# Patient Record
Sex: FEMALE | Race: WHITE | NOT HISPANIC OR LATINO | Employment: OTHER | URBAN - METROPOLITAN AREA
[De-identification: names, ages, dates, MRNs, and addresses within clinical notes are randomized per-mention and may not be internally consistent; named-entity substitution may affect disease eponyms.]

---

## 2017-01-27 ENCOUNTER — COMMUNICATION - HEALTHEAST (OUTPATIENT)
Dept: INTERNAL MEDICINE | Facility: CLINIC | Age: 82
End: 2017-01-27

## 2017-03-14 ENCOUNTER — OFFICE VISIT - HEALTHEAST (OUTPATIENT)
Dept: INTERNAL MEDICINE | Facility: CLINIC | Age: 82
End: 2017-03-14

## 2017-03-14 DIAGNOSIS — R05.9 COUGH: ICD-10-CM

## 2017-03-14 DIAGNOSIS — I10 ESSENTIAL HYPERTENSION WITH GOAL BLOOD PRESSURE LESS THAN 140/90: ICD-10-CM

## 2017-03-14 DIAGNOSIS — K21.9 ESOPHAGEAL REFLUX: ICD-10-CM

## 2017-03-14 DIAGNOSIS — M54.2 CERVICALGIA: ICD-10-CM

## 2017-03-14 DIAGNOSIS — M19.90 OSTEOARTHRITIS: ICD-10-CM

## 2017-03-14 DIAGNOSIS — R04.0 EPISTAXIS, RECURRENT: ICD-10-CM

## 2017-03-14 DIAGNOSIS — M54.59 MECHANICAL LOW BACK PAIN: ICD-10-CM

## 2017-03-14 ASSESSMENT — MIFFLIN-ST. JEOR: SCORE: 887.16

## 2017-03-17 ENCOUNTER — COMMUNICATION - HEALTHEAST (OUTPATIENT)
Dept: INTERNAL MEDICINE | Facility: CLINIC | Age: 82
End: 2017-03-17

## 2017-04-11 ENCOUNTER — OFFICE VISIT - HEALTHEAST (OUTPATIENT)
Dept: INTERNAL MEDICINE | Facility: CLINIC | Age: 82
End: 2017-04-11

## 2017-04-11 DIAGNOSIS — R05.9 COUGH: ICD-10-CM

## 2017-04-11 DIAGNOSIS — K21.9 GASTROESOPHAGEAL REFLUX DISEASE WITHOUT ESOPHAGITIS: ICD-10-CM

## 2017-04-11 DIAGNOSIS — E87.6 HYPOKALEMIA: ICD-10-CM

## 2017-04-11 ASSESSMENT — MIFFLIN-ST. JEOR: SCORE: 896.23

## 2017-04-12 ENCOUNTER — COMMUNICATION - HEALTHEAST (OUTPATIENT)
Dept: INTERNAL MEDICINE | Facility: CLINIC | Age: 82
End: 2017-04-12

## 2017-04-28 ENCOUNTER — COMMUNICATION - HEALTHEAST (OUTPATIENT)
Dept: INTERNAL MEDICINE | Facility: CLINIC | Age: 82
End: 2017-04-28

## 2017-07-18 ENCOUNTER — COMMUNICATION - HEALTHEAST (OUTPATIENT)
Dept: INTERNAL MEDICINE | Facility: CLINIC | Age: 82
End: 2017-07-18

## 2017-07-28 ENCOUNTER — COMMUNICATION - HEALTHEAST (OUTPATIENT)
Dept: INTERNAL MEDICINE | Facility: CLINIC | Age: 82
End: 2017-07-28

## 2017-10-12 ENCOUNTER — OFFICE VISIT - HEALTHEAST (OUTPATIENT)
Dept: INTERNAL MEDICINE | Facility: CLINIC | Age: 82
End: 2017-10-12

## 2017-10-12 DIAGNOSIS — R05.9 COUGH: ICD-10-CM

## 2017-10-12 DIAGNOSIS — K21.9 GASTROESOPHAGEAL REFLUX DISEASE WITHOUT ESOPHAGITIS: ICD-10-CM

## 2017-10-12 DIAGNOSIS — D63.1 ANEMIA IN STAGE 1 CHRONIC KIDNEY DISEASE: ICD-10-CM

## 2017-10-12 DIAGNOSIS — E87.6 HYPOKALEMIA: ICD-10-CM

## 2017-10-12 DIAGNOSIS — S32.000A LUMBAR COMPRESSION FRACTURE (H): ICD-10-CM

## 2017-10-12 DIAGNOSIS — M19.90 OSTEOARTHRITIS: ICD-10-CM

## 2017-10-12 DIAGNOSIS — M81.0 OSTEOPOROSIS: ICD-10-CM

## 2017-10-12 DIAGNOSIS — R42 DIZZINESS AND GIDDINESS: ICD-10-CM

## 2017-10-12 DIAGNOSIS — M54.59 MECHANICAL LOW BACK PAIN: ICD-10-CM

## 2017-10-12 DIAGNOSIS — N18.1 ANEMIA IN STAGE 1 CHRONIC KIDNEY DISEASE: ICD-10-CM

## 2017-10-12 ASSESSMENT — MIFFLIN-ST. JEOR: SCORE: 887.16

## 2017-10-23 ENCOUNTER — COMMUNICATION - HEALTHEAST (OUTPATIENT)
Dept: INTERNAL MEDICINE | Facility: CLINIC | Age: 82
End: 2017-10-23

## 2017-10-23 DIAGNOSIS — E87.6 HYPOKALEMIA: ICD-10-CM

## 2017-11-10 ENCOUNTER — COMMUNICATION - HEALTHEAST (OUTPATIENT)
Dept: INTERNAL MEDICINE | Facility: CLINIC | Age: 82
End: 2017-11-10

## 2017-11-10 DIAGNOSIS — E87.6 HYPOKALEMIA: ICD-10-CM

## 2017-11-10 DIAGNOSIS — M81.0 OSTEOPOROSIS: ICD-10-CM

## 2018-01-05 ENCOUNTER — COMMUNICATION - HEALTHEAST (OUTPATIENT)
Dept: INTERNAL MEDICINE | Facility: CLINIC | Age: 83
End: 2018-01-05

## 2018-01-05 DIAGNOSIS — K21.9 ESOPHAGEAL REFLUX: ICD-10-CM

## 2018-02-28 ENCOUNTER — COMMUNICATION - HEALTHEAST (OUTPATIENT)
Dept: INTERNAL MEDICINE | Facility: CLINIC | Age: 83
End: 2018-02-28

## 2018-02-28 DIAGNOSIS — M81.0 OSTEOPOROSIS: ICD-10-CM

## 2018-03-01 ENCOUNTER — COMMUNICATION - HEALTHEAST (OUTPATIENT)
Dept: INTERNAL MEDICINE | Facility: CLINIC | Age: 83
End: 2018-03-01

## 2018-03-01 DIAGNOSIS — S32.000A LUMBAR COMPRESSION FRACTURE (H): ICD-10-CM

## 2018-03-01 DIAGNOSIS — M81.0 OSTEOPOROSIS: ICD-10-CM

## 2018-03-10 ENCOUNTER — COMMUNICATION - HEALTHEAST (OUTPATIENT)
Dept: SCHEDULING | Facility: CLINIC | Age: 83
End: 2018-03-10

## 2018-03-13 ENCOUNTER — OFFICE VISIT - HEALTHEAST (OUTPATIENT)
Dept: INTERNAL MEDICINE | Facility: CLINIC | Age: 83
End: 2018-03-13

## 2018-03-13 ENCOUNTER — RECORDS - HEALTHEAST (OUTPATIENT)
Dept: GENERAL RADIOLOGY | Facility: CLINIC | Age: 83
End: 2018-03-13

## 2018-03-13 DIAGNOSIS — S32.000A WEDGE COMPRESSION FRACTURE OF UNSPECIFIED LUMBAR VERTEBRA, INITIAL ENCOUNTER FOR CLOSED FRACTURE (H): ICD-10-CM

## 2018-03-13 DIAGNOSIS — S32.000A LUMBAR COMPRESSION FRACTURE (H): ICD-10-CM

## 2018-03-13 ASSESSMENT — MIFFLIN-ST. JEOR: SCORE: 882.63

## 2018-03-16 ENCOUNTER — OFFICE VISIT - HEALTHEAST (OUTPATIENT)
Dept: INTERNAL MEDICINE | Facility: CLINIC | Age: 83
End: 2018-03-16

## 2018-03-16 DIAGNOSIS — M81.0 OSTEOPOROSIS: ICD-10-CM

## 2018-03-16 DIAGNOSIS — S32.010A CLOSED COMPRESSION FRACTURE OF FIRST LUMBAR VERTEBRA, INITIAL ENCOUNTER: ICD-10-CM

## 2018-03-18 ENCOUNTER — COMMUNICATION - HEALTHEAST (OUTPATIENT)
Dept: SCHEDULING | Facility: CLINIC | Age: 83
End: 2018-03-18

## 2018-03-19 ENCOUNTER — HOSPITAL ENCOUNTER (OUTPATIENT)
Dept: MRI IMAGING | Facility: HOSPITAL | Age: 83
Discharge: HOME | End: 2018-03-19
Attending: INTERNAL MEDICINE

## 2018-03-19 ENCOUNTER — COMMUNICATION - HEALTHEAST (OUTPATIENT)
Dept: INTERNAL MEDICINE | Facility: CLINIC | Age: 83
End: 2018-03-19

## 2018-03-19 DIAGNOSIS — S32.000A LUMBAR COMPRESSION FRACTURE (H): ICD-10-CM

## 2018-03-19 DIAGNOSIS — M48.56XA COLLAPSED VERTEBRA, NOT ELSEWHERE CLASSIFIED, LUMBAR REGION, INITIAL ENCOUNTER FOR FRACTURE (H): ICD-10-CM

## 2018-03-20 ENCOUNTER — HOSPITAL ENCOUNTER (OUTPATIENT)
Dept: INTERVENTIONAL RADIOLOGY/VASCULAR | Facility: CLINIC | Age: 83
Discharge: HOME | End: 2018-03-20
Attending: INTERNAL MEDICINE | Admitting: RADIOLOGY

## 2018-03-20 DIAGNOSIS — M48.56XA COLLAPSED VERTEBRA, NOT ELSEWHERE CLASSIFIED, LUMBAR REGION, INITIAL ENCOUNTER FOR FRACTURE (H): ICD-10-CM

## 2018-03-20 DIAGNOSIS — S32.000A LUMBAR COMPRESSION FRACTURE (H): ICD-10-CM

## 2018-03-20 LAB
CREAT SERPL-MCNC: 0.73 MG/DL (ref 0.6–1.1)
GFR SERPL CREATININE-BSD FRML MDRD: >60 ML/MIN/1.73M2
HGB BLD-MCNC: 13 G/DL (ref 12–16)
INR PPP: 0.99 (ref 0.9–1.1)
PLATELET # BLD AUTO: 222 THOU/UL (ref 140–440)

## 2018-03-20 ASSESSMENT — MIFFLIN-ST. JEOR: SCORE: 879.9

## 2018-04-19 ENCOUNTER — OFFICE VISIT - HEALTHEAST (OUTPATIENT)
Dept: INTERNAL MEDICINE | Facility: CLINIC | Age: 83
End: 2018-04-19

## 2018-04-19 DIAGNOSIS — K21.9 GASTROESOPHAGEAL REFLUX DISEASE WITHOUT ESOPHAGITIS: ICD-10-CM

## 2018-04-19 DIAGNOSIS — D63.1 ANEMIA IN STAGE 1 CHRONIC KIDNEY DISEASE: ICD-10-CM

## 2018-04-19 DIAGNOSIS — S32.010A CLOSED COMPRESSION FRACTURE OF FIRST LUMBAR VERTEBRA, INITIAL ENCOUNTER: ICD-10-CM

## 2018-04-19 DIAGNOSIS — N18.1 ANEMIA IN STAGE 1 CHRONIC KIDNEY DISEASE: ICD-10-CM

## 2018-04-19 ASSESSMENT — MIFFLIN-ST. JEOR: SCORE: 882.63

## 2018-06-29 ENCOUNTER — COMMUNICATION - HEALTHEAST (OUTPATIENT)
Dept: INTERNAL MEDICINE | Facility: CLINIC | Age: 83
End: 2018-06-29

## 2018-08-05 ENCOUNTER — COMMUNICATION - HEALTHEAST (OUTPATIENT)
Dept: INTERNAL MEDICINE | Facility: CLINIC | Age: 83
End: 2018-08-05

## 2018-08-05 DIAGNOSIS — M81.0 OSTEOPOROSIS: ICD-10-CM

## 2018-08-23 ENCOUNTER — RADIANT APPOINTMENT (OUTPATIENT)
Dept: GENERAL RADIOLOGY | Facility: CLINIC | Age: 83
End: 2018-08-23
Attending: PHYSICIAN ASSISTANT
Payer: COMMERCIAL

## 2018-08-23 ENCOUNTER — OFFICE VISIT (OUTPATIENT)
Dept: URGENT CARE | Facility: URGENT CARE | Age: 83
End: 2018-08-23
Payer: COMMERCIAL

## 2018-08-23 VITALS
SYSTOLIC BLOOD PRESSURE: 160 MMHG | HEART RATE: 97 BPM | OXYGEN SATURATION: 95 % | DIASTOLIC BLOOD PRESSURE: 70 MMHG | WEIGHT: 118 LBS

## 2018-08-23 DIAGNOSIS — M89.8X2 PAIN OF RIGHT HUMERUS: ICD-10-CM

## 2018-08-23 DIAGNOSIS — S46.211A RUPTURE OF RIGHT BICEPS TENDON, INITIAL ENCOUNTER: Primary | ICD-10-CM

## 2018-08-23 DIAGNOSIS — S80.12XA LEG HEMATOMA, LEFT, INITIAL ENCOUNTER: ICD-10-CM

## 2018-08-23 PROCEDURE — 99203 OFFICE O/P NEW LOW 30 MIN: CPT | Performed by: PHYSICIAN ASSISTANT

## 2018-08-23 PROCEDURE — 73060 X-RAY EXAM OF HUMERUS: CPT | Mod: RT

## 2018-08-23 RX ORDER — FERROUS GLUCONATE 324(38)MG
324 TABLET ORAL
COMMUNITY
End: 2022-02-16

## 2018-08-23 RX ORDER — OMEGA-3-ACID ETHYL ESTERS 900 MG/1
CAPSULE, LIQUID FILLED ORAL
COMMUNITY
End: 2022-02-16

## 2018-08-23 RX ORDER — ACETAMINOPHEN 325 MG/1
650 TABLET ORAL
COMMUNITY
End: 2022-02-28

## 2018-08-23 RX ORDER — POTASSIUM CHLORIDE 750 MG/1
TABLET, EXTENDED RELEASE ORAL
COMMUNITY
Start: 2017-10-23 | End: 2020-02-28

## 2018-08-23 RX ORDER — ALENDRONATE SODIUM 70 MG/1
TABLET ORAL
COMMUNITY
Start: 2018-08-05 | End: 2020-06-18

## 2018-08-23 RX ORDER — OXYBUTYNIN CHLORIDE 10 MG/1
TABLET, EXTENDED RELEASE ORAL
COMMUNITY
Start: 2017-07-18 | End: 2021-06-28

## 2018-08-23 RX ORDER — DOCUSATE SODIUM 100 MG/1
100 CAPSULE, LIQUID FILLED ORAL
COMMUNITY
End: 2022-02-28

## 2018-08-23 NOTE — PATIENT INSTRUCTIONS
Hematoma  A hematoma is a collection of blood trapped outside of a blood vessel. It is what we think of as a bruise or a contusion. It is usually seen under the skin as a black and blue spot on your arm or leg, or a bump on your head after an injury. It can be almost anywhere on or in your body. It can also occur in an internal organ where it can be more serious.  A hematoma is caused by an injury with damage to small blood vessels. This causes blood to leak into the tissues. Blood forms a pocket under the skin that swells and looks like a purplish patch.  Gradually the blood in the hematoma is absorbed back into the body. The swelling and pain of the hematoma will go away. This takes from 1 to 4 weeks, depending on the size of the hematoma. The skin over the hematoma may turn bluish then brown and yellow as the blood is dissolved and absorbed. Usually, this only takes a couple of weeks but can last months.  Home care    Limit motion of the joints near the hematoma. If the hematoma is large and painful, avoid sports and other vigorous physical activity until the swelling and pain goes away.    Apply an ice pack (ice cubes in a plastic bag, wrapped in a thin towel or a frozen bag of peas) over the injured area for 20 minutes every 1 to 2 hours the first day. Continue with ice packs 3 to 4 times a day for the next 2 days. Continue the use of ice packs for relief of pain and swelling as needed.    If you need anything for pain, you can take acetaminophen, unless you were given a different pain medicine to use. Talk with your doctor before using this medicine if you have chronic liver or kidney disease. Also talk with your doctor if you have had a stomach ulcer or digestive tract bleeding, or are taking blood-thinner medicines.  Follow-up care  Follow up with your healthcare provider, or as advised. If X-rays or a CT scan were done, you will be notified if there is a change in the reading, especially if it affects  treatment.  When to seek medical advice  Call your healthcare provider right away f any of the following occur:    Redness around the hematoma    Increase in pain or warmth in the hematoma    Increase in size of the hematoma    Fever of 100.4 F (38 C) or higher, or as directed by your healthcare provider    If the hematoma is on the arm or leg, watch for:  ? Increased swelling or pain in the extremity  ? Numbness or tingling or blue color of the hand or foot  Date Last Reviewed: 11/5/2015 2000-2017 [employer]. [address] [place] [address]. All rights reserved. This information is not intended as a substitute for professional medical care. Always follow your healthcare professional's instructions.    8/23/18 Urgent Care Plan:     As we discussed today, the swelling and bruising you developed immediately after your fall may be a hematoma without other associated injury. However, it also may be a rupture of your bicep tendon.     Please follow-up with your primary care doctor or an orthopedic doctor (I provided a referral option for you).

## 2018-08-23 NOTE — MR AVS SNAPSHOT
After Visit Summary   8/23/2018    Lynn Soriano    MRN: [id number]           Patient Information     Date Of Birth          3/3/1929        Visit Information        Provider Department      8/23/2018 3:10 PM Zackery Edwards PA-C [facility]        Today's Diagnoses     Pain of right humerus    -  1      Care Instructions      Hematoma  A hematoma is a collection of blood trapped outside of a blood vessel. It is what we think of as a bruise or a contusion. It is usually seen under the skin as a black and blue spot on your arm or leg, or a bump on your head after an injury. It can be almost anywhere on or in your body. It can also occur in an internal organ where it can be more serious.  A hematoma is caused by an injury with damage to small blood vessels. This causes blood to leak into the tissues. Blood forms a pocket under the skin that swells and looks like a purplish patch.  Gradually the blood in the hematoma is absorbed back into the body. The swelling and pain of the hematoma will go away. This takes from 1 to 4 weeks, depending on the size of the hematoma. The skin over the hematoma may turn bluish then brown and yellow as the blood is dissolved and absorbed. Usually, this only takes a couple of weeks but can last months.  Home care    Limit motion of the joints near the hematoma. If the hematoma is large and painful, avoid sports and other vigorous physical activity until the swelling and pain goes away.    Apply an ice pack (ice cubes in a plastic bag, wrapped in a thin towel or a frozen bag of peas) over the injured area for 20 minutes every 1 to 2 hours the first day. Continue with ice packs 3 to 4 times a day for the next 2 days. Continue the use of ice packs for relief of pain and swelling as needed.    If you need anything for pain, you can take acetaminophen, unless you were given a different pain medicine to use. Talk with your doctor before using this  medicine if you have chronic liver or kidney disease. Also talk with your doctor if you have had a stomach ulcer or digestive tract bleeding, or are taking blood-thinner medicines.  Follow-up care  Follow up with your healthcare provider, or as advised. If X-rays or a CT scan were done, you will be notified if there is a change in the reading, especially if it affects treatment.  When to seek medical advice  Call your healthcare provider right away f any of the following occur:    Redness around the hematoma    Increase in pain or warmth in the hematoma    Increase in size of the hematoma    Fever of 100.4 F (38 C) or higher, or as directed by your healthcare provider    If the hematoma is on the arm or leg, watch for:  ? Increased swelling or pain in the extremity  ? Numbness or tingling or blue color of the hand or foot  Date Last Reviewed: 11/5/2015 2000-2017 [employer]. [address] [place] [address]. All rights reserved. This information is not intended as a substitute for professional medical care. Always follow your healthcare professional's instructions.    8/23/18 Urgent Care Plan:     As we discussed today, the swelling and bruising you developed immediately after your fall may be a hematoma without other associated injury. However, it also may be a rupture of your bicep tendon.     Please follow-up with your primary care doctor or an orthopedic doctor (I provided a referral option for you).             Follow-ups after your visit        Additional Services     ORTHO  REFERRAL       [facility] is referring you to the Orthopedic  Services at [facility].       The  Representative will assist you in the coordination of your Orthopedic and Musculoskeletal Care as prescribed by your physician.    The  Representative will call you within 1 business day to help schedule your appointment, or you may contact the  "[facility] Representative at:    All areas ~ [phone]     Type of Referral : Surgical / Specialist       Timeframe requested: 3 - 5 days    Coverage of these services is subject to the terms and limitations of your health insurance plan.  Please call member services at your health plan with any benefit or coverage questions.      If X-rays, CT or MRI's have been performed, please contact the facility where they were done to arrange for , prior to your scheduled appointment.  Please bring this referral request to your appointment and present it to your specialist.                  Who to contact     If you have questions or need follow up information about today's clinic visit or your schedule please contact [facility] directly at [phone].  Normal or non-critical lab and imaging results will be communicated to you by [employer], letter or phone within 4 business days after the clinic has received the results. If you do not hear from us within 7 days, please contact the clinic through [employer] or phone. If you have a critical or abnormal lab result, we will notify you by phone as soon as possible.  Submit refill requests through [employer] or call your pharmacy and they will forward the refill request to us. Please allow 3 business days for your refill to be completed.          Additional Information About Your Visit        [employer] Information     [employer] lets you send messages to your doctor, view your test results, renew your prescriptions, schedule appointments and more. To sign up, go to [email or web address] . Click on \"Log in\" on the left side of the screen, which will take you to the Welcome page. Then click on \"Sign up Now\" on the right side of the page.     You will be asked to enter the access code listed below, as well as some personal information. Please follow the directions to create your username and password.     Your access code is: [email or web address]  Expires: 11/21/2018  4:32 " PM     Your access code will  in 90 days. If you need help or a new code, please call your [facility] clinic or [phone].        Care EveryWhere ID     This is your Care EveryWhere ID. This could be used by other organizations to access your [facility] medical records  [id number]        Your Vitals Were     Pulse Pulse Oximetry                97 95%           Blood Pressure from Last 3 Encounters:   18 160/70    Weight from Last 3 Encounters:   18 118 lb (53.5 kg)              We Performed the Following     ORTHO  REFERRAL        Primary Care Provider Office Phone # Fax #    Hakan Monson [phone]       [facility] [address] [address]        Equal Access to Services     ROCCO HALL : Hadii angela ku hadasho Soomaali, waaxda luqadaha, qaybta kaalmada adeegyada, waxay idiin haycarolynn sonja winkler . So [facility] [phone].    ATENCIÓN: Si habla español, tiene a cox disposición servicios gratuitos de asistencia lingüística. Llame al [phone].    We comply with applicable federal civil rights laws and [place] laws. We do not discriminate on the basis of race, color, national origin, age, disability, sex, sexual orientation, or gender identity.            Thank you!     Thank you for choosing [facility]  for your care. Our goal is always to provide you with excellent care. Hearing back from our patients is one way we can continue to improve our services. Please take a few minutes to complete the written survey that you may receive in the mail after your visit with us. Thank you!             Your Updated Medication List - Protect others around you: Learn how to safely use, store and throw away your medicines at www.disposemymeds.org.          This list is accurate as of 18  4:39 PM.  Always use your most recent med list.                   Brand Name Dispense Instructions for use Diagnosis    acetaminophen 325  MG tablet    TYLENOL     Take 650 mg by mouth        alendronate 70 MG tablet    FOSAMAX     TAKE 1 TABLET (70 MG TOTAL) BY MOUTH EVERY 7 DAYS.        CALCIUM 500 + D 500-125 MG-UNIT Tabs   Generic drug:  Calcium Carbonate-Vitamin D           docusate sodium 100 MG capsule    COLACE     Take 100 mg by mouth        ferrous gluconate 324 (38 Fe) MG tablet    FERGON     Take 324 mg by mouth        fish oil omega-3 fatty acid 320MG/ML oral suspension           KLOR-CON 10 MEQ tablet   Generic drug:  potassium chloride      TAKE 1 TABLET (10 MEQ TOTAL) BY MOUTH DAILY.        MULTIVITAMIN ADULT PO      Take 1 tablet by mouth        omeprazole 20 MG CR capsule    priLOSEC     TAKE 1 CAPSULE (20 MG TOTAL) BY MOUTH DAILY.        oxybutynin 10 MG 24 hr tablet    DITROPAN-XL     TAKE 1 TABLET (10 MG TOTAL) BY MOUTH DAILY.        Sodium Chloride 0.65 % Soln      Spray 1 spray in nostril

## 2018-08-23 NOTE — PROGRESS NOTES
SUBJECTIVE:  Lynn Soriano  is a 89 year oldyear old left hand dominant woman who presents to  today, accompanied by her daughter (Kelley) for evaluation of right upper arm pain and left leg pain (medial to knee) after fall she sustained at home just prior to her  visit today.     Patient was sitting on couch at home when doorbell rang. She got up quickly and feels she may have tripped on rug--falling forward onto carpeted floor in process. Patient ambulates with walker and/or cane assist.       Denies any LOC. Denies any head or neck sxs. Denies any pain elsewhere.     Mechanism of injury: positive for immediate right arm pain and bruising.  No deformity was noted by the patient. She also noted left leg bruising but has minimal pain only left leg.   Symptoms have been unchanged since that time.   Prior history of related problems: no prior problems with this area in the past.  Symptoms exacerbated by: Admits to increased tenderness with ROM RUE  Symptoms relieved by: No pain at rest in neutral position     Specifically denies any cold, blue, icy feeling fingers or toes.  Denies any numbness or tingling of affected upper extremity or affected lower extremity.     PMHX: Osteoporosis and overactive bladder     Current Outpatient Prescriptions   Medication     alendronate (FOSAMAX) 70 MG tablet     Calcium Carbonate-Vitamin D (CALCIUM 500 + D) 500-125 MG-UNIT TABS     docusate sodium (COLACE) 100 MG capsule     ferrous gluconate (FERGON) 324 (38 Fe) MG tablet     Multiple Vitamins-Minerals (MULTIVITAMIN ADULT PO)     Omega-3-acid Ethyl Esters (FISH OIL OMEGA-3 FATTY ACID) 320MG/ML oral suspension     omeprazole (PRILOSEC) 20 MG CR capsule     oxybutynin (DITROPAN-XL) 10 MG 24 hr tablet     potassium chloride (KLOR-CON) 10 MEQ tablet     Saline (SODIUM CHLORIDE) 0.65 % SOLN     acetaminophen (TYLENOL) 325 MG tablet     No current facility-administered medications for this visit.      Allergies   Allergen  "Reactions     Aspirin      Other reaction(s): *Unknown     Penicillins        OBJECTIVE:  /70 (BP Location: Right arm, Patient Position: Chair, Cuff Size: Adult Regular)  Pulse 97  Wt 118 lb (53.5 kg)  SpO2 95%     GENERAL:  Very pleasant, comfortable and generally well appearing.     RIGHT UPPER EXTREMITY: Positive for bruising and swelling right bicep/mid-humerus. This does look like a \"wad\"/bicep rupture. Pain is focal in this area. Non-tender on palpation of humeral head, distal humerus, clavicle, elbow, forearm, wrist, hand or fingers.       LLE: Valgus deformity noted. Positive hematoma medial to patella. Minimal tenderness. Non-tender on palpation of actual patella, tibia, fibula or femur. Patient has FROM knee. No limp with ambulation.     SKIN: Positive right humerus bruising and LLE bruising as per above. No lacerations or abrasions    NEURO: Sensation intact to light touch along entire UE and into all fingertips today. NEURO: Alert and oriented.  Normal speech and mentation.  CN II/XII grossly intact.  Gait within normal limits.        VASCULAR: No compromise to distal circulation. Brachial and radial pulses are 2+ and equal to that of unaffected extremity. Good/brisk capillary refill to all digits confirmed today.      I reviewed my impression (No acute fracture. No acute findings), along with actual x-ray images, with patient and daughter during her office visit today.  Radiologist over-read pending. Patient will need to be called if there are any acute findings on radiologist over-read.         ASSESSMENT/PLAN:    8/23/18 Urgent Care Plan:     As we discussed today, the swelling and bruising you developed immediately after your fall may be a hematoma without other associated injury. However, it also may be a rupture of your bicep tendon.     Please follow-up with your primary care doctor or an orthopedic doctor (I provided a referral option for you).       (S46.211A) Rupture of right biceps " tendon, initial encounter  (primary encounter diagnosis)  Plan: ORTHO  REFERRAL    RICE. Tylenol prn. I intentionally did not place in sling due to concern for increased fall risk. I did wrap with ACE to provide some compression to area with instructions to remove frequently.     (M79.621) Pain of right humerus  Plan: XR Humerus Right G/E 2 Views, ORTHO          REFERRAL  As per above     (S80.12XA) Leg hematoma, left, initial encounter  Plan: ICE. Follow-up with PCP if sxs change, worsen or fail to fully resolve with above tx.

## 2018-08-30 ENCOUNTER — OFFICE VISIT - HEALTHEAST (OUTPATIENT)
Dept: INTERNAL MEDICINE | Facility: CLINIC | Age: 83
End: 2018-08-30

## 2018-08-30 DIAGNOSIS — I10 ESSENTIAL HYPERTENSION: ICD-10-CM

## 2018-08-30 DIAGNOSIS — M75.101 ROTATOR CUFF SYNDROME, RIGHT: ICD-10-CM

## 2018-08-30 ASSESSMENT — MIFFLIN-ST. JEOR: SCORE: 864.48

## 2018-09-13 ENCOUNTER — RECORDS - HEALTHEAST (OUTPATIENT)
Dept: ADMINISTRATIVE | Facility: OTHER | Age: 83
End: 2018-09-13

## 2018-10-07 ENCOUNTER — COMMUNICATION - HEALTHEAST (OUTPATIENT)
Dept: INTERNAL MEDICINE | Facility: CLINIC | Age: 83
End: 2018-10-07

## 2018-10-23 ENCOUNTER — COMMUNICATION - HEALTHEAST (OUTPATIENT)
Dept: INTERNAL MEDICINE | Facility: CLINIC | Age: 83
End: 2018-10-23

## 2018-10-23 ENCOUNTER — OFFICE VISIT - HEALTHEAST (OUTPATIENT)
Dept: INTERNAL MEDICINE | Facility: CLINIC | Age: 83
End: 2018-10-23

## 2018-10-23 DIAGNOSIS — E87.6 HYPOKALEMIA: ICD-10-CM

## 2018-10-23 DIAGNOSIS — I73.00 RAYNAUD'S DISEASE WITHOUT GANGRENE: ICD-10-CM

## 2018-10-23 DIAGNOSIS — D63.1 ANEMIA IN STAGE 1 CHRONIC KIDNEY DISEASE: ICD-10-CM

## 2018-10-23 DIAGNOSIS — I10 ESSENTIAL HYPERTENSION: ICD-10-CM

## 2018-10-23 DIAGNOSIS — M75.101 ROTATOR CUFF SYNDROME, RIGHT: ICD-10-CM

## 2018-10-23 DIAGNOSIS — M81.0 OSTEOPOROSIS: ICD-10-CM

## 2018-10-23 DIAGNOSIS — N18.1 ANEMIA IN STAGE 1 CHRONIC KIDNEY DISEASE: ICD-10-CM

## 2018-10-23 DIAGNOSIS — M54.59 MECHANICAL LOW BACK PAIN: ICD-10-CM

## 2018-10-23 DIAGNOSIS — K21.9 GASTROESOPHAGEAL REFLUX DISEASE WITHOUT ESOPHAGITIS: ICD-10-CM

## 2018-10-23 DIAGNOSIS — S32.010A CLOSED COMPRESSION FRACTURE OF FIRST LUMBAR VERTEBRA, INITIAL ENCOUNTER: ICD-10-CM

## 2018-10-23 LAB
ALBUMIN SERPL-MCNC: 3.8 G/DL (ref 3.5–5)
ALP SERPL-CCNC: 47 U/L (ref 45–120)
ALT SERPL W P-5'-P-CCNC: 27 U/L (ref 0–45)
ANION GAP SERPL CALCULATED.3IONS-SCNC: 11 MMOL/L (ref 5–18)
AST SERPL W P-5'-P-CCNC: 28 U/L (ref 0–40)
BILIRUB SERPL-MCNC: 0.5 MG/DL (ref 0–1)
BUN SERPL-MCNC: 22 MG/DL (ref 8–28)
CALCIUM SERPL-MCNC: 9.5 MG/DL (ref 8.5–10.5)
CHLORIDE BLD-SCNC: 106 MMOL/L (ref 98–107)
CHOLEST SERPL-MCNC: 210 MG/DL
CO2 SERPL-SCNC: 26 MMOL/L (ref 22–31)
CREAT SERPL-MCNC: 0.76 MG/DL (ref 0.6–1.1)
ERYTHROCYTE [DISTWIDTH] IN BLOOD BY AUTOMATED COUNT: 11.9 % (ref 11–14.5)
FASTING STATUS PATIENT QL REPORTED: YES
GFR SERPL CREATININE-BSD FRML MDRD: >60 ML/MIN/1.73M2
GLUCOSE BLD-MCNC: 90 MG/DL (ref 70–125)
HCT VFR BLD AUTO: 40.8 % (ref 35–47)
HDLC SERPL-MCNC: 80 MG/DL
HGB BLD-MCNC: 13.4 G/DL (ref 12–16)
LDLC SERPL CALC-MCNC: 113 MG/DL
MCH RBC QN AUTO: 32.5 PG (ref 27–34)
MCHC RBC AUTO-ENTMCNC: 32.8 G/DL (ref 32–36)
MCV RBC AUTO: 99 FL (ref 80–100)
PLATELET # BLD AUTO: 211 THOU/UL (ref 140–440)
PMV BLD AUTO: 7.8 FL (ref 7–10)
POTASSIUM BLD-SCNC: 3.9 MMOL/L (ref 3.5–5)
PROT SERPL-MCNC: 6.8 G/DL (ref 6–8)
RBC # BLD AUTO: 4.11 MILL/UL (ref 3.8–5.4)
SODIUM SERPL-SCNC: 143 MMOL/L (ref 136–145)
TRIGL SERPL-MCNC: 85 MG/DL
WBC: 5.7 THOU/UL (ref 4–11)

## 2018-10-23 ASSESSMENT — MIFFLIN-ST. JEOR: SCORE: 869.02

## 2019-04-16 ENCOUNTER — RECORDS - HEALTHEAST (OUTPATIENT)
Dept: ADMINISTRATIVE | Facility: OTHER | Age: 84
End: 2019-04-16

## 2019-04-19 ENCOUNTER — COMMUNICATION - HEALTHEAST (OUTPATIENT)
Dept: INTERNAL MEDICINE | Facility: CLINIC | Age: 84
End: 2019-04-19

## 2019-04-23 ENCOUNTER — OFFICE VISIT - HEALTHEAST (OUTPATIENT)
Dept: INTERNAL MEDICINE | Facility: CLINIC | Age: 84
End: 2019-04-23

## 2019-04-23 DIAGNOSIS — M81.0 OSTEOPOROSIS: ICD-10-CM

## 2019-04-23 DIAGNOSIS — N18.1 ANEMIA IN STAGE 1 CHRONIC KIDNEY DISEASE: ICD-10-CM

## 2019-04-23 DIAGNOSIS — I10 ESSENTIAL HYPERTENSION: ICD-10-CM

## 2019-04-23 DIAGNOSIS — E87.6 HYPOKALEMIA: ICD-10-CM

## 2019-04-23 DIAGNOSIS — M54.59 MECHANICAL LOW BACK PAIN: ICD-10-CM

## 2019-04-23 DIAGNOSIS — I73.00 RAYNAUD'S DISEASE WITHOUT GANGRENE: ICD-10-CM

## 2019-04-23 DIAGNOSIS — K21.9 GASTROESOPHAGEAL REFLUX DISEASE WITHOUT ESOPHAGITIS: ICD-10-CM

## 2019-04-23 DIAGNOSIS — D63.1 ANEMIA IN STAGE 1 CHRONIC KIDNEY DISEASE: ICD-10-CM

## 2019-04-23 DIAGNOSIS — S32.010A CLOSED COMPRESSION FRACTURE OF FIRST LUMBAR VERTEBRA, INITIAL ENCOUNTER: ICD-10-CM

## 2019-09-06 ENCOUNTER — COMMUNICATION - HEALTHEAST (OUTPATIENT)
Dept: INTERNAL MEDICINE | Facility: CLINIC | Age: 84
End: 2019-09-06

## 2019-10-24 ENCOUNTER — OFFICE VISIT - HEALTHEAST (OUTPATIENT)
Dept: INTERNAL MEDICINE | Facility: CLINIC | Age: 84
End: 2019-10-24

## 2019-10-24 ENCOUNTER — COMMUNICATION - HEALTHEAST (OUTPATIENT)
Dept: INTERNAL MEDICINE | Facility: CLINIC | Age: 84
End: 2019-10-24

## 2019-10-24 DIAGNOSIS — Z00.00 ROUTINE GENERAL MEDICAL EXAMINATION AT A HEALTH CARE FACILITY: ICD-10-CM

## 2019-10-24 DIAGNOSIS — E87.6 HYPOKALEMIA: ICD-10-CM

## 2019-10-24 DIAGNOSIS — M80.00XA AGE-RELATED OSTEOPOROSIS WITH CURRENT PATHOLOGICAL FRACTURE, INITIAL ENCOUNTER: ICD-10-CM

## 2019-10-24 DIAGNOSIS — K21.9 GASTROESOPHAGEAL REFLUX DISEASE WITHOUT ESOPHAGITIS: ICD-10-CM

## 2019-10-24 DIAGNOSIS — M48.56XA COLLAPSED VERTEBRA, NOT ELSEWHERE CLASSIFIED, LUMBAR REGION, INITIAL ENCOUNTER FOR FRACTURE (H): ICD-10-CM

## 2019-10-24 DIAGNOSIS — D63.1 ANEMIA IN STAGE 1 CHRONIC KIDNEY DISEASE: ICD-10-CM

## 2019-10-24 DIAGNOSIS — N18.1 ANEMIA IN STAGE 1 CHRONIC KIDNEY DISEASE: ICD-10-CM

## 2019-10-24 DIAGNOSIS — M75.101 ROTATOR CUFF SYNDROME, RIGHT: ICD-10-CM

## 2019-10-24 DIAGNOSIS — S32.010A CLOSED COMPRESSION FRACTURE OF FIRST LUMBAR VERTEBRA, INITIAL ENCOUNTER: ICD-10-CM

## 2019-10-24 DIAGNOSIS — I10 ESSENTIAL HYPERTENSION: ICD-10-CM

## 2019-10-24 DIAGNOSIS — Z23 FLU VACCINE NEED: ICD-10-CM

## 2019-10-24 DIAGNOSIS — M54.59 MECHANICAL LOW BACK PAIN: ICD-10-CM

## 2019-10-24 LAB
ALBUMIN SERPL-MCNC: 4 G/DL (ref 3.5–5)
ALP SERPL-CCNC: 40 U/L (ref 45–120)
ALT SERPL W P-5'-P-CCNC: 19 U/L (ref 0–45)
ANION GAP SERPL CALCULATED.3IONS-SCNC: 9 MMOL/L (ref 5–18)
AST SERPL W P-5'-P-CCNC: 23 U/L (ref 0–40)
BILIRUB SERPL-MCNC: 0.6 MG/DL (ref 0–1)
BUN SERPL-MCNC: 13 MG/DL (ref 8–28)
CALCIUM SERPL-MCNC: 9.7 MG/DL (ref 8.5–10.5)
CHLORIDE BLD-SCNC: 105 MMOL/L (ref 98–107)
CO2 SERPL-SCNC: 26 MMOL/L (ref 22–31)
CREAT SERPL-MCNC: 0.77 MG/DL (ref 0.6–1.1)
ERYTHROCYTE [DISTWIDTH] IN BLOOD BY AUTOMATED COUNT: 11.9 % (ref 11–14.5)
GFR SERPL CREATININE-BSD FRML MDRD: >60 ML/MIN/1.73M2
GLUCOSE BLD-MCNC: 92 MG/DL (ref 70–125)
HCT VFR BLD AUTO: 42.5 % (ref 35–47)
HGB BLD-MCNC: 14.1 G/DL (ref 12–16)
MCH RBC QN AUTO: 32.2 PG (ref 27–34)
MCHC RBC AUTO-ENTMCNC: 33.1 G/DL (ref 32–36)
MCV RBC AUTO: 97 FL (ref 80–100)
PLATELET # BLD AUTO: 186 THOU/UL (ref 140–440)
PMV BLD AUTO: 7.6 FL (ref 7–10)
POTASSIUM BLD-SCNC: 4.4 MMOL/L (ref 3.5–5)
PROT SERPL-MCNC: 7.3 G/DL (ref 6–8)
RBC # BLD AUTO: 4.36 MILL/UL (ref 3.8–5.4)
SODIUM SERPL-SCNC: 140 MMOL/L (ref 136–145)
WBC: 5.9 THOU/UL (ref 4–11)

## 2019-10-24 ASSESSMENT — MIFFLIN-ST. JEOR: SCORE: 853.7

## 2019-11-18 ENCOUNTER — RECORDS - HEALTHEAST (OUTPATIENT)
Dept: ADMINISTRATIVE | Facility: OTHER | Age: 84
End: 2019-11-18

## 2019-12-06 ENCOUNTER — DOCUMENTATION ONLY (OUTPATIENT)
Dept: OTHER | Facility: CLINIC | Age: 84
End: 2019-12-06

## 2019-12-06 ENCOUNTER — AMBULATORY - HEALTHEAST (OUTPATIENT)
Dept: OTHER | Facility: CLINIC | Age: 84
End: 2019-12-06

## 2019-12-17 ENCOUNTER — RECORDS - HEALTHEAST (OUTPATIENT)
Dept: ADMINISTRATIVE | Facility: OTHER | Age: 84
End: 2019-12-17

## 2019-12-23 ENCOUNTER — PRE VISIT (OUTPATIENT)
Dept: PEDIATRICS | Facility: CLINIC | Age: 84
End: 2019-12-23

## 2019-12-23 NOTE — TELEPHONE ENCOUNTER
"Pre-Visit Planning     Future Appointments   Date Time Provider Department Center   12/24/2019  9:00 AM Priscilla Smith MD [facility]     Arrival Time for this Appointment:    Appointment Notes for this encounter:   Data Unavailable    Questionnaires Reviewed/Assigned  No additional questionnaires are needed       Patient preferred phone number: [phone]    Spoke to patient via phone. Patient does not have additional questions or concerns.        Visit is not preventive.    Health Maintenance Due   Topic Date Due     ANNUAL REVIEW OF HM ORDERS  03/03/1929     DTAP/TDAP/TD IMMUNIZATION (1 - Tdap) 03/03/1940     ZOSTER IMMUNIZATION (1 of 2) 03/03/1979     MEDICARE ANNUAL WELLNESS VISIT  03/03/1994     FALL RISK ASSESSMENT  03/03/1994     PNEUMOCOCCAL IMMUNIZATION 65+ LOW/MEDIUM RISK (1 of 2 - PCV13) 03/03/1994     PHQ-2  01/01/2019     Patient is due for:  preventive care visit  Patient declined appointment.    [employer]  Patient is not active on [employer]. Encouraged [employer] activation.    Questionnaire Review   Advised patient to arrive early in order to complete questionnaires.    Call Summary  \"Thank you for your time today.  If anything comes up before your appointment, please feel free to contact us at [phone].\"  "

## 2019-12-24 ENCOUNTER — OFFICE VISIT (OUTPATIENT)
Dept: PEDIATRICS | Facility: CLINIC | Age: 84
End: 2019-12-24
Payer: COMMERCIAL

## 2019-12-24 VITALS
HEART RATE: 88 BPM | TEMPERATURE: 97.9 F | HEIGHT: 58 IN | RESPIRATION RATE: 22 BRPM | DIASTOLIC BLOOD PRESSURE: 70 MMHG | SYSTOLIC BLOOD PRESSURE: 142 MMHG | BODY MASS INDEX: 26.87 KG/M2 | WEIGHT: 128 LBS

## 2019-12-24 DIAGNOSIS — R03.0 ELEVATED BP WITHOUT DIAGNOSIS OF HYPERTENSION: ICD-10-CM

## 2019-12-24 DIAGNOSIS — K21.9 GASTROESOPHAGEAL REFLUX DISEASE WITHOUT ESOPHAGITIS: ICD-10-CM

## 2019-12-24 DIAGNOSIS — I73.00 RAYNAUD'S DISEASE WITHOUT GANGRENE: ICD-10-CM

## 2019-12-24 DIAGNOSIS — M81.0 AGE-RELATED OSTEOPOROSIS WITHOUT CURRENT PATHOLOGICAL FRACTURE: ICD-10-CM

## 2019-12-24 DIAGNOSIS — N32.81 OVERACTIVE BLADDER: ICD-10-CM

## 2019-12-24 DIAGNOSIS — Z87.81 HISTORY OF COMPRESSION FRACTURE OF SPINE: ICD-10-CM

## 2019-12-24 PROCEDURE — 99204 OFFICE O/P NEW MOD 45 MIN: CPT | Performed by: INTERNAL MEDICINE

## 2019-12-24 ASSESSMENT — MIFFLIN-ST. JEOR: SCORE: 882.41

## 2019-12-24 NOTE — PROGRESS NOTES
Subjective     Lynn Soriano is a 89 year old female who presents to clinic today for the following health issues:    HPI   New Patient/Transfer of Care    No blood thinners.     Ms. Soriano is transferring her care from [facility] due to USP of her prior PCP. She has the following medical issues:    1. Elevated BP: Not on medication for this.   2. Raynaud's: in hands, has tried CCB, but this didn't help.  3. Overactive bladder: On oxybutynin, tried Myrbetriq (didn't help). Oxybutynin helps somewhat.   4. Balance issues: Uses a walker to get around. Has fallen twice in the past year, did need stitches this October for laceration on head.   5. Hx of compression fracture of vertebrae: In 2018, takes alendronate. Has a hx of osteoporosis, unsure how long she has been on this.  6. GERD: takes omeprazole daily.     Lives independently in senior living facility. Has not complete POLST before, but interested in doing so today. No longer driving.       Patient Active Problem List   Diagnosis     Gastroesophageal reflux disease without esophagitis     Age-related osteoporosis without current pathological fracture     History of compression fracture of spine     Elevated BP without diagnosis of hypertension     Overactive bladder     Raynaud's disease without gangrene     History reviewed. No pertinent surgical history.    Social History     Tobacco Use     Smoking status: Never Smoker     Smokeless tobacco: Never Used   Substance Use Topics     Alcohol use: Yes     Comment: rare     History reviewed. No pertinent family history.        Reviewed and updated as needed this visit by Provider  Tobacco  Med Hx  Surg Hx  Fam Hx  Soc Hx        Review of Systems   ROS COMP: Constitutional, HEENT, cardiovascular, pulmonary, GI, , musculoskeletal, neuro, skin, endocrine and psych systems are negative, except as otherwise noted.      Objective    BP (!) 142/70 (BP Location: Right arm, Patient Position: Sitting, Cuff Size:  "Adult Regular)   Pulse 88   Temp 97.9  F (36.6  C) (Tympanic)   Resp 22   Ht 1.461 m (4' 9.5\")   Wt 58.1 kg (128 lb)   BMI 27.22 kg/m    Body mass index is 27.22 kg/m .  Physical Exam   GENERAL: healthy, alert and no distress  EYES: Eyes grossly normal to inspection, PERRL and conjunctivae and sclerae normal  HENT: normal cephalic/atraumatic, nose and mouth without ulcers or lesions, oropharynx clear and oral mucous membranes moist  NECK: no adenopathy, no asymmetry, masses, or scars and thyroid normal to palpation  RESP: lungs clear to auscultation - no rales, rhonchi or wheezes  CV: regular rate and rhythm, normal S1 S2, no S3 or S4, no murmur, click or rub, no peripheral edema and peripheral pulses strong  MS: bilateral trace lower extremity edema  NEURO: Normal strength and tone, mentation intact and speech normal  PSYCH: mentation appears normal, affect normal/bright          Assessment & Plan     1. Gastroesophageal reflux disease without esophagitis  Well controlled with PPI.     2. Age-related osteoporosis without current pathological fracture  On bisphosphonate, unclear duration. At least 4 years per chart review. Will consider stopping in 2020 if no issues otherwise, could consider DEXA prior to stopping.     3. History of compression fracture of spine    4. Elevated BP without diagnosis of hypertension  BP borderline today, will monitor. Not on medications.     5. Overactive bladder  Stable on oxybutynin.     6. Raynaud's disease without gangrene  Has previously tried calcium channel blocker but didn't help symptoms. Otherwise stable.     Goals of Care  Discussed extensively with patient and her grand niece, she was interested in completing POLST today. Elected to be DNR/DNI. Form completed and sent to abstraction, patient given copy.        BMI:   Estimated body mass index is 27.22 kg/m  as calculated from the following:    Height as of this encounter: 1.461 m (4' 9.5\").    Weight as of this " encounter: 58.1 kg (128 lb).     Follow-up annually.     There are no Patient Instructions on file for this visit.    No follow-ups on file.     I spent 40 minutes with the patient, greater than 50% of that time was spent in counseling or coordination of the above issues.      Priscilla Llanos MD  [facility]

## 2019-12-30 ENCOUNTER — DOCUMENTATION ONLY (OUTPATIENT)
Dept: OTHER | Facility: CLINIC | Age: 84
End: 2019-12-30

## 2019-12-30 ENCOUNTER — AMBULATORY - HEALTHEAST (OUTPATIENT)
Dept: OTHER | Facility: CLINIC | Age: 84
End: 2019-12-30

## 2020-01-03 ENCOUNTER — COMMUNICATION - HEALTHEAST (OUTPATIENT)
Dept: INTERNAL MEDICINE | Facility: CLINIC | Age: 85
End: 2020-01-03

## 2020-01-03 DIAGNOSIS — M81.0 OSTEOPOROSIS: ICD-10-CM

## 2020-01-09 ENCOUNTER — COMMUNICATION - HEALTHEAST (OUTPATIENT)
Dept: INTERNAL MEDICINE | Facility: CLINIC | Age: 85
End: 2020-01-09

## 2020-01-09 DIAGNOSIS — M81.0 OSTEOPOROSIS: ICD-10-CM

## 2020-01-09 DIAGNOSIS — E87.6 HYPOKALEMIA: ICD-10-CM

## 2020-01-26 ENCOUNTER — COMMUNICATION - HEALTHEAST (OUTPATIENT)
Dept: INTERNAL MEDICINE | Facility: CLINIC | Age: 85
End: 2020-01-26

## 2020-01-26 DIAGNOSIS — E87.6 HYPOKALEMIA: ICD-10-CM

## 2020-01-26 DIAGNOSIS — M81.0 OSTEOPOROSIS: ICD-10-CM

## 2020-01-26 DIAGNOSIS — R39.15 URINARY URGENCY: ICD-10-CM

## 2020-01-30 ENCOUNTER — RECORDS - HEALTHEAST (OUTPATIENT)
Dept: ADMINISTRATIVE | Facility: OTHER | Age: 85
End: 2020-01-30

## 2020-02-28 DIAGNOSIS — Z86.39 HISTORY OF LOW POTASSIUM: Primary | ICD-10-CM

## 2020-02-29 ENCOUNTER — NURSE TRIAGE (OUTPATIENT)
Dept: NURSING | Facility: CLINIC | Age: 85
End: 2020-02-29

## 2020-02-29 RX ORDER — POTASSIUM CHLORIDE 750 MG/1
10 TABLET, EXTENDED RELEASE ORAL DAILY
Qty: 90 TABLET | Refills: 0 | Status: SHIPPED | OUTPATIENT
Start: 2020-02-29 | End: 2020-05-21

## 2020-02-29 NOTE — TELEPHONE ENCOUNTER
Clinic Action Needed:Yes, follow up with caregiver Neha    Reason for Call: Aleksandr Solomon who is family caregiver called to inquire about refill of Potassium.  She reports that Lynn has been taking 10 MEQ daily and last had lab work in October.    She was a former [facility] patient who switched care to Dr. Smith at [place] when her PCP retired.  She was last seen in [place] in December 2019.  No mention of potassium issues in that office visit, Klor-Con potassium replacement is noted as historical note in meds.    In April 2019 [facility] note states an Rx was given for Potassium 10 meq daily.    Paged on call provider for [facility] to speak to me at [facility].  Dr. Carlson is on call, page sent @ 11:13 am via smart web.     Dr. Carlson returned page, reviewed above with him.  He gave verbal to refill Klor Con- 10 Meq daily, dispense 90 tablets, no refills, follow up with PCP at clinic.      Routed to: ADELAIDE Goncalves, RN  [facility]

## 2020-03-02 ENCOUNTER — TELEPHONE (OUTPATIENT)
Dept: PEDIATRICS | Facility: CLINIC | Age: 85
End: 2020-03-02

## 2020-03-02 NOTE — TELEPHONE ENCOUNTER
MA/TREMAYNE-  Please schedule her in Dr. Sarah malave for next Thursday 3/12/20, back pain. She is aware. Thank you.       Went on a long tour 2/24/20 and did so much walking. She only used her cane. Has never walked that far with it before.     She has a lot of pain in her low back when she gets up and starts moving. Once in awhile she feels it in her legs. But not much.  Uses heat and 2 tylenol in the am. It is not helping much.     She can't take ibu because she gets such bad nosebleeds.     Discussed resting, ice, heat, elevate and increase the tylenol for a few days to see if it gives her some relief. If not, or if she gets worse, she will call.     Juliana Hutton RN on 3/2/2020 at 2:32 PM

## 2020-03-02 NOTE — TELEPHONE ENCOUNTER
Symptoms  Describe your symptoms: lower back pain that radiates across whole back    Any pain: Yes: - worse when moving.      How long have you been having symptoms: 7 days    Have you been seen for this:  No    Appointment offered?: No    Triage offered?: No    Home remedies tried: n/a    Requested Pharmacy: n/a    Okay to leave a detailed message? Yes at Home number on file [phone] (home)    Additional comments:  Patient states she went on a Tour 02/24/20, and only walked with her cane instead of her walker.  Patient states she has continued lower back pain that radiates across her back since this long walk.  Patient is unsure what she can do to relieve her pain, and would like a call back from the nurse.  Patient states it's not as bad when she is sitting, but if she tries to get up or move around, she is in a lot of pain.

## 2020-03-04 ENCOUNTER — TRANSFERRED RECORDS (OUTPATIENT)
Dept: HEALTH INFORMATION MANAGEMENT | Facility: CLINIC | Age: 85
End: 2020-03-04

## 2020-03-09 ENCOUNTER — OFFICE VISIT (OUTPATIENT)
Dept: PEDIATRICS | Facility: CLINIC | Age: 85
End: 2020-03-09
Payer: COMMERCIAL

## 2020-03-09 VITALS
TEMPERATURE: 99.2 F | OXYGEN SATURATION: 95 % | DIASTOLIC BLOOD PRESSURE: 72 MMHG | WEIGHT: 128.4 LBS | SYSTOLIC BLOOD PRESSURE: 152 MMHG | HEART RATE: 90 BPM | HEIGHT: 58 IN | BODY MASS INDEX: 26.95 KG/M2

## 2020-03-09 DIAGNOSIS — M54.41 ACUTE RIGHT-SIDED LOW BACK PAIN WITH RIGHT-SIDED SCIATICA: Primary | ICD-10-CM

## 2020-03-09 PROCEDURE — 99213 OFFICE O/P EST LOW 20 MIN: CPT | Performed by: INTERNAL MEDICINE

## 2020-03-09 ASSESSMENT — MIFFLIN-ST. JEOR: SCORE: 879.23

## 2020-03-09 NOTE — PROGRESS NOTES
Walter Soriano is a 89 year old female who presents to clinic today for the following health issues:    HPI   Back Pain       Duration: x 2 weeks         Specific cause: walking with walker     Description:   Location of pain: low back bilateral  Character of pain: sharp  Pain radiation:radiates into the right leg and radiates into the left leg  New numbness or weakness in legs, not attributed to pain:  no     Intensity: Currently 10/10, At its worst 10/10    History:   Pain interferes with job: YES  History of back problems: recurrent self limited episodes of low back pain in the past  Any previous MRI or X-rays: Yes- at [facility].  Date 03/04/20  Sees a specialist for back pain:  No  Therapies tried without relief: prednisone, tylenol      Alleviating factors:   Improved by: none       Precipitating factors:  Worsened by: Standing, Sitting and Walking        Patient seen in ED recently for onset of low R sided back pain after walking with her cane (usually uses a walker) for 1 hour while touring new living facility. No numbness or weakness, pain radiates to hips and buttocks, occasionally extends down back of her R leg. Was given course of prednisone in ED but this didn't help. No fevers or clear injury.     Patient Active Problem List   Diagnosis     Gastroesophageal reflux disease without esophagitis     Age-related osteoporosis without current pathological fracture     History of compression fracture of spine     Elevated BP without diagnosis of hypertension     Overactive bladder     Raynaud's disease without gangrene     Past Surgical History:   Procedure Laterality Date     VERTEBROPLASTY         Social History     Tobacco Use     Smoking status: Never Smoker     Smokeless tobacco: Never Used   Substance Use Topics     Alcohol use: Yes     Comment: rare     No family history on file.        Reviewed and updated as needed this visit by Provider         Review of Systems   ROS COMP: Constitutional,  "GI, , MSK, neuro systems are negative, except as otherwise noted.      Objective    BP (!) 152/72 (BP Location: Right arm, Patient Position: Sitting, Cuff Size: Adult Small)   Pulse 90   Temp 99.2  F (37.3  C) (Tympanic)   Ht 1.461 m (4' 9.5\")   Wt 58.2 kg (128 lb 6.4 oz)   SpO2 95%   BMI 27.30 kg/m    Body mass index is 27.3 kg/m .  Physical Exam   GENERAL: elderly woman, seated comfortably in NAD  MS: no gross musculoskeletal defects noted, no edema  Comprehensive back pain exam:  Tenderness of R lumbar paraspinal muscles, Range of motion not limited by pain, Lower extremity strength functional and equal on both sides and Lower extremity sensation normal and equal on both sides    Diagnostic Test Results:  Labs reviewed in Epic        Assessment & Plan     1. Acute right-sided low back pain with right-sided sciatica  Likely secondary to muscle injury/spams due to recent over exertion/overuse. Reassuring exam. Discussed supportive cares per PI, if not improving would refer to physical therapy. Not a candidate for muscle relaxer given age, and would avoid NSAIDs with asa allergy.        BMI:   Estimated body mass index is 27.3 kg/m  as calculated from the following:    Height as of this encounter: 1.461 m (4' 9.5\").    Weight as of this encounter: 58.2 kg (128 lb 6.4 oz).         Patient Instructions   I think this is a muscle injury to your lower back that is occasionally compressing the large nerve that runs down the back of your leg.    1. Acetaminophen 2 tabs of 650mg in the morning as usual, then 1 tab at noon and 1 tab in the evening.    2. Use heat regularly.    3. Stretch the sore area a couple of times per day.    4. Have your daughter help with stretches. Think about a tennis ball to massage out the back.    5. Pillow between your legs at night.       No follow-ups on file.    Priscilla Llanos MD  [facility] [place]    "

## 2020-03-09 NOTE — PATIENT INSTRUCTIONS
I think this is a muscle injury to your lower back that is occasionally compressing the large nerve that runs down the back of your leg.    1. Acetaminophen 2 tabs of 650mg in the morning as usual, then 1 tab at noon and 1 tab in the evening.    2. Use heat regularly.    3. Stretch the sore area a couple of times per day.    4. Have your daughter help with stretches. Think about a tennis ball to massage out the back.    5. Pillow between your legs at night.

## 2020-05-06 ENCOUNTER — VIRTUAL VISIT (OUTPATIENT)
Dept: PEDIATRICS | Facility: CLINIC | Age: 85
End: 2020-05-06
Payer: COMMERCIAL

## 2020-05-06 DIAGNOSIS — M54.41 ACUTE RIGHT-SIDED LOW BACK PAIN WITH RIGHT-SIDED SCIATICA: Primary | ICD-10-CM

## 2020-05-06 PROCEDURE — 99213 OFFICE O/P EST LOW 20 MIN: CPT | Mod: TEL | Performed by: INTERNAL MEDICINE

## 2020-05-06 NOTE — PROGRESS NOTES
"Lynn Soriano is a 89 year old female who is being evaluated via a billable telephone visit.      The patient has been notified of following:     \"This telephone visit will be conducted via a call between you and your physician/provider. We have found that certain health care needs can be provided without the need for a physical exam.  This service lets us provide the care you need with a short phone conversation.  If a prescription is necessary we can send it directly to your pharmacy.  If lab work is needed we can place an order for that and you can then stop by our lab to have the test done at a later time.    Telephone visits are billed at different rates depending on your insurance coverage. During this emergency period, for some insurers they may be billed the same as an in-person visit.  Please reach out to your insurance provider with any questions.    If during the course of the call the physician/provider feels a telephone visit is not appropriate, you will not be charged for this service.\"    Patient has given verbal consent for Telephone visit?  Yes    What phone number would you like to be contacted at? [phone]    How would you like to obtain your AVS? Mail a copy    Subjective     Lynn Soriano is a 89 year old female who presents to clinic today for the following health issues:    RUSTY Bailey calls in to follow-up for her back pain. She reports that this is still bothering her.     Has been trying a TENS machine without significant improvement, but isn't sure if she is using this correctly. Has been trying heat and exercises, neither of which seem to have significant improvement in her symptoms. Still has some aching in her back, but does note that this is perhaps getting gradually better.     Is nervous to take more acetaminophen than she already is.     Gets worse when she walks with her cane instead of her walker -- usually doesn't do this, only when she is at the grocery store.     No numbness, " weakness or tingling.     Patient Active Problem List   Diagnosis     Gastroesophageal reflux disease without esophagitis     Age-related osteoporosis without current pathological fracture     History of compression fracture of spine     Elevated BP without diagnosis of hypertension     Overactive bladder     Raynaud's disease without gangrene     Past Surgical History:   Procedure Laterality Date     VERTEBROPLASTY         Social History     Tobacco Use     Smoking status: Never Smoker     Smokeless tobacco: Never Used   Substance Use Topics     Alcohol use: Yes     Comment: rare     No family history on file.        Reviewed and updated as needed this visit by Provider  Meds         Review of Systems   ROS COMP: Constitutional, MSK, neuro systems are negative, except as otherwise noted.       Objective   Reported vitals:  There were no vitals taken for this visit.   PSYCH: Alert and oriented times 3; coherent speech, normal   rate and volume, able to articulate logical thoughts, able   to abstract reason, no tangential thoughts, no hallucinations   or delusions  Her affect is normal  RESP: No cough, no audible wheezing, able to talk in full sentences  Remainder of exam unable to be completed due to telephone visits            Assessment/Plan:    ICD-10-CM    1. Acute right-sided low back pain with right-sided sciatica  M54.41      Discussed with patient. It does seem that symptoms are starting to improve somewhat with exercises, heat, TENS unit. Given duration of symptoms, did discuss that she may benefit from lumbar MRI and possible referral for epidural injection. She is hesitant to do this due to ongoing COVID-19 pandemic. Will plan at this time to hold off as she is seeing some improvement; however, if symptoms worsen or do not improve in next 1-2 weeks will refer for MRI and injection.     No follow-ups on file.      Phone call duration:  14 minutes    Priscilla Smith MD  Internal  Medicine-Pediatrics

## 2020-05-20 NOTE — PROGRESS NOTES
Call placed to patient to see how she is doing    Pt states she uses her Tens unit, heat and tylenol intermittently with good relief    She is out walking regularly with her walker    Pt does not think she needs an MRI at this time.  Advised pt to marcelino the clinic with any more questions or concerns    Pt verbalized understanding and agrees to the plan    Thank you  Mabel Ying RN on 5/20/2020 at 10:07 AM

## 2020-06-18 DIAGNOSIS — M81.0 AGE-RELATED OSTEOPOROSIS WITHOUT CURRENT PATHOLOGICAL FRACTURE: Primary | ICD-10-CM

## 2020-06-18 NOTE — TELEPHONE ENCOUNTER
Reason for call:  Medication   If this is a refill request, has the caller requested the refill from the pharmacy already? Yes  Will the patient be using a [facility]? Yes  Name of the pharmacy and phone number for the current request: [facility]    Name of the medication requested: alendronate 70mg    Other request: req rx refill , req cb once sent    Phone number to reach patient:  Other phone number:  [phone]    Best Time:  any    Can we leave a detailed message on this number?  YES    Travel screening: Not Applicable

## 2020-06-18 NOTE — TELEPHONE ENCOUNTER
Routing refill request to provider for review/approval because:  Labs not current:  dexa  Medication is reported/historical  Vinod Galvez RN, BSN

## 2020-06-19 RX ORDER — ALENDRONATE SODIUM 70 MG/1
TABLET ORAL
Qty: 12 TABLET | Refills: 4 | Status: SHIPPED | OUTPATIENT
Start: 2020-06-19 | End: 2021-05-20

## 2020-06-22 ENCOUNTER — COMMUNICATION - HEALTHEAST (OUTPATIENT)
Dept: INTERNAL MEDICINE | Facility: CLINIC | Age: 85
End: 2020-06-22

## 2020-06-22 DIAGNOSIS — M81.0 OSTEOPOROSIS: ICD-10-CM

## 2020-07-14 ENCOUNTER — COMMUNICATION - HEALTHEAST (OUTPATIENT)
Dept: INTERNAL MEDICINE | Facility: CLINIC | Age: 85
End: 2020-07-14

## 2020-07-14 DIAGNOSIS — M81.0 OSTEOPOROSIS: ICD-10-CM

## 2020-07-14 DIAGNOSIS — R39.15 URINARY URGENCY: ICD-10-CM

## 2020-07-14 DIAGNOSIS — E87.6 HYPOKALEMIA: ICD-10-CM

## 2020-12-26 DIAGNOSIS — Z86.39 HISTORY OF LOW POTASSIUM: ICD-10-CM

## 2020-12-29 RX ORDER — POTASSIUM CHLORIDE 750 MG/1
TABLET, EXTENDED RELEASE ORAL
Qty: 90 TABLET | Refills: 1 | Status: SHIPPED | OUTPATIENT
Start: 2020-12-29 | End: 2021-05-20

## 2020-12-29 NOTE — TELEPHONE ENCOUNTER
Routing refill request to provider for review/approval because:  No lab results on file for potassium    Divya PARISI RN, BSN

## 2021-05-17 DIAGNOSIS — Z86.39 HISTORY OF LOW POTASSIUM: ICD-10-CM

## 2021-05-19 RX ORDER — POTASSIUM CHLORIDE 750 MG/1
TABLET, EXTENDED RELEASE ORAL
Qty: 90 TABLET | Refills: 1 | OUTPATIENT
Start: 2021-05-19

## 2021-05-19 NOTE — TELEPHONE ENCOUNTER
Pt has appointment tomorrow, routed FYI to PCP, please review refill at visit  Ivonne Jaffe RN, BSN  Message handled by CLINIC NURSE.

## 2021-05-19 NOTE — PATIENT INSTRUCTIONS
Stop alendronate.     Schedule a bone density scan.    Labs today.     Increase oxybutynin to 2 tabs in the morning.     Patient Education   Personalized Prevention Plan  You are due for the preventive services outlined below.  Your care team is available to assist you in scheduling these services.  If you have already completed any of these items, please share that information with your care team to update in your medical record.  Health Maintenance Due   Topic Date Due     ANNUAL REVIEW OF HM ORDERS  Never done     Diptheria Tetanus Pertussis (DTAP/TDAP/TD) Vaccine (1 - Tdap) Never done     Zoster (Shingles) Vaccine (1 of 2) Never done     Annual Wellness Visit  Never done     Pneumococcal Vaccine (1 of 1 - PPSV23) Never done     PHQ-2  01/01/2021     FALL RISK ASSESSMENT  05/06/2021

## 2021-05-19 NOTE — PROGRESS NOTES
"SUBJECTIVE:   Lynn Soriano is a 89 year old female who presents for Preventive Visit.      Patient has been advised of split billing requirements and indicates understanding: Yes   Are you in the first 12 months of your Medicare coverage?      Healthy Habits:     In general, how would you rate your overall health?  Fair    Frequency of exercise:  1 day/week    Duration of exercise:  Less than 15 minutes    Do you usually eat at least 4 servings of fruit and vegetables a day, include whole grains    & fiber and avoid regularly eating high fat or \"junk\" foods?  No    Taking medications regularly:  Yes    Medication side effects:  None    Ability to successfully perform activities of daily living:  Transportation requires assistance, shopping requires assistance and money management requires assistance    Home Safety:  No safety concerns identified    Hearing Impairment:  No hearing concerns    In the past 6 months, have you been bothered by leaking of urine? Yes    In general, how would you rate your overall mental or emotional health?  Fair      PHQ-2 Total Score: 4    Additional concerns today:  Yes    Do you feel safe in your environment? Yes    Have you ever done Advance Care Planning? (For example, a Health Directive, POLST, or a discussion with a medical provider or your loved ones about your wishes): Yes, advance care planning is on file.       Fall risk  Fallen 2 or more times in the past year?: No  Any fall with injury in the past year?: No    Cognitive Screening   1) Repeat 3 items (Leader, Season, Table)    2) Clock draw: ABNORMAL minute hand off  3) 3 item recall: Recalls 2 objects   Results: ABNORMAL clock, 1-2 items recalled: PROBABLE COGNITIVE IMPAIRMENT, **INFORM PROVIDER**    Mini-CogTM Copyright SUZ[employer] Courtney. Licensed by the author for use in [facility]; reprinted with permission (antonio@.[facility]). All rights reserved.        Do you have sleep apnea, excessive snoring or daytime drowsiness?: " no    Denies any SI, just occasionally feels like a burden to her family. Feels like they may be better off if she weren't around.     Uses walker to get around, is too unstable on a cane. No concerns about falls, but does worry about lightheadedness from time to time.   Reviewed and updated as needed this visit by clinical staff  Tobacco  Allergies    Med Hx  Surg Hx  Fam Hx  Soc Hx        Reviewed and updated as needed this visit by Provider    Meds             Social History     Tobacco Use     Smoking status: Never Smoker     Smokeless tobacco: Never Used   Substance Use Topics     Alcohol use: Yes     Comment: rare     If you drink alcohol do you typically have >3 drinks per day or >7 drinks per week? No    Alcohol Use 5/20/2021   Prescreen: >3 drinks/day or >7 drinks/week? No   Prescreen: >3 drinks/day or >7 drinks/week? -               Current providers sharing in care for this patient include:   Patient Care Team:  Priscilla Smith MD as PCP - General (Internal Medicine)  Priscilla Smith MD as Assigned PCP    The following health maintenance items are reviewed in Epic and correct as of today:  Health Maintenance Due   Topic Date Due     ANNUAL REVIEW OF  ORDERS  Never done     DTAP/TDAP/TD IMMUNIZATION (1 - Tdap) Never done     ZOSTER IMMUNIZATION (1 of 2) Never done     Pneumococcal Vaccine: 65+ Years (1 of 1 - PPSV23) Never done     FALL RISK ASSESSMENT  05/06/2021     Patient Active Problem List   Diagnosis     Gastroesophageal reflux disease without esophagitis     Age-related osteoporosis without current pathological fracture     History of compression fracture of spine     Elevated BP without diagnosis of hypertension     Overactive bladder     Raynaud's disease without gangrene     Past Surgical History:   Procedure Laterality Date     VERTEBROPLASTY         Social History     Tobacco Use     Smoking status: Never Smoker     Smokeless tobacco: Never Used   Substance Use Topics     Alcohol  "use: Yes     Comment: rare     History reviewed. No pertinent family history.          Mammogram Screening - Patient over age 75, has elected to discontinue screenings.  Pertinent mammograms are reviewed under the imaging tab.    Review of Systems   Constitutional: Negative for chills and fever.   HENT: Negative for congestion, ear pain, hearing loss and sore throat.    Eyes: Negative for pain and visual disturbance.   Respiratory: Positive for cough. Negative for shortness of breath.    Cardiovascular: Positive for peripheral edema. Negative for chest pain and palpitations.   Gastrointestinal: Negative for abdominal pain, constipation, diarrhea, heartburn, hematochezia and nausea.   Breasts:  Negative for tenderness, breast mass and discharge.   Genitourinary: Negative for dysuria, frequency, genital sores, hematuria, pelvic pain, urgency, vaginal bleeding and vaginal discharge.   Musculoskeletal: Positive for arthralgias. Negative for joint swelling and myalgias.   Skin: Negative for rash.   Neurological: Positive for headaches. Negative for dizziness, weakness and paresthesias.   Psychiatric/Behavioral: Positive for mood changes. The patient is nervous/anxious.          OBJECTIVE:   BP (!) 146/82 (BP Location: Right arm, Patient Position: Sitting, Cuff Size: Adult Small)   Pulse 96   Temp 99  F (37.2  C) (Temporal)   Resp 20   Ht 1.461 m (4' 9.5\")   Wt 56.9 kg (125 lb 8 oz)   BMI 26.69 kg/m   Estimated body mass index is 26.69 kg/m  as calculated from the following:    Height as of this encounter: 1.461 m (4' 9.5\").    Weight as of this encounter: 56.9 kg (125 lb 8 oz).  Physical Exam  GENERAL: healthy, alert and no distress  EYES: Eyes grossly normal to inspection, PERRL and conjunctivae and sclerae normal  HENT: ear canals and TM's normal, nose and mouth without ulcers or lesions  NECK: no adenopathy, no asymmetry, masses, or scars and thyroid normal to palpation  RESP: lungs clear to auscultation - no " rales, rhonchi or wheezes  CV: regular rate and rhythm, normal S1 S2, no S3 or S4, no murmur, click or rub, no peripheral edema and peripheral pulses strong  ABDOMEN: soft, nontender, no hepatosplenomegaly, no masses and bowel sounds normal  MS: no gross musculoskeletal defects noted, no edema  SKIN: no suspicious lesions or rashes  NEURO: Normal strength and tone, mentation intact and speech normal  PSYCH: mentation appears normal, affect normal/bright        ASSESSMENT / PLAN:   1. Encounter for Medicare annual wellness exam  Counseling as below. Patient has elected to stop screening mammogram, colonoscopy and lipids.     2. History of low potassium  Due for medication refill and labs.   - potassium chloride ER (K-TAB/KLOR-CON) 10 MEQ CR tablet; TAKE 1 TABLET(10 MEQ) BY MOUTH DAILY  Dispense: 90 tablet; Refill: 3  - Basic metabolic panel  (Ca, Cl, CO2, Creat, Gluc, K, Na, BUN)    3. Gastroesophageal reflux disease without esophagitis  Stable on current regimen.   - omeprazole (PRILOSEC) 20 MG DR capsule; TAKE 1 CAPSULE (20 MG TOTAL) BY MOUTH DAILY.  Dispense: 90 capsule; Refill: 3    4. Overactive bladder  Poorly controlled, will try increasing oxybutynin dosing.   - oxybutynin ER (DITROPAN-XL) 10 MG 24 hr tablet; Take 2 tablets (20 mg) by mouth daily  Dispense: 180 tablet; Refill: 3    5. Age-related osteoporosis without current pathological fracture  By chart review, patient has been on bisphosphonate for ~15 years (DEXA from 2016 stated she had been on this for 10 years at that time). Discussed stopping this medication as it is likely not significantly reducing fracture risk at this time, and puts her at risk for side effect. Likely patient would benefit from Prolia at this time, she is hesitant to commit to this however. Does have a hx of compression fx. Will obtain DEXA before determining further therapies.   - DX Hip/Pelvis/Spine; Future    6. Elevated BP without diagnosis of hypertension  Patient prefers to  "not treat, which is reasonable given mild elevation and risks for hypotension or relative hypotension in patient who is already at significant risk for falls and ambulates with walker.       COUNSELING:  Reviewed preventive health counseling, as reflected in patient instructions       Vision screening       Hearing screening       Bladder control       Osteoporosis prevention/bone health       Advanced Planning     Estimated body mass index is 26.69 kg/m  as calculated from the following:    Height as of this encounter: 1.461 m (4' 9.5\").    Weight as of this encounter: 56.9 kg (125 lb 8 oz).        She reports that she has never smoked. She has never used smokeless tobacco.      Appropriate preventive services were discussed with this patient, including applicable screening as appropriate for cardiovascular disease, diabetes, osteopenia/osteoporosis, and glaucoma.  As appropriate for age/gender, discussed screening for colorectal cancer, prostate cancer, breast cancer, and cervical cancer. Checklist reviewing preventive services available has been given to the patient.    Reviewed patients plan of care and provided an AVS. The Intermediate Care Plan ( asthma action plan, low back pain action plan, and migraine action plan) for Lynn meets the Care Plan requirement. This Care Plan has been established and reviewed with the Patient and family member.    Counseling Resources:  ATP IV Guidelines  Pooled Cohorts Equation Calculator  Breast Cancer Risk Calculator  Breast Cancer: Medication to Reduce Risk  FRAX Risk Assessment  ICSI Preventive Guidelines  Dietary Guidelines for Americans, 2010  USDA's MyPlate  ASA Prophylaxis  Lung CA Screening    Priscilla Llanos MD  [facility]    Identified Health Risks:  "

## 2021-05-20 ENCOUNTER — OFFICE VISIT (OUTPATIENT)
Dept: PEDIATRICS | Facility: CLINIC | Age: 86
End: 2021-05-20
Payer: COMMERCIAL

## 2021-05-20 VITALS
TEMPERATURE: 99 F | DIASTOLIC BLOOD PRESSURE: 72 MMHG | HEIGHT: 58 IN | HEART RATE: 96 BPM | SYSTOLIC BLOOD PRESSURE: 144 MMHG | BODY MASS INDEX: 26.35 KG/M2 | WEIGHT: 125.5 LBS | RESPIRATION RATE: 20 BRPM

## 2021-05-20 DIAGNOSIS — R03.0 ELEVATED BP WITHOUT DIAGNOSIS OF HYPERTENSION: ICD-10-CM

## 2021-05-20 DIAGNOSIS — Z86.39 HISTORY OF LOW POTASSIUM: ICD-10-CM

## 2021-05-20 DIAGNOSIS — K21.9 GASTROESOPHAGEAL REFLUX DISEASE WITHOUT ESOPHAGITIS: ICD-10-CM

## 2021-05-20 DIAGNOSIS — N32.81 OVERACTIVE BLADDER: ICD-10-CM

## 2021-05-20 DIAGNOSIS — Z00.00 ENCOUNTER FOR MEDICARE ANNUAL WELLNESS EXAM: Primary | ICD-10-CM

## 2021-05-20 DIAGNOSIS — M81.0 AGE-RELATED OSTEOPOROSIS WITHOUT CURRENT PATHOLOGICAL FRACTURE: ICD-10-CM

## 2021-05-20 LAB
ANION GAP SERPL CALCULATED.3IONS-SCNC: 6 MMOL/L (ref 3–14)
BUN SERPL-MCNC: 24 MG/DL (ref 7–30)
CALCIUM SERPL-MCNC: 9.2 MG/DL (ref 8.5–10.1)
CHLORIDE SERPL-SCNC: 106 MMOL/L (ref 94–109)
CO2 SERPL-SCNC: 26 MMOL/L (ref 20–32)
CREAT SERPL-MCNC: 0.67 MG/DL (ref 0.52–1.04)
GFR SERPL CREATININE-BSD FRML MDRD: 76 ML/MIN/{1.73_M2}
GLUCOSE SERPL-MCNC: 91 MG/DL (ref 70–99)
POTASSIUM SERPL-SCNC: 3.9 MMOL/L (ref 3.4–5.3)
SODIUM SERPL-SCNC: 138 MMOL/L (ref 133–144)

## 2021-05-20 PROCEDURE — 36415 COLL VENOUS BLD VENIPUNCTURE: CPT | Performed by: INTERNAL MEDICINE

## 2021-05-20 PROCEDURE — 99397 PER PM REEVAL EST PAT 65+ YR: CPT | Performed by: INTERNAL MEDICINE

## 2021-05-20 PROCEDURE — 99213 OFFICE O/P EST LOW 20 MIN: CPT | Mod: 25 | Performed by: INTERNAL MEDICINE

## 2021-05-20 PROCEDURE — 80048 BASIC METABOLIC PNL TOTAL CA: CPT | Performed by: INTERNAL MEDICINE

## 2021-05-20 RX ORDER — OXYBUTYNIN CHLORIDE 10 MG/1
20 TABLET, EXTENDED RELEASE ORAL DAILY
Qty: 180 TABLET | Refills: 3 | Status: SHIPPED | OUTPATIENT
Start: 2021-05-20 | End: 2022-02-16

## 2021-05-20 RX ORDER — POTASSIUM CHLORIDE 750 MG/1
TABLET, EXTENDED RELEASE ORAL
Qty: 90 TABLET | Refills: 3 | Status: SHIPPED | OUTPATIENT
Start: 2021-05-20 | End: 2022-02-16

## 2021-05-20 ASSESSMENT — ENCOUNTER SYMPTOMS
CONSTIPATION: 0
EYE PAIN: 0
ARTHRALGIAS: 1
DIZZINESS: 0
BREAST MASS: 0
PARESTHESIAS: 0
DIARRHEA: 0
FEVER: 0
MYALGIAS: 0
PALPITATIONS: 0
COUGH: 1
HEARTBURN: 0
ABDOMINAL PAIN: 0
HEMATURIA: 0
JOINT SWELLING: 0
HEMATOCHEZIA: 0
NAUSEA: 0
FREQUENCY: 0
SORE THROAT: 0
NERVOUS/ANXIOUS: 1
WEAKNESS: 0
DYSURIA: 0
HEADACHES: 1
CHILLS: 0
SHORTNESS OF BREATH: 0

## 2021-05-20 ASSESSMENT — PATIENT HEALTH QUESTIONNAIRE - PHQ9
SUM OF ALL RESPONSES TO PHQ QUESTIONS 1-9: 9
10. IF YOU CHECKED OFF ANY PROBLEMS, HOW DIFFICULT HAVE THESE PROBLEMS MADE IT FOR YOU TO DO YOUR WORK, TAKE CARE OF THINGS AT HOME, OR GET ALONG WITH OTHER PEOPLE: NOT DIFFICULT AT ALL
SUM OF ALL RESPONSES TO PHQ QUESTIONS 1-9: 9

## 2021-05-20 ASSESSMENT — ACTIVITIES OF DAILY LIVING (ADL)
CURRENT_FUNCTION: MONEY MANAGEMENT REQUIRES ASSISTANCE
CURRENT_FUNCTION: SHOPPING REQUIRES ASSISTANCE
CURRENT_FUNCTION: TRANSPORTATION REQUIRES ASSISTANCE

## 2021-05-20 ASSESSMENT — MIFFLIN-ST. JEOR: SCORE: 861.07

## 2021-05-20 NOTE — LETTER
May 24, 2021      Lynn Soriano  [address]    [place] [address]        Dear Lynn,     Your metabolic panel (which looks at electrolytes and blood sugar, as well as your kidney function) was normal. Please let me know if you have any concerns or questions!     Sincerely,     Priscilla Smith MD   Internal Medicine-Pediatrics     Resulted Orders   Basic metabolic panel  (Ca, Cl, CO2, Creat, Gluc, K, Na, BUN)   Result Value Ref Range    Sodium 138 133 - 144 mmol/L    Potassium 3.9 3.4 - 5.3 mmol/L    Chloride 106 94 - 109 mmol/L    Carbon Dioxide 26 20 - 32 mmol/L    Anion Gap 6 3 - 14 mmol/L    Glucose 91 70 - 99 mg/dL    Urea Nitrogen 24 7 - 30 mg/dL    Creatinine 0.67 0.52 - 1.04 mg/dL    GFR Estimate 76 >60 mL/min/[1.73_m2]      Comment:      Non  GFR Calc  Starting 12/18/2018, serum creatinine based estimated GFR (eGFR) will be   calculated using the Chronic Kidney Disease Epidemiology Collaboration   (CKD-EPI) equation.      GFR Estimate If Black 88 >60 mL/min/[1.73_m2]      Comment:       GFR Calc  Starting 12/18/2018, serum creatinine based estimated GFR (eGFR) will be   calculated using the Chronic Kidney Disease Epidemiology Collaboration   (CKD-EPI) equation.      Calcium 9.2 8.5 - 10.1 mg/dL

## 2021-05-21 ASSESSMENT — PATIENT HEALTH QUESTIONNAIRE - PHQ9: SUM OF ALL RESPONSES TO PHQ QUESTIONS 1-9: 9

## 2021-05-24 ENCOUNTER — ANCILLARY PROCEDURE (OUTPATIENT)
Dept: BONE DENSITY | Facility: CLINIC | Age: 86
End: 2021-05-24
Attending: INTERNAL MEDICINE
Payer: COMMERCIAL

## 2021-05-24 DIAGNOSIS — M81.0 AGE-RELATED OSTEOPOROSIS WITHOUT CURRENT PATHOLOGICAL FRACTURE: ICD-10-CM

## 2021-05-24 PROCEDURE — 77085 DXA BONE DENSITY AXL VRT FX: CPT | Performed by: FAMILY MEDICINE

## 2021-05-27 ENCOUNTER — RECORDS - HEALTHEAST (OUTPATIENT)
Dept: ADMINISTRATIVE | Facility: CLINIC | Age: 86
End: 2021-05-27

## 2021-05-28 ENCOUNTER — RECORDS - HEALTHEAST (OUTPATIENT)
Dept: ADMINISTRATIVE | Facility: CLINIC | Age: 86
End: 2021-05-28

## 2021-05-28 NOTE — TELEPHONE ENCOUNTER
Upcoming Appointment Question  When is the appointment: 4/23/19  What is your appointment for?: Six month follow up  Who is your appointment scheduled with?: PCP only  What is your question/concern?: Blayne is asking if Dr. Monson can speak to Lynn during office visit about her driving ability.  Family has concerns about her still driving at her age.  Patient's son Blayne, would like to speak to Dr. Monson about and can be reached at any time on the phone number he left.  Lynn would take a suggestion/advice from Dr. Monson but is reluctant to do as her children suggest.  Okay to leave a detailed message?: Yes

## 2021-05-29 ENCOUNTER — RECORDS - HEALTHEAST (OUTPATIENT)
Dept: ADMINISTRATIVE | Facility: CLINIC | Age: 86
End: 2021-05-29

## 2021-05-30 ENCOUNTER — RECORDS - HEALTHEAST (OUTPATIENT)
Dept: ADMINISTRATIVE | Facility: CLINIC | Age: 86
End: 2021-05-30

## 2021-05-30 VITALS — BODY MASS INDEX: 25.4 KG/M2 | WEIGHT: 126 LBS | HEIGHT: 59 IN

## 2021-05-30 VITALS — WEIGHT: 128 LBS | HEIGHT: 59 IN | BODY MASS INDEX: 25.8 KG/M2

## 2021-05-31 ENCOUNTER — RECORDS - HEALTHEAST (OUTPATIENT)
Dept: ADMINISTRATIVE | Facility: CLINIC | Age: 86
End: 2021-05-31

## 2021-05-31 VITALS — HEIGHT: 59 IN | BODY MASS INDEX: 25.4 KG/M2 | WEIGHT: 126 LBS

## 2021-06-01 ENCOUNTER — RECORDS - HEALTHEAST (OUTPATIENT)
Dept: ADMINISTRATIVE | Facility: CLINIC | Age: 86
End: 2021-06-01

## 2021-06-01 VITALS — BODY MASS INDEX: 25.45 KG/M2 | WEIGHT: 126 LBS

## 2021-06-01 VITALS — HEIGHT: 59 IN | WEIGHT: 124.4 LBS | BODY MASS INDEX: 25.08 KG/M2

## 2021-06-01 VITALS — HEIGHT: 59 IN | WEIGHT: 125 LBS | BODY MASS INDEX: 25.2 KG/M2

## 2021-06-01 NOTE — TELEPHONE ENCOUNTER
Who is calling:  Patient's daughter, Ivonne  Reason for Call:    Patient's daughter states patient wants to take a driving test to keep her license and be able to drive.  Ivonne is questioning where this is done.  She did not think it would be at the [facility].  Ivonne is requesting Provider's opinion.  Ivonne is not on Consent to Communicate. No health information shared.  Date of last appointment with primary care: 4/23/19  Okay to leave a detailed message: No

## 2021-06-01 NOTE — TELEPHONE ENCOUNTER
Called daughter Ivonne and let her know about the alternative driving assessments at [facility].    Gave phone number and she will contact them for more information.    Alyssa GAMBOA LPN .......... 9:09 AM  09/09/19

## 2021-06-02 ENCOUNTER — RECORDS - HEALTHEAST (OUTPATIENT)
Dept: ADMINISTRATIVE | Facility: CLINIC | Age: 86
End: 2021-06-02

## 2021-06-02 VITALS — HEIGHT: 59 IN | WEIGHT: 122 LBS | BODY MASS INDEX: 24.6 KG/M2

## 2021-06-02 VITALS — BODY MASS INDEX: 24.39 KG/M2 | WEIGHT: 121 LBS | HEIGHT: 59 IN

## 2021-06-02 NOTE — PROGRESS NOTES
"Office Visit - Physical    Lynn Soriano   89 y.o. female    Date of Visit: 10/24/2019    Chief Complaint   Patient presents with     Annual Wellness Visit     Fall     recent falls in last year       Subjective: Rightful 89-year-old patient here for annual wellness visit and routine physical hypertension chronic anemia chronic back pain reflux esophagitis and other medical concerns as mentioned    Wellness information reviewed in detail including health risk assessment cognitive assessment mood assessment and advanced directives.  Remains DNR/DNI per previous discussion.    General health has been stable without new complaints    Current regimen reviewed no change discussed use of all of her medications continues on alendronate although this likely could be discontinued next year.    We discussed her ability to drive at age 89 and discussed our expectations from earlier this year.  She continues to drive primarily on local streets during the day to familiar locations.  A formal driving evaluation administered by the [facility] is pending    No other new complaints    Personal social family history reviewed    Arthritic symptoms stable discussed use of medications as previously covered    ROS: A comprehensive review of systems was performed and was otherwise negative except as mentioned above.    Exam   She is mildly forgetful but does not have a major cognitive deficit skin negative changes of aging as noted head and neck unremarkable lungs clear heart normal abdomen benign extremities normal peripheral pulses normal remainder of physical exam unremarkable  /70 (Patient Site: Right Arm)   Pulse 80   Ht 4' 9.75\" (1.467 m)   Wt 123 lb (55.8 kg)   BMI 25.93 kg/m      Assessment and Plan    Stable wellness visit labs pending no change    Lynn was seen today for annual wellness visit and fall.    Diagnoses and all orders for this visit:    Age-related osteoporosis with current pathological fracture, " initial encounter  -     HM2(CBC w/o Differential); Future  -     Comprehensive Metabolic Panel; Future  -     HM2(CBC w/o Differential)  -     Comprehensive Metabolic Panel    Flu vaccine need  -     Influenza High Dose,Seasonal,PF 65+ Yrs  -     HM2(CBC w/o Differential); Future  -     Comprehensive Metabolic Panel; Future  -     HM2(CBC w/o Differential)  -     Comprehensive Metabolic Panel    Hypokalemia  -     HM2(CBC w/o Differential); Future  -     Comprehensive Metabolic Panel; Future  -     HM2(CBC w/o Differential)  -     Comprehensive Metabolic Panel    Anemia in stage 1 chronic kidney disease  -     HM2(CBC w/o Differential); Future  -     Comprehensive Metabolic Panel; Future  -     HM2(CBC w/o Differential)  -     Comprehensive Metabolic Panel    Essential hypertension  -     HM2(CBC w/o Differential); Future  -     Comprehensive Metabolic Panel; Future  -     HM2(CBC w/o Differential)  -     Comprehensive Metabolic Panel    Mechanical low back pain  -     HM2(CBC w/o Differential); Future  -     Comprehensive Metabolic Panel; Future  -     HM2(CBC w/o Differential)  -     Comprehensive Metabolic Panel    Gastroesophageal reflux disease without esophagitis  -     HM2(CBC w/o Differential); Future  -     Comprehensive Metabolic Panel; Future  -     HM2(CBC w/o Differential)  -     Comprehensive Metabolic Panel    Collapsed vertebra, not elsewhere classified, lumbar region, initial encounter for fracture   -     HM2(CBC w/o Differential); Future  -     Comprehensive Metabolic Panel; Future  -     HM2(CBC w/o Differential)  -     Comprehensive Metabolic Panel    Closed compression fracture of first lumbar vertebra, initial encounter (H)  -     HM2(CBC w/o Differential); Future  -     Comprehensive Metabolic Panel; Future  -     HM2(CBC w/o Differential)  -     Comprehensive Metabolic Panel    Rotator cuff syndrome, right  -     HM2(CBC w/o Differential); Future  -     Comprehensive Metabolic Panel;  Future  -     2(CBC w/o Differential)  -     Comprehensive Metabolic Panel    Routine general medical examination at a health care facility  -     2(CBC w/o Differential); Future  -     Comprehensive Metabolic Panel; Future  -     2(CBC w/o Differential)  -     Comprehensive Metabolic Panel              Medications:   Prior to Admission medications    Medication Sig Start Date End Date Taking? Authorizing Provider   acetaminophen (TYLENOL) 650 MG CR tablet Take 650 mg by mouth every 8 (eight) hours as needed for pain. Takes 2 tablets every morning   Yes PROVIDER, HISTORICAL   alendronate (FOSAMAX) 70 MG tablet Take 1 tablet (70 mg total) by mouth every 7 days. Take in the morning on an empty stomach with a full glass of water 30 minutes before food 4/23/19  Yes Hakan Monson MD   CALCIUM CARBONATE/VITAMIN D3 (CALCIUM 500 + D, D3, ORAL) Take by mouth 2 (two) times a day.   Yes PROVIDER, HISTORICAL   docusate sodium (COLACE) 100 MG capsule Take 100 mg by mouth 2 (two) times a day.   Yes PROVIDER, HISTORICAL   ferrous gluconate (FERGON) 324 MG tablet Take 324 mg by mouth daily with breakfast.   Yes PROVIDER, HISTORICAL   multivitamin therapeutic (THERAGRAN) tablet Take 1 tablet by mouth daily.   Yes PROVIDER, HISTORICAL   omega-3 fatty acids (FISH OIL) 500 mg cap Take by mouth 2 (two) times a day.   Yes PROVIDER, HISTORICAL   omeprazole (PRILOSEC) 20 MG capsule Take 1 capsule (20 mg total) by mouth daily. 4/23/19  Yes Hakan Monson MD   oxybutynin (DITROPAN XL) 10 MG ER tablet Take 1 tablet (10 mg total) by mouth daily. 4/23/19  Yes Hakan Monson MD   potassium chloride (KLOR-CON 10) 10 MEQ CR tablet Take 1 tablet (10 mEq total) by mouth daily. 4/23/19  Yes Hakan Monson MD   sodium chloride (OCEAN) 0.65 % nasal spray 1 spray into each nostril as needed for congestion.   Yes PROVIDER, HISTORICAL       Allergies:  Allergies   Allergen Reactions     Aspirin Other (See Comments)     Other reaction(s):  *Unknown       Penicillins        Immunizations:   Immunization History   Administered Date(s) Administered     DT (pediatric) 06/21/2000     Influenza [id number], 01/29/2010     Influenza high dose,seasonal,PF, 65+ yrs 10/15/2016, 10/12/2017, 10/23/2018, 10/24/2019     Influenza, inj, historic,unspecified 11/11/2008, 09/16/2009, 10/28/2010     Influenza, seasonal,quad inj 6-35 mos 10/03/2013, 09/26/2014     Influenza,seasonal, Inj IIV3 10/22/2004, 11/11/2008, 09/16/2009, 10/28/2010, 10/04/2011, 10/02/2012, 10/03/2013, 09/26/2014     Pneumo Conj 13-V (2010&after) 05/05/2015, 10/28/2016     Pneumo Polysac 23-V 08/02/2005       Past Medical History:   Past Medical History:   Diagnosis Date     Acute bronchiolitis      Arthritis      Essential hypertension     Created by Conversion  Replacement Utility updated for latest IMO load     GERD (gastroesophageal reflux disease)      Osteoporosis      Raynaud disease      Shingles        Past Surgical History: No past surgical history on file.    Family History: No family history on file.    Social History:   Social History     Socioeconomic History     Marital status:      Spouse name: Not on file     Number of children: Not on file     Years of education: Not on file     Highest education level: Not on file   Occupational History     Not on file   Social Needs     Financial resource strain: Not on file     Food insecurity:     Worry: Not on file     Inability: Not on file     Transportation needs:     Medical: Not on file     Non-medical: Not on file   Tobacco Use     Smoking status: Former Smoker     Smokeless tobacco: Never Used   Substance and Sexual Activity     Alcohol use: No     Drug use: Not on file     Sexual activity: Not on file   Lifestyle     Physical activity:     Days per week: Not on file     Minutes per session: Not on file     Stress: Not on file   Relationships     Social connections:     Talks on phone: Not on file     Gets together: Not on file      Attends Baptism service: Not on file     Active member of club or organization: Not on file     Attends meetings of clubs or organizations: Not on file     Relationship status: Not on file     Intimate partner violence:     Fear of current or ex partner: Not on file     Emotionally abused: Not on file     Physically abused: Not on file     Forced sexual activity: Not on file   Other Topics Concern     Not on file   Social History Narrative    The patient lives by herself.          Hakan Monson MD      Assessment and Plan:         The patient's current medical problems were reviewed.      The following health maintenance schedule was reviewed with the patient and provided in printed form in the after visit summary:   Health Maintenance   Topic Date Due     TD 18+ HE  03/03/1947     ZOSTER VACCINES (1 of 2) 03/03/1979     MEDICARE ANNUAL WELLNESS VISIT  03/03/1994     DXA SCAN  05/12/2017     INFLUENZA VACCINE RULE BASED (1) 08/01/2019     FALL RISK ASSESSMENT  08/30/2019     ADVANCE CARE PLANNING  04/19/2021     PNEUMOCOCCAL IMMUNIZATION 65+ HIGH/HIGHEST RISK  Completed        Subjective:   Chief Complaint: Lynn Soriano is an 89 y.o. female here for an Annual Wellness visit.   HPI:      Review of Systems:   Please see above.  The rest of the review of systems are negative for all systems.    Patient Care Team:  Hakan Monson MD as PCP - General  Hakan Monson MD as Assigned PCP     Patient Active Problem List   Diagnosis     Carpal Tunnel Syndrome     Acute Bronchitis     Mechanical low back pain     Osteoarthrosis     Edema     Anemia in chronic kidney disease     Raynaud's Phenomenon     Cervicalgia     Osteoporosis     Herpes Zoster (Shingles)     Essential Hypertension     Esophageal Reflux     Cough     Dizziness     Lumbar compression fracture (H)     Hypokalemia     Past Medical History:   Diagnosis Date     Acute bronchiolitis      Arthritis      Essential hypertension     Created by Conversion   Replacement Utility updated for latest IMO load     GERD (gastroesophageal reflux disease)      Osteoporosis      Raynaud disease      Shingles       No past surgical history on file.   No family history on file.   Social History     Socioeconomic History     Marital status:      Spouse name: Not on file     Number of children: Not on file     Years of education: Not on file     Highest education level: Not on file   Occupational History     Not on file   Social Needs     Financial resource strain: Not on file     Food insecurity:     Worry: Not on file     Inability: Not on file     Transportation needs:     Medical: Not on file     Non-medical: Not on file   Tobacco Use     Smoking status: Former Smoker     Smokeless tobacco: Never Used   Substance and Sexual Activity     Alcohol use: No     Drug use: Not on file     Sexual activity: Not on file   Lifestyle     Physical activity:     Days per week: Not on file     Minutes per session: Not on file     Stress: Not on file   Relationships     Social connections:     Talks on phone: Not on file     Gets together: Not on file     Attends Worship service: Not on file     Active member of club or organization: Not on file     Attends meetings of clubs or organizations: Not on file     Relationship status: Not on file     Intimate partner violence:     Fear of current or ex partner: Not on file     Emotionally abused: Not on file     Physically abused: Not on file     Forced sexual activity: Not on file   Other Topics Concern     Not on file   Social History Narrative    The patient lives by herself.       Current Outpatient Medications   Medication Sig Dispense Refill     acetaminophen (TYLENOL) 650 MG CR tablet Take 650 mg by mouth every 8 (eight) hours as needed for pain. Takes 2 tablets every morning       alendronate (FOSAMAX) 70 MG tablet Take 1 tablet (70 mg total) by mouth every 7 days. Take in the morning on an empty stomach with a full glass of water 30  "minutes before food 12 tablet 3     CALCIUM CARBONATE/VITAMIN D3 (CALCIUM 500 + D, D3, ORAL) Take by mouth 2 (two) times a day.       docusate sodium (COLACE) 100 MG capsule Take 100 mg by mouth 2 (two) times a day.       ferrous gluconate (FERGON) 324 MG tablet Take 324 mg by mouth daily with breakfast.       multivitamin therapeutic (THERAGRAN) tablet Take 1 tablet by mouth daily.       omega-3 fatty acids (FISH OIL) 500 mg cap Take by mouth 2 (two) times a day.       omeprazole (PRILOSEC) 20 MG capsule Take 1 capsule (20 mg total) by mouth daily. 90 capsule 3     oxybutynin (DITROPAN XL) 10 MG ER tablet Take 1 tablet (10 mg total) by mouth daily. 90 tablet 3     potassium chloride (KLOR-CON 10) 10 MEQ CR tablet Take 1 tablet (10 mEq total) by mouth daily. 90 tablet 3     sodium chloride (OCEAN) 0.65 % nasal spray 1 spray into each nostril as needed for congestion.       No current facility-administered medications for this visit.       Objective:   Vital Signs:   Visit Vitals  /70 (Patient Site: Right Arm)   Pulse 80   Ht 4' 9.75\" (1.467 m)   Wt 123 lb (55.8 kg)   BMI 25.93 kg/m         VisionScreening:  No exam data present     PHYSICAL EXAM    Assessment Results 10/24/2019   Activities of Daily Living No help needed   Instrumental Activities of Daily Living No help needed   Get Up and Go Score Less than 12 seconds   Mini Cog Total Score 3   Some recent data might be hidden     A Mini-Cog score of 0-2 suggests the possibility of dementia, score of 3-5 suggests no dementia    Identified Health Risks:     The patient was counseled and encouraged to consider modifying their diet and eating habits. She was provided with information on recommended healthy diet options.  Information on urinary incontinence and treatment options given to patient.  She is at risk for falling and has been provided with information to reduce the risk of falling at home.  Patient's advanced directive was discussed and  patient's " wishes.

## 2021-06-03 ENCOUNTER — TELEPHONE (OUTPATIENT)
Dept: PEDIATRICS | Facility: CLINIC | Age: 86
End: 2021-06-03

## 2021-06-03 VITALS
BODY MASS INDEX: 25.82 KG/M2 | HEART RATE: 80 BPM | HEIGHT: 58 IN | SYSTOLIC BLOOD PRESSURE: 136 MMHG | DIASTOLIC BLOOD PRESSURE: 70 MMHG | WEIGHT: 123 LBS

## 2021-06-03 VITALS — BODY MASS INDEX: 25.47 KG/M2 | WEIGHT: 126.12 LBS

## 2021-06-03 DIAGNOSIS — M81.8 OTHER OSTEOPOROSIS WITHOUT CURRENT PATHOLOGICAL FRACTURE: Primary | ICD-10-CM

## 2021-06-03 NOTE — TELEPHONE ENCOUNTER
Called patient and reviewed DEXA results, DEXA consistent with severe osteoporosis despite extended bisphosphonate use.     Discussed with patient that my recommendation would probably be to start Prolia injections. Reviewed risks, and need to take this long term without break in therapy. She is somewhat hesitant to start a medication that she needs to continue indefinitely, and would prefer to get a second opinion with endocrinology about next steps before starting this. Endocrine referral placed.    Priscilla Smith MD  Internal Medicine-Pediatrics

## 2021-06-04 NOTE — TELEPHONE ENCOUNTER
Refill Approved    Rx renewed per Medication Renewal Policy. Medication was last renewed on 4/23/19.    Alice Zayas, [facility] Triage/Med Refill 1/3/2020     Requested Prescriptions   Pending Prescriptions Disp Refills     alendronate (FOSAMAX) 70 MG tablet [Pharmacy Med Name: ALENDRONATE SODIUM 70 MG TAB] 12 tablet 3     Sig: TAKE 1 TABLET (70 MG TOTAL) BY MOUTH EVERY 7 DAYS. TAKE IN THE MORNING ON AN EMPTY STOMACH WITH A FULL GLASS OF WATER 30 MINUTES BEFORE FOOD       Biphosphonates Refill Protocol Passed - 1/3/2020  2:12 AM        Passed - PCP or prescribing provider visit in last 12 months     Last office visit with prescriber/PCP: 4/23/2019 Hakan Monson MD OR same dept: 4/23/2019 Hakan Monson MD OR same specialty: 4/23/2019 Hakan Monson MD  Last physical: 10/24/2019 Last MTM visit: Visit date not found   Next visit within 3 mo: Visit date not found  Next physical within 3 mo: Visit date not found  Prescriber OR PCP: Hakan Monson MD  Last diagnosis associated with med order: 1. Osteoporosis  - alendronate (FOSAMAX) 70 MG tablet [Pharmacy Med Name: ALENDRONATE SODIUM 70 MG TAB]; Take 1 tablet (70 mg total) by mouth every 7 days. Take in the morning on an empty stomach with a full glass of water 30 minutes before food  Dispense: 12 tablet; Refill: 3    If protocol passes may refill for 12 months if within 3 months of last provider visit (or a total of 15 months).             Passed - Serum creatinine in last 12 months     Creatinine   Date Value Ref Range Status   10/24/2019 0.77 0.60 - 1.10 mg/dL Final

## 2021-06-05 NOTE — TELEPHONE ENCOUNTER
RN cannot approve Refill Request    RN can NOT refill this medication med is not covered by policy/route to provider. Last office visit: 4/23/2019 Hakan Monson MD Last Physical: 10/24/2019 Last MTM visit: Visit date not found Last visit same specialty: 4/23/2019 Hakan Monson MD.  Next visit within 3 mo: Visit date not found  Next physical within 3 mo: Visit date not found      Alice Zayas, [facility] Connection Triage/Med Refill 1/26/2020    Requested Prescriptions   Pending Prescriptions Disp Refills     oxybutynin (DITROPAN XL) 10 MG ER tablet 30 tablet 0     Sig: Take 1 tablet (10 mg total) by mouth daily.       There is no refill protocol information for this order

## 2021-06-05 NOTE — TELEPHONE ENCOUNTER
Patient needs to establish care with new provider.  Please contact her to schedule her with someone ASAP.

## 2021-06-05 NOTE — TELEPHONE ENCOUNTER
Former patient of Dr JONAH Monson & has not established care with another provider.  Please assign refill request to covering provider per Clinic standard process.      RN cannot approve Refill Request    RN can NOT refill this medication med is not covered by policy/route to provider. Last office visit: 4/23/2019 Hakan Monson MD Last Physical: 10/24/2019 Last MTM visit: Visit date not found Last visit same specialty: 4/23/2019 Hakan Monson MD.  Next visit within 3 mo: Visit date not found  Next physical within 3 mo: Visit date not found      Kim Trejo, [facility] Triage/Med Refill 1/9/2020    Requested Prescriptions   Pending Prescriptions Disp Refills     oxybutynin (DITROPAN XL) 10 MG ER tablet [Pharmacy Med Name: OXYBUTYNIN CL ER 10 MG TABLET] 90 tablet 3     Sig: TAKE 1 TABLET BY MOUTH EVERY DAY       There is no refill protocol information for this order

## 2021-06-09 NOTE — PROGRESS NOTES
"Office Visit - Follow up    Lynn Soriano   88 y.o. female    Date of Visit: 3/14/2017    Chief Complaint   Patient presents with     Follow-up     Cough       Subjective: Lynn is here for follow-up of multiple conditions.  Has had recurrent epistaxis and has been followed most recently by Dr. Mendoza.  History of hypertension chronic neck and back pain and generalized osteoarthritis.  History of esophageal reflux we suspect it may have been contributing to her cough    Continues to have problems with a chronic cough this is nonproductive she has had extensive evaluation in the past including CT scan of the chest and evaluation by Dr. Friedman.    Chest x-ray today is again unremarkable.  No symptoms of hemoptysis no dyspnea.    ROS: A comprehensive review of systems was performed and was otherwise negative except as mentioned above.     Exam  Lungs are clear no rales wheezes or rhonchi good breath sounds ear nose and throat negative abdomen benign     Visit Vitals     /80     Pulse 80     Ht 4' 11\" (1.499 m)     Wt 126 lb (57.2 kg)     BMI 25.45 kg/m2       Assessment and Plan  Labs ordered as noted continue current regimen using fluticasone which has been of some help consider further evaluation of cough.  Follow-up here in 4 weeks    Lynn was seen today for follow-up.    Diagnoses and all orders for this visit:    Epistaxis, recurrent  -     HM2(CBC w/o Differential); Future  -     Comprehensive Metabolic Panel; Future  -     HM2(CBC w/o Differential)  -     Comprehensive Metabolic Panel    Cough  -     HM2(CBC w/o Differential); Future  -     Comprehensive Metabolic Panel; Future  -     XR Chest PA and Lateral  -     HM2(CBC w/o Differential)  -     Comprehensive Metabolic Panel    Essential hypertension with goal blood pressure less than 140/90  -     HM2(CBC w/o Differential); Future  -     Comprehensive Metabolic Panel; Future  -     HM2(CBC w/o Differential)  -     Comprehensive Metabolic " Panel    Cervicalgia  -     HM2(CBC w/o Differential); Future  -     Comprehensive Metabolic Panel; Future  -     HM2(CBC w/o Differential)  -     Comprehensive Metabolic Panel    Esophageal reflux  -     HM2(CBC w/o Differential); Future  -     Comprehensive Metabolic Panel; Future  -     HM2(CBC w/o Differential)  -     Comprehensive Metabolic Panel    Mechanical low back pain  -     HM2(CBC w/o Differential); Future  -     Comprehensive Metabolic Panel; Future  -     HM2(CBC w/o Differential)  -     Comprehensive Metabolic Panel    Osteoarthritis  -     HM2(CBC w/o Differential); Future  -     Comprehensive Metabolic Panel; Future  -     HM2(CBC w/o Differential)  -     Comprehensive Metabolic Panel    Other orders  -     fluticasone (FLOVENT HFA) 220 mcg/actuation inhaler; Inhale 2 puffs 2 (two) times a day.          Time: total time spent with the patient was 25 minutes of which >50% was spent in counseling and coordination of care        Allergies   Allergen Reactions     Penicillins        Medications :  Prior to Admission medications    Medication Sig Start Date End Date Taking? Authorizing Provider   alendronate (FOSAMAX) 70 MG tablet TAKE 1 TABLET (70 MG TOTAL) BY MOUTH EVERY 7 DAYS. 12/11/16  Yes Hakan Monson MD   CALCIUM CARBONATE/VITAMIN D3 (CALCIUM 500 + D, D3, ORAL) Take by mouth 2 (two) times a day.   Yes PROVIDER, HISTORICAL   docusate sodium (COLACE) 100 MG capsule Take 100 mg by mouth 2 (two) times a day.   Yes PROVIDER, HISTORICAL   ferrous gluconate (FERGON) 324 MG tablet Take 324 mg by mouth daily with breakfast.   Yes PROVIDER, HISTORICAL   furosemide (LASIX) 40 MG tablet Take 1 tablet (40 mg total) by mouth 2 (two) times a day. 9/17/15  Yes Hakan Monson MD   multivitamin therapeutic (THERAGRAN) tablet Take 1 tablet by mouth daily.   Yes PROVIDER, HISTORICAL   omega-3 fatty acids (FISH OIL) 500 mg cap Take by mouth 2 (two) times a day.   Yes PROVIDER, HISTORICAL   oxybutynin (DITROPAN  XL) 10 MG ER tablet Take 1 tablet (10 mg total) by mouth daily. 1/30/17 1/30/18 Yes Hakan Monson MD   potassium chloride (KLOR-CON) 10 MEQ CR tablet Take 1 tablet (10 mEq total) by mouth daily. 9/17/15  Yes Hakan Monson MD   sodium chloride (OCEAN) 0.65 % nasal spray 1 spray into each nostril as needed for congestion.   Yes PROVIDER, HISTORICAL   fluticasone (FLOVENT HFA) 220 mcg/actuation inhaler Inhale 2 puffs 2 (two) times a day. 3/14/17 3/14/18  Hakan Monson MD        Past Medical History:   Past Medical History:   Diagnosis Date     Acute bronchiolitis      Arthritis      Essential hypertension     Created by Conversion  Replacement Utility updated for latest IMO load     GERD (gastroesophageal reflux disease)      Osteoporosis      Raynaud disease      Shingles        Past Surgical History: No past surgical history on file.    Social History:   Social History     Social History     Marital status:      Spouse name: N/A     Number of children: N/A     Years of education: N/A     Occupational History     Not on file.     Social History Main Topics     Smoking status: Former Smoker     Smokeless tobacco: Not on file     Alcohol use Not on file     Drug use: Not on file     Sexual activity: Not on file     Other Topics Concern     Not on file     Social History Narrative    The patient lives by herself.        Family History: No family history on file.      Hakan Monson MD

## 2021-06-09 NOTE — TELEPHONE ENCOUNTER
Former patient of Ermias & has not established care with another provider.  Please assign refill request to covering provider per Clinic standard process.  RN cannot approve Refill Request    RN can NOT refill this medication med is not covered by policy/route to provider. Last office visit: 4/23/2019 Hakan Monson MD Last Physical: 10/24/2019 Last MTM visit: Visit date not found Last visit same specialty: 4/23/2019 Hakan Monson MD.  Next visit within 3 mo: Visit date not found  Next physical within 3 mo: Visit date not found      Brisa Hogue, [facility] Triage/Med Refill 7/14/2020    Requested Prescriptions   Pending Prescriptions Disp Refills     oxybutynin (DITROPAN XL) 10 MG ER tablet [Pharmacy Med Name: OXYBUTYNIN ER 10MG TABLETS] 90 tablet 0     Sig: TAKE 1 TABLET BY MOUTH EVERY DAILY       There is no refill protocol information for this order        omeprazole (PRILOSEC) 20 MG capsule [Pharmacy Med Name: OMEPRAZOLE 20MG CAPSULES] 90 capsule 3     Sig: TAKE 1 CAPSULE BY MOUTH EVERY DAILY       GI Medications Refill Protocol Passed - 7/14/2020  6:01 AM        Passed - PCP or prescribing provider visit in last 12 or next 3 months.     Last office visit with prescriber/PCP: 4/23/2019 Hakan Monson MD OR same dept: Visit date not found OR same specialty: 4/23/2019 Hakan Monson MD  Last physical: 10/24/2019 Last MTM visit: Visit date not found   Next visit within 3 mo: Visit date not found  Next physical within 3 mo: Visit date not found  Prescriber OR PCP: Hakan Monson MD  Last diagnosis associated with med order: 1. Urinary urgency  - oxybutynin (DITROPAN XL) 10 MG ER tablet [Pharmacy Med Name: OXYBUTYNIN ER 10MG TABLETS]; TAKE 1 TABLET BY MOUTH EVERY DAILY  Dispense: 90 tablet; Refill: 0    2. Osteoporosis  - omeprazole (PRILOSEC) 20 MG capsule [Pharmacy Med Name: OMEPRAZOLE 20MG CAPSULES]; TAKE 1 CAPSULE BY MOUTH EVERY DAILY  Dispense: 90 capsule; Refill: 3    3. Hypokalemia  -  omeprazole (PRILOSEC) 20 MG capsule [Pharmacy Med Name: OMEPRAZOLE 20MG CAPSULES]; TAKE 1 CAPSULE BY MOUTH EVERY DAILY  Dispense: 90 capsule; Refill: 3    If protocol passes may refill for 12 months if within 3 months of last provider visit (or a total of 15 months).

## 2021-06-10 NOTE — TELEPHONE ENCOUNTER
APPT NOTES: EMAIL LINK: [email or web address] - Other osteoporosis without current pathological fracture - Referred by CHAR MARIA - Per [facility]   APPT DATE: 6.29.21    NOTES (FOR ALL VISITS) STATUS DETAILS   OFFICE NOTES from referring provider Internal  CHAR MARIA    OFFICE NOTES from other specialist Internal  4.23.19 Gier      ED NOTES ce [facility]- 3.4.20 Tadeo      OPERATIVE REPORT  (thyroid, pituitary, adrenal, parathyroid)  (All op notes related to diagnoses) na    MEDICATION LIST Internal     IMAGING      DEXASCAN    (ALL) Internal  5.24.21, 5.14.15,    MRI (BRAIN)    (ALL) na    XR (Chest)    (ALL) Internal  3.14.17,    CT (HEAD/NECK/CHEST/ABDOMEN)    (ALL) ce [facility]- 11/18/19,    NUCLEAR    (ALL)  na    ULTRASOUND (HEAD/NECK) FNA BIOPSIES    (ALL) These images have pathology reports that need to be collected na    ULTRASOUND (HEAD/NECK)    (ALL) na    LABS     DIABETES: HBGA1C, CREATININE, FASTING LIPIDS, MICROALBUMIN URINE, POTASSIUM, TSH, T4    THYROID: TSH, T4, CBC, THYRODLONULIN, TOTAL T3, FREE T4, CALCITONIN, CEA Internal /ce Creatinine 3.20.18   Cbc- 10.24.19   Lipid 10.23.19   BMP- 5.20.21    PATHOLOGY REPORTS WITH CASE NUMBER  *All pathology reports, list case number related to DX  *Just report, no path slides  *Surgical path reports for endocrine organs (ovaries, testes, pancreas, etc)  NA

## 2021-06-10 NOTE — PROGRESS NOTES
"Office Visit - Follow up    Lynn Soriano   88 y.o. female    Date of Visit: 4/11/2017    Chief Complaint   Patient presents with     Follow Up     4 week follow up       Subjective: Tiny is seen for follow-up after recent visit 4 weeks ago.  I had given her  Ditropan for symptoms of intermittent urinary incontinence and she has found this to be quite helpful    History of chronic cough with negative workup in the past although has had mild recurrence.  I did give her fluticasone and she has been using this regularly she indicates the cough is much improved    She has completed a full inhaler of fluticasone and I would like to have her try to stop this if it recurs or worsens would restart this on a maintenance basis    Does have intermittent problems with heartburn we had discussed this in the past I had always felt that reflux was contributing to her chronic cough.  I have strongly encouraged her to restart omeprazole on a maintenance basis        ROS: A comprehensive review of systems was performed and was otherwise negative except as mentioned above.     Exam  Lungs again are clear head and neck is normal abdomen benign   /90  Pulse 84  Ht 4' 11\" (1.499 m)  Wt 128 lb (58.1 kg)  BMI 25.85 kg/m2    Assessment and Plan  Incontinence/urinary urgency improved on oxybutynin.    Chronic cough likely multifactorial with negative workup improved on fluticasone.    Chronic reflux esophagitis likely contributing to her chronic cough start omeprazole    She was hypokalemic we had adjusted her supplements she is not using furosemide we will recheck serum potassium    Lynn was seen today for follow up.    Diagnoses and all orders for this visit:    Gastroesophageal reflux disease without esophagitis    Cough    Hypokalemia  -     Potassium    Other orders  -     omeprazole (PRILOSEC) 20 MG capsule; Take 1 capsule (20 mg total) by mouth daily.          Time: total time spent with the patient was 25 minutes of " which >50% was spent in counseling and coordination of care        Allergies   Allergen Reactions     Penicillins        Medications :  Prior to Admission medications    Medication Sig Start Date End Date Taking? Authorizing Provider   alendronate (FOSAMAX) 70 MG tablet TAKE 1 TABLET (70 MG TOTAL) BY MOUTH EVERY 7 DAYS. 12/11/16  Yes Hakan Monson MD   CALCIUM CARBONATE/VITAMIN D3 (CALCIUM 500 + D, D3, ORAL) Take by mouth 2 (two) times a day.   Yes PROVIDER, HISTORICAL   docusate sodium (COLACE) 100 MG capsule Take 100 mg by mouth 2 (two) times a day.   Yes PROVIDER, HISTORICAL   ferrous gluconate (FERGON) 324 MG tablet Take 324 mg by mouth daily with breakfast.   Yes PROVIDER, HISTORICAL   fluticasone (FLOVENT HFA) 220 mcg/actuation inhaler Inhale 2 puffs 2 (two) times a day. 3/14/17 3/14/18 Yes Hakna Monson MD   furosemide (LASIX) 40 MG tablet Take 1 tablet (40 mg total) by mouth 2 (two) times a day. 9/17/15  Yes Hakan Monson MD   multivitamin therapeutic (THERAGRAN) tablet Take 1 tablet by mouth daily.   Yes PROVIDER, HISTORICAL   omega-3 fatty acids (FISH OIL) 500 mg cap Take by mouth 2 (two) times a day.   Yes PROVIDER, HISTORICAL   oxybutynin (DITROPAN XL) 10 MG ER tablet Take 1 tablet (10 mg total) by mouth daily. 1/30/17 1/30/18 Yes Hakan Monson MD   potassium chloride (KLOR-CON) 10 MEQ CR tablet Take 1 tablet (10 mEq total) by mouth daily. 9/17/15  Yes Hakan Monson MD   sodium chloride (OCEAN) 0.65 % nasal spray 1 spray into each nostril as needed for congestion.   Yes PROVIDER, HISTORICAL   omeprazole (PRILOSEC) 20 MG capsule Take 1 capsule (20 mg total) by mouth daily. 4/11/17   Hakan Monson MD        Past Medical History:   Past Medical History:   Diagnosis Date     Acute bronchiolitis      Arthritis      Essential hypertension     Created by Conversion  Replacement Utility updated for latest IMO load     GERD (gastroesophageal reflux disease)      Osteoporosis      Raynaud disease       Shingles        Past Surgical History: No past surgical history on file.    Social History:   Social History     Social History     Marital status:      Spouse name: N/A     Number of children: N/A     Years of education: N/A     Occupational History     Not on file.     Social History Main Topics     Smoking status: Former Smoker     Smokeless tobacco: Not on file     Alcohol use Not on file     Drug use: Not on file     Sexual activity: Not on file     Other Topics Concern     Not on file     Social History Narrative    The patient lives by herself.        Family History: No family history on file.      Hakan Monson MD

## 2021-06-13 NOTE — PROGRESS NOTES
"Office Visit - Follow up    Lynn Soriano   88 y.o. female    Date of Visit: 10/12/2017    Chief Complaint   Patient presents with     Follow-up     6 month follow up       Subjective: Femi is here for regular follow-up concerns including chronic back pain with previous lumbar compression fracture history of osteoarthritis generalized and osteoporosis.  Chronic cough which is multifactorial and related to seasonal allergies and reflux.  These have been treated.  She continues on omeprazole.  She does not like to use her inhaler.  Cough has not changed and she has had extensive workup including CT scan etc.    Intermittent dizziness by history stable without change    Was found to be hypokalemic last visit follow-up potassium normal on supplement    Current regimen reviewed and reconciled.  She finds the oxybutynin much more helpful for her intermittent incontinence    No other complaints    ROS: A comprehensive review of systems was performed and was otherwise negative except as mentioned above.     Exam  Head and neck negative EENT negative lungs clear heart normal no murmur abdomen soft extremities trace edema   /88  Pulse 66  Ht 4' 11\" (1.499 m)  Wt 126 lb (57.2 kg)  BMI 25.45 kg/m2    Assessment and Plan  Problems as noted above and below stable no changes current medications reviewed and reconciled and will remain unchanged    Lynn was seen today for follow-up.    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 Authorizing Provider   alendronate (FOSAMAX) 70 MG tablet TAKE 1 TABLET (70 MG TOTAL) BY MOUTH EVERY 7 DAYS. 12/11/16  Yes Hakan Monson MD   CALCIUM CARBONATE/VITAMIN D3 (CALCIUM 500 + D, D3, ORAL) Take by mouth 2 (two) times a day.   Yes PROVIDER, HISTORICAL   docusate sodium (COLACE) 100 MG capsule Take 100 mg by mouth 2 (two) times a day.   Yes PROVIDER, HISTORICAL   ferrous gluconate (FERGON) 324 MG tablet Take 324 mg by mouth daily with breakfast.   Yes PROVIDER, HISTORICAL   KLOR-CON 10 10 mEq CR tablet TAKE 1 TABLET (10 MEQ TOTAL) BY MOUTH DAILY. 7/28/17  Yes Wilfredo Jimenez MD   multivitamin therapeutic (THERAGRAN) tablet Take 1 tablet by mouth daily.   Yes PROVIDER, HISTORICAL   omega-3 fatty acids (FISH OIL) 500 mg cap Take by mouth 2 (two) times a day.   Yes PROVIDER, HISTORICAL   omeprazole (PRILOSEC) 20 MG capsule Take 1 capsule (20 mg total) by mouth daily. 4/11/17  Yes Hakan Monson MD   oxybutynin (DITROPAN XL) 10 MG ER tablet TAKE 1 TABLET (10 MG TOTAL) BY MOUTH DAILY. 7/18/17  Yes Wilfredo Jimenez MD   sodium chloride (OCEAN) 0.65 % nasal spray 1 spray into each nostril as needed for congestion.   Yes PROVIDER, HISTORICAL   fluticasone (FLOVENT HFA) 220 mcg/actuation inhaler Inhale 2 puffs 2 (two) times a day. 3/14/17 10/12/17  Hakan Monson MD        Past Medical History:   Past Medical History:   Diagnosis Date     Acute bronchiolitis      Arthritis      Essential hypertension     Created by Conversion  Replacement Utility updated for latest IMO load     GERD (gastroesophageal reflux disease)      Osteoporosis      Raynaud disease      Shingles        Past Surgical History: No past surgical history on file.    Social History:   Social History     Social History     Marital status:      Spouse name: N/A     Number of children: N/A     Years of education: N/A     Occupational History     Not on file.      Social History Main Topics     Smoking status: Former Smoker     Smokeless tobacco: Not on file     Alcohol use Not on file     Drug use: Not on file     Sexual activity: Not on file     Other Topics Concern     Not on file     Social History Narrative    The patient lives by herself.        Family History: No family history on file.      Hakan Monson MD

## 2021-06-16 NOTE — PROCEDURES
[facility] Radiology Post Procedure    Procedure: T10 vertebroplasty    Radiologist: Ari Vargas    Contrast: 0 mL omnipaque  Fluoro time: 4.4 minutes  Fluoro dose: 387 mGy    Medications: Versed 1 mg IV                        Fentanyl 50 mcg IV  Sedation Time: 15 minutes    EBL: minimal  Complications: none    Preliminary findings: (see dictation for full detail)  Technically successful T10 vertebroplasty.    Assess/Plan:   Routine post procedure sedation and puncture  Bedrest for 2 hours  Discharge when meets criteria     Ari Vargas

## 2021-06-16 NOTE — PROGRESS NOTES
"[facility] Interventional Neuroradiology Pre-Sedation Assessment    Reason for procedure: T10 compression fracture    HPI: Lynn Soriano is a 89 year old left-handed female who developed abrupt onset of back pain 2 weeks ago. Denies fall or trauma. Pain is in the lower thoracic/upper lumbar region. She has a history of osteoporosis and is on alendronate. She has tried Tylenol, Tramadol, and Vicodin without improvement and continues to have severe back pain. MRI was completed last evening and shows acute T10 fracture. Patient presents today for T10 vertebroplasty or kyphoplasty.     History and Physical Reviewed: yes     Past Medical History:   Diagnosis Date     Acute bronchiolitis      Arthritis      Essential hypertension     Created by Conversion  Replacement Utility updated for latest IMO load     GERD (gastroesophageal reflux disease)      Osteoporosis      Raynaud disease      Shingles        History reviewed. No pertinent surgical history.    Allergies:  Allergies   Allergen Reactions     Penicillins        Exam:   BP (!) 179/91 (Patient Position: Lying)  Pulse 91  Temp 98.3  F (36.8  C) (Oral)   Resp 16  Ht 4' 11\" (1.499 m)  Wt 124 lb 6.4 oz (56.4 kg)  SpO2 100%  BMI 25.13 kg/m2  General: pleasant, son at bedside   Neuro: alert and oriented x 4, speech is clear, moves all extremities  Cardiac: regular rate  Lungs: clear  Mallampati score: Class 3. Soft and hard palate and base of the uvula are visible  ASA: ASA III A patient with severe systemic disease    Previous reaction to sedation/anesthesia: no  Sedation based on assessment: moderate  NPO since: midnight       Labs:   Lab Results   Component Value Date    HGB 13.0 03/20/2018     Lab Results   Component Value Date     03/20/2018     Lab Results   Component Value Date    CREATININE 0.73 03/20/2018     Lab Results   Component Value Date    INR 0.99 03/20/2018       Imaging:   MR LUMBAR SPINE WO CONTRAST  3/19/2018 5:19 PM  CONCLUSION:  1.  " Degenerative and spondylotic changes throughout the lumbar spine accentuated by convex left lumbar curvature and compensatory convex right lower thoracic curvature.  2.  There is an acute-appearing compression fracture of the T10 vertebral body with approximately 50% loss of height centrally and bone edema throughout. Slight irregularity to the posterior cortex but no significant retropulsion. This is new from the   previous examination.  3.  There is a chronic-appearing compression fracture of the L1 vertebral body with greater than 50% loss of height centrally and slight irregularity to the posterior cortex but no bone edema to suggest this is an acute to subacute finding. However,   there has been ongoing and further collapse when compared to the previous examination.  4.  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 There is low-grade anterolisthesis of L4 on L5 and L5 on S1.  5.  Mild spinal canal stenosis and moderate right neural foraminal stenosis L5-S1.  6.  Severe bilateral foraminal stenosis L4-L5 but this was also present on the previous exam.  7.  Severe right and mild left neural foraminal stenosis L3-L4 but this is stable from the prior exam. There is also minimal spinal canal stenosis at this level.  8.  Moderate right and mild left neural foraminal stenosis L2-L3.  9.  Severe bilateral foraminal stenosis L1-L2.  10.  Facet hypertrophy throughout the lumbar spine.  11.  Degenerative change both hips but no evidence for fracture or avascular necrosis.       Impression: 89 year old female with osteoporosis and new T10 compression fracture not responding to medications. Patient is adequately NPO and medically stable to proceed with planned procedure using moderate intravenous sedation.    Plan: Thoracic 10 vertebroplasty or kyphoplasty under moderate intravenous sedation     The procedure and its indications, major risks, benefits, and alternatives were discussed with the patient and her son. Risks  including, but not limited to, worsening pain, bleeding, infection, allergic reaction, nerve damage, and cement extension into the spinal canal or adjacent spinal/paraspinal structures were discussed. The possibility that the procedure may not relieve pain was discussed. Questions answered and the patient gives written and verbal consent to proceed.      Shelley Gipson, CNP

## 2021-06-16 NOTE — PROGRESS NOTES
Office Visit - Follow up    Lynn Soriano   89 y.o. female    Date of Visit: 3/16/2018    Chief Complaint   Patient presents with     Follow-up     xray        Subjective: Lynn is here for follow-up after recent visit with Dr. Juan Guillen for back pain.  Notes from her visit of March 13 are reviewed in detail.  Her lumbar spine series is reviewed personally and discussed in detail with patient and her daughter.    Developed abrupt onset of pain over the past 2 weeks or so    Pain is in the upper lumbar or lower thoracic region.  Has been treated with tramadol with minimal relief thus far.  History of osteoporosis she is on alendronate which has been tolerated well her last DEXA scan was in 2015 and revealed stable bone density.    Current medications reviewed and reconciled.  Otherwise is feeling reasonably well without complaint.  Has not found the tramadol to be at all helpful.        ROS: A comprehensive review of systems was performed and was otherwise negative except as mentioned above.     Exam  Back exam reveals tenderness in the upper lumbar region no other gross deformity   /86 (Patient Site: Left Arm, Patient Position: Sitting, Cuff Size: Adult Regular)  Pulse 96  Wt 126 lb (57.2 kg)  SpO2 95%  BMI 25.45 kg/m2    Assessment and Plan  Discussed at length with patient.  Discussed plan for management of closed compression fracture.  As of now there has been minimal pain relief.  I have suggested use of nonsteroidal anti-inflammatory therapy.  Would not feel gabapentin or pregabalin will be helpful in this case.  Could consider transdermal lidocaine.    For now will increase analgesic to hydrocodone 1-2 tablets up to 4 times daily.  Would begin regular dosage of MiraLAX.  Plan follow-up by phone next week if not improved consider MRI and consider treatment with vertebroplasty.  Will continue to follow closely    Lynn was seen today for follow-up.    Diagnoses and all orders for this  visit:    Osteoporosis    Closed compression fracture of first lumbar vertebra, initial encounter    Other orders  -     HYDROcodone-acetaminophen 5-325 mg per tablet; Take 1-2 tablets by mouth every 4 (four) hours as needed for pain.          Time: total time spent with the patient was 30 minutes of which >50% was spent in counseling and coordination of care        Allergies   Allergen Reactions     Penicillins        Medications :  Prior to Admission medications    Medication Sig Start Date End Date Taking? Authorizing Provider   alendronate (FOSAMAX) 70 MG tablet TAKE 1 TABLET (70 MG TOTAL) BY MOUTH EVERY 7 DAYS. 2/28/18  Yes Hakan Monson MD   CALCIUM CARBONATE/VITAMIN D3 (CALCIUM 500 + D, D3, ORAL) Take by mouth 2 (two) times a day.   Yes PROVIDER, HISTORICAL   docusate sodium (COLACE) 100 MG capsule Take 100 mg by mouth 2 (two) times a day.   Yes PROVIDER, HISTORICAL   ferrous gluconate (FERGON) 324 MG tablet Take 324 mg by mouth daily with breakfast.   Yes PROVIDER, HISTORICAL   KLOR-CON 10 10 mEq CR tablet TAKE 1 TABLET (10 MEQ TOTAL) BY MOUTH DAILY. 10/23/17  Yes Wilfredo Jimenez MD   multivitamin therapeutic (THERAGRAN) tablet Take 1 tablet by mouth daily.   Yes PROVIDER, HISTORICAL   omega-3 fatty acids (FISH OIL) 500 mg cap Take by mouth 2 (two) times a day.   Yes PROVIDER, HISTORICAL   omeprazole (PRILOSEC) 20 MG capsule TAKE 1 CAPSULE (20 MG TOTAL) BY MOUTH DAILY. 1/5/18  Yes Hakan Monson MD   oxybutynin (DITROPAN XL) 10 MG ER tablet TAKE 1 TABLET (10 MG TOTAL) BY MOUTH DAILY. 7/18/17  Yes Wilfredo Jimenez MD   sodium chloride (OCEAN) 0.65 % nasal spray 1 spray into each nostril as needed for congestion.   Yes PROVIDER, HISTORICAL   HYDROcodone-acetaminophen 5-325 mg per tablet Take 1-2 tablets by mouth every 4 (four) hours as needed for pain. 3/16/18   Hakan Monson MD        Past Medical History:   Past Medical History:   Diagnosis Date     Acute bronchiolitis      Arthritis       Essential hypertension     Created by Conversion  Replacement Utility updated for latest IMO load     GERD (gastroesophageal reflux disease)      Osteoporosis      Raynaud disease      Shingles        Past Surgical History: No past surgical history on file.    Social History:   Social History     Social History     Marital status:      Spouse name: N/A     Number of children: N/A     Years of education: N/A     Occupational History     Not on file.     Social History Main Topics     Smoking status: Former Smoker     Smokeless tobacco: Never Used     Alcohol use No     Drug use: Not on file     Sexual activity: Not on file     Other Topics Concern     Not on file     Social History Narrative    The patient lives by herself.        Family History: No family history on file.      Hakan Monson MD

## 2021-06-17 NOTE — PATIENT INSTRUCTIONS - HE
Patient Instructions by Hakan Monson MD at 10/24/2019  9:40 AM     Author: Hakan Monson MD Service: -- Author Type: Physician    Filed: 10/24/2019  4:51 PM Encounter Date: 10/24/2019 Status: Signed    : Hakan Monson MD (Physician)         Patient Education   Understanding USDA MyPlate  The USDA (US Department of Agriculture) has guidelines to help you make healthy food choices. These are called MyPlate. MyPlate shows the food groups that make up healthy meals using the image of a place setting. Before you eat, think about the healthiest choices for what to put onto your plate or into your cup or bowl. To learn more about building a healthy plate, visit www.choosemyplate.gov.       The Food Groups    Fruits: Any fruit or 100% fruit juice counts as part of the Fruit Group. Fruits may be fresh, canned, frozen, or dried, and may be whole, cut-up, or pureed. Make half your plate fruits and vegetables.    Vegetables: Any vegetable or 100% vegetable juice counts as a member of the Vegetable Group. Vegetables may be fresh, frozen, canned, or dried. They can be served raw or cooked and may be whole, cut-up, or mashed. Make half your plate fruits and vegetables.     Grains: All foods made from grains are part of the Grains Group. These include wheat, rice, oats, cornmeal, and barley such as bread, pasta, oatmeal, cereal, tortillas, and grits. Grains should be no more than a quarter of your plate. At least half of your grains should be whole grains.    Protein: This group includes meat, poultry, seafood, beans and peas, eggs, processed soy products (like tofu), nuts (including nut butters), and seeds. Make protein choices no more than a quarter of your plate. Meat and poultry choices should be lean or low fat.    Dairy: All fluid milk products and foods made from milk that contain calcium, like yogurt and cheese are part of the Dairy Group. (Foods that have little calcium, such as cream, butter, and cream  cheese, are not part of the group.) Most dairy choices should be low-fat or fat-free.    Oils: These are fats that are liquid at room temperature. They include canola, corn, olive, soybean, and sunflower oil. Foods that are mainly oil include mayonnaise, certain salad dressings, and soft margarines. You should have only 5 to 7 teaspoons of oils a day. You probably already get this much from the food you eat.  Use [employer] to Help Build Your Meals  The [employer] can help you plan and track your meals and activity. You can look up individual foods to see or compare their nutritional value. You can get guidelines for what and how much you should eat. You can compare your food choices. And you can assess personal physical activities and see ways you can improve. Go to www.WeSpeke.gov/[employer]/.    3252-6020 [facility]. [address] [place] [address]. All rights reserved. This information is not intended as a substitute for professional medical care. Always follow your healthcare professional's instructions.           Patient Education   Urinary Incontinence, Female (Adult)  Urinary incontinence means loss of control of the bladder. This problem affects many women, especially as they get older. If you have incontinence, you may be embarrassed to ask for help. But know that this problem can be treated.  Types of Incontinence  There are different types of incontinence. Two of the main types are described here. You can have more than one type.    Stress incontinence. With this type, urine leaks when pressure (stress) is put on the bladder. This may happen when you cough, sneeze, or laugh. Stress incontinence most often occurs because the pelvic floor muscles that support the bladder and urethra are weak. This can happen after pregnancy and vaginal childbirth or a hysterectomy. It can also be due to excess body weight or hormone changes.    Urge incontinence (also called overactive  bladder). With this type, a sudden urge to urinate is felt often. This may happen even though there may not be much urine in the bladder. The need to urinate often during the night is common. Urge incontinence most often occurs because of bladder spasms. This may be due to bladder irritation or infection. Damage to bladder nerves or pelvic muscles, constipation, and certain medicines can also lead to urge incontinence.  Treatment of urinary incontinence depends on the cause. Further evaluation is needed to find the type you have. This will likely include an exam and certain tests. Based on the results, you and your healthcare provider can then plan treatment. Until a diagnosis is made, the home care tips below can help relieve symptoms.  Home care    Do pelvic floor muscle exercises, if they are prescribed. The pelvic floor muscles help support the bladder and urethra. Many women find that their symptoms improve when doing special exercises that strengthen these muscles. To do the exercises contract the muscles you would use to stop your stream of urine, but do this when youre not urinating. Hold for 10 seconds, then relax. Repeat 10 to 20 times in a row, at least 3 times a day. Your provider may give you other instructions for how to do the exercises and how often.    Keep a bladder diary. This helps track how often and how much you urinate over a set period of time. Bring this diary with you to your next visit with the provider. The information can help your provider learn more about your bladder problem.    Lose weight, if advised to by your provider. Excess weight puts pressure on the bladder. Your provider can help you create a weight-loss plan thats right for you. This may include exercising more and making certain diet changes.    Don't consume foods and drinks that may irritate the bladder. These can include alcohol and caffeinated drinks.    Quit smoking. Smoking and other tobacco use can lead to chronic  cough that strains the pelvic floor muscles. Smoking may also damage the bladder and urethra. Talk with your provider about treatments or methods you can use to quit smoking.    If drinking large amounts of fluid causes you to have symptoms, you may be advised to limit your fluid intake. You may also be advised to drink most of your fluids during the day and to limit fluids at night.    If youre worried about urine leakage or accidents, you may wear absorbent pads to catch urine. Change the pads often. This helps reduce discomfort. It may also reduce the risk of skin or bladder infections.  Follow-up care  Follow up with your healthcare provider, or as directed. It may take some to find the right treatment for your problem. Your treatment plan may include special therapies or medicines. Certain procedures or surgery may also be options. Be sure to discuss any questions you have with your provider.  When to seek medical advice  Call the healthcare provider right away if any of these occur:    Fever of 100.4 F (38 C) or higher, or as directed by your provider    Bladder pain or fullness    Abdominal swelling    Nausea or vomiting    Back pain    Weakness, dizziness or fainting  Date Last Reviewed: 10/1/2017    3003-1787 [employer]. [address] [place] [address]. All rights reserved. This information is not intended as a substitute for professional medical care. Always follow your healthcare professional's instructions.     Patient Education   Preventing Falls in the Home  As you get older, falls are more likely. Thats because your reaction time slows. Your muscles and joints may also get stiffer, making them less flexible. Illness, medications, and vision changes can also affect your balance. A fall could leave you unable to live on your own. To make your home safer, follow these tips:    Floors    Put nonskid pads under area rugs.    Remove throw rugs.    Replace worn floor  coverings.    Tack carpets firmly to each step on carpeted stairs. Put nonskid strips on the edges of uncarpeted stairs.    Keep floors and stairs free of clutter and cords.    Arrange furniture so there are clear pathways.    Clean up any spills right away.    Bathrooms    Install grab bars in the tub or shower.    Apply nonskid strips or put a nonskid rubber mat in the tub or shower.    Sit on a bath chair to bathe.    Use bathmats with nonskid backing.    Lighting    Keep a flashlight in each room.    Put a nightlight along the pathway between the bedroom and the bathroom.    7145-4153 [employer]. [address] [place] [address], [place] [address]. All rights reserved. This information is not intended as a substitute for professional medical care. Always follow your healthcare professional's instructions.           Advance Directive  Patients advance directive was discussed and I am comfortable with the patients wishes.  Patient Education   Personalized Prevention Plan  You are due for the preventive services outlined below.  Your care team is available to assist you in scheduling these services.  If you have already completed any of these items, please share that information with your care team to update in your medical record.  Health Maintenance   Topic Date Due   ? TD 18+ HE  03/03/1947   ? ZOSTER VACCINES (1 of 2) 03/03/1979   ? MEDICARE ANNUAL WELLNESS VISIT  03/03/1994   ? DXA SCAN  05/12/2017   ? INFLUENZA VACCINE RULE BASED (1) 08/01/2019   ? FALL RISK ASSESSMENT  08/30/2019   ? ADVANCE CARE PLANNING  04/19/2021   ? PNEUMOCOCCAL IMMUNIZATION 65+ HIGH/HIGHEST RISK  Completed

## 2021-06-17 NOTE — PROGRESS NOTES
"Office Visit - Follow up    Lynn Soriano   89 y.o. female    Date of Visit: 4/19/2018    Chief Complaint   Patient presents with     Follow-up     6 month follow up       Subjective: Here for follow-up of recent T10 compression fracture    Initially had radiographic evidence of lumbar compression fracture which was treated with analgesics.  Despite narcotic analgesic she had significant disabling pain.  We elected to proceed with MRI scan which revealed a new T10 compression fracture.  Underwent vertebral plasty which was uncomplicated and since this procedure on March 20 she has noted significant improvement and has required no recent narcotics she does use acetaminophen as needed no other major health concerns or recent complaints    ROS: A comprehensive review of systems was performed and was otherwise negative except as mentioned above.     Exam  Unchanged back is nontender   /80  Pulse 88  Ht 4' 11\" (1.499 m)  Wt 125 lb (56.7 kg)  BMI 25.25 kg/m2    Assessment and Plan  Follow-up after successful vertebroplasty during for acute T10 compression fracture.  Does have evidence of significant osteoporosis and is on alendronate we will continue with this.    Had refractory pain which was unresponsive with adequate response to narcotic analgesics and other analgesics therefore we did proceed with vertebroplasty which has been extremely successful    Lynn was seen today for follow-up.    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 Authorizing Provider "   acetaminophen (TYLENOL) 325 MG tablet Take 650 mg by mouth every 6 (six) hours as needed for pain. Takes 2 tablets every morning   Yes PROVIDER, HISTORICAL   alendronate (FOSAMAX) 70 MG tablet TAKE 1 TABLET (70 MG TOTAL) BY MOUTH EVERY 7 DAYS. 2/28/18  Yes Hakan Monson MD   CALCIUM CARBONATE/VITAMIN D3 (CALCIUM 500 + D, D3, ORAL) Take by mouth 2 (two) times a day.   Yes PROVIDER, HISTORICAL   docusate sodium (COLACE) 100 MG capsule Take 100 mg by mouth 2 (two) times a day.   Yes PROVIDER, HISTORICAL   ferrous gluconate (FERGON) 324 MG tablet Take 324 mg by mouth daily with breakfast.   Yes PROVIDER, HISTORICAL   KLOR-CON 10 10 mEq CR tablet TAKE 1 TABLET (10 MEQ TOTAL) BY MOUTH DAILY. 10/23/17  Yes Wilfredo Jimenez MD   multivitamin therapeutic (THERAGRAN) tablet Take 1 tablet by mouth daily.   Yes PROVIDER, HISTORICAL   omega-3 fatty acids (FISH OIL) 500 mg cap Take by mouth 2 (two) times a day.   Yes PROVIDER, HISTORICAL   oxybutynin (DITROPAN XL) 10 MG ER tablet TAKE 1 TABLET (10 MG TOTAL) BY MOUTH DAILY. 7/18/17  Yes Wilfredo Jimenez MD   sodium chloride (OCEAN) 0.65 % nasal spray 1 spray into each nostril as needed for congestion.   Yes PROVIDER, HISTORICAL   omeprazole (PRILOSEC) 20 MG capsule TAKE 1 CAPSULE (20 MG TOTAL) BY MOUTH DAILY. 1/5/18 4/19/18 Yes Hakna Monson MD   potassium chloride (MICRO-K) 10 mEq CR capsule Take 10 mEq by mouth. 4/4/14 4/19/18 Yes PROVIDER, HISTORICAL        Past Medical History:   Past Medical History:   Diagnosis Date     Acute bronchiolitis      Arthritis      Essential hypertension     Created by Conversion  Replacement Utility updated for latest IMO load     GERD (gastroesophageal reflux disease)      Osteoporosis      Raynaud disease      Shingles        Past Surgical History: No past surgical history on file.    Social History:   Social History     Social History     Marital status:      Spouse name: N/A     Number of children: N/A     Years of  education: N/A     Occupational History     Not on file.     Social History Main Topics     Smoking status: Former Smoker     Smokeless tobacco: Never Used     Alcohol use No     Drug use: Not on file     Sexual activity: Not on file     Other Topics Concern     Not on file     Social History Narrative    The patient lives by herself.        Family History: No family history on file.      Hakan Monson MD

## 2021-06-19 NOTE — LETTER
Letter by Hakan Monson MD at      Author: Hakan Monson MD Service: -- Author Type: --    Filed:  Encounter Date: 10/24/2019 Status: Signed         Lynn Soriano  [address]  [place] [address]             October 24, 2019         Dear Ms. Soriano,    Below are the results from your recent visit:    Resulted Orders   HM2(CBC w/o Differential)   Result Value Ref Range    WBC 5.9 4.0 - 11.0 thou/uL    RBC 4.36 3.80 - 5.40 mill/uL    Hemoglobin 14.1 12.0 - 16.0 g/dL    Hematocrit 42.5 35.0 - 47.0 %    MCV 97 80 - 100 fL    MCH 32.2 27.0 - 34.0 pg    MCHC 33.1 32.0 - 36.0 g/dL    RDW 11.9 11.0 - 14.5 %    Platelets 186 140 - 440 thou/uL    MPV 7.6 7.0 - 10.0 fL   Comprehensive Metabolic Panel   Result Value Ref Range    Sodium 140 136 - 145 mmol/L    Potassium 4.4 3.5 - 5.0 mmol/L    Chloride 105 98 - 107 mmol/L    CO2 26 22 - 31 mmol/L    Anion Gap, Calculation 9 5 - 18 mmol/L    Glucose 92 70 - 125 mg/dL    BUN 13 8 - 28 mg/dL    Creatinine 0.77 0.60 - 1.10 mg/dL    GFR MDRD Af Amer >60 >60 mL/min/1.73m2    GFR MDRD Non Af Amer >60 >60 mL/min/1.73m2    Bilirubin, Total 0.6 0.0 - 1.0 mg/dL    Calcium 9.7 8.5 - 10.5 mg/dL    Protein, Total 7.3 6.0 - 8.0 g/dL    Albumin 4.0 3.5 - 5.0 g/dL    Alkaline Phosphatase 40 (L) 45 - 120 U/L    AST 23 0 - 40 U/L    ALT 19 0 - 45 U/L    Narrative    Fasting Glucose reference range is 70-99 mg/dL per  American Diabetes Association (ADA) guidelines.       All labs look great    It has been a pleasure knowing you and caring for you these many years.  Best of luck to you and my deepest gratitude for being such a pleasant patient    Please call with questions or contact us using [employer].    Sincerely,        Electronically signed by Hakan Monson MD

## 2021-06-20 NOTE — PROGRESS NOTES
"Office Visit - Follow up    Lynn Soriano   89 y.o. female    Date of Visit: 8/30/2018    Chief Complaint   Patient presents with     Follow-up     Urgency room for fall       Subjective: Lynn is well known to me she is an 89-year-old independent living woman with history of hypertension reflux esophagitis degenerative arthritis and mild renal insufficiency    Was seen and a [place] urgent care facility with injuries related to a fall.  X-ray of the right shoulder was negative however she has noted significant pain with range of motion of the shoulder since her fall which occurred 1 week ago    Past history reviewed.  She also notes some discomfort in the left knee after injuries related to a fall this was not x-rayed she has pain with movement although is able to bear weight without difficulty    ROS: A comprehensive review of systems was performed and was otherwise negative except as mentioned above.     Exam  Left shoulder exam reveals type I impingement and significant pain with external rotation.  I suspect significant rotator cuff injury and will refer to [facility]    Left knee exam otherwise unremarkable    Current medications reviewed no changes       /90  Pulse 82  Ht 4' 11\" (1.499 m)  Wt 121 lb (54.9 kg)  BMI 24.44 kg/m2    Assessment and Plan  As noted referred to [facility]    No change in regimen vital signs blood pressure etc. stable she will keep her follow-up visit scheduled for October    Lynn was seen today for follow-up.    Diagnoses and all orders for this visit:    Rotator cuff syndrome, right  -     Ambulatory referral to Orthopedic Surgery    Essential hypertension          Time: total time spent with the patient was 25 minutes of which >50% was spent in counseling and coordination of care        Allergies   Allergen Reactions     Penicillins        Medications :  Prior to Admission medications    Medication Sig Start Date End Date Taking? Authorizing Provider "   acetaminophen (TYLENOL) 325 MG tablet Take 650 mg by mouth every 6 (six) hours as needed for pain. Takes 2 tablets every morning   Yes PROVIDER, HISTORICAL   alendronate (FOSAMAX) 70 MG tablet TAKE 1 TABLET (70 MG TOTAL) BY MOUTH EVERY 7 DAYS. 8/5/18  Yes Hakan Monson MD   CALCIUM CARBONATE/VITAMIN D3 (CALCIUM 500 + D, D3, ORAL) Take by mouth 2 (two) times a day.   Yes PROVIDER, HISTORICAL   docusate sodium (COLACE) 100 MG capsule Take 100 mg by mouth 2 (two) times a day.   Yes PROVIDER, HISTORICAL   ferrous gluconate (FERGON) 324 MG tablet Take 324 mg by mouth daily with breakfast.   Yes PROVIDER, HISTORICAL   KLOR-CON 10 10 mEq CR tablet TAKE 1 TABLET (10 MEQ TOTAL) BY MOUTH DAILY. 10/23/17  Yes Wilfredo Jimenez MD   multivitamin therapeutic (THERAGRAN) tablet Take 1 tablet by mouth daily.   Yes PROVIDER, HISTORICAL   omega-3 fatty acids (FISH OIL) 500 mg cap Take by mouth 2 (two) times a day.   Yes PROVIDER, HISTORICAL   omeprazole (PRILOSEC) 20 MG capsule TAKE 1 CAPSULE (20 MG TOTAL) BY MOUTH DAILY. 6/29/18  Yes Hakan Monson MD   oxybutynin (DITROPAN XL) 10 MG ER tablet TAKE 1 TABLET (10 MG TOTAL) BY MOUTH DAILY. 7/18/17  Yes Wilfredo Jimenez MD   sodium chloride (OCEAN) 0.65 % nasal spray 1 spray into each nostril as needed for congestion.   Yes PROVIDER, HISTORICAL        Past Medical History:   Past Medical History:   Diagnosis Date     Acute bronchiolitis      Arthritis      Essential hypertension     Created by Conversion  Replacement Utility updated for latest IMO load     GERD (gastroesophageal reflux disease)      Osteoporosis      Raynaud disease      Shingles        Past Surgical History: No past surgical history on file.    Social History:   Social History     Social History     Marital status:      Spouse name: N/A     Number of children: N/A     Years of education: N/A     Occupational History     Not on file.     Social History Main Topics     Smoking status: Former Smoker      Smokeless tobacco: Never Used     Alcohol use No     Drug use: Not on file     Sexual activity: Not on file     Other Topics Concern     Not on file     Social History Narrative    The patient lives by herself.        Family History: No family history on file.      Hakan Monson MD

## 2021-06-21 NOTE — PROGRESS NOTES
"Office Visit - Follow up    Lynn Soriano   89 y.o. female    Date of Visit: 10/23/2018    Chief Complaint   Patient presents with     Follow-up     Check up       Subjective: Delightful 89-year-old woman here for regular follow-up of osteoporosis history of right rotator cuff syndrome hypertension rainouts phenomenon and chronic anemia.  History of previous lumbar compression fracture treated with vertebroplasty stable intermittent mechanical back pain stable    Generally she has had a good few months she denies any new specific concerns or complaints generally has remained stable with adequate appetite denies any significant GI or  symptoms.    Current regimen reviewed no changes    Comprehensive labs will be ordered to include metabolic profile CBC and lipid panel    No other new questions    ROS: A comprehensive review of systems was performed and was otherwise negative except as mentioned above.     Exam  Alert and oriented no evidence of significant cognitive deficit head and neck negative lungs clear heart normal abdomen benign breasts negative   /78  Pulse 78  Ht 4' 11\" (1.499 m)  Wt 122 lb (55.3 kg)  BMI 24.64 kg/m2    Assessment and Plan  Stable conditions as noted above and below no changes follow-up in 4 months    Lynn was seen today for follow-up.    Diagnoses and all orders for this visit:    Rotator cuff syndrome, right  -     HM2(CBC w/o Differential); Future  -     Comprehensive Metabolic Panel; Future  -     Lipid Cascade; Future  -     HM2(CBC w/o Differential)  -     Comprehensive Metabolic Panel  -     Lipid Cascade    Osteoporosis  -     alendronate (FOSAMAX) 70 MG tablet; Take 1 tablet (70 mg total) by mouth every 7 days. Take in the morning on an empty stomach with a full glass of water 30 minutes before food  -     HM2(CBC w/o Differential); Future  -     Comprehensive Metabolic Panel; Future  -     Lipid Cascade; Future  -     HM2(CBC w/o Differential)  -     Comprehensive " Metabolic Panel  -     Lipid Cascade    Hypokalemia  -     potassium chloride (KLOR-CON 10) 10 MEQ CR tablet; Take 1 tablet (10 mEq total) by mouth daily.  -     HM2(CBC w/o Differential); Future  -     Comprehensive Metabolic Panel; Future  -     Lipid Cascade; Future  -     HM2(CBC w/o Differential)  -     Comprehensive Metabolic Panel  -     Lipid Cascade    Essential hypertension  -     HM2(CBC w/o Differential); Future  -     Comprehensive Metabolic Panel; Future  -     Lipid Cascade; Future  -     HM2(CBC w/o Differential)  -     Comprehensive Metabolic Panel  -     Lipid Cascade    Raynaud's disease without gangrene  -     HM2(CBC w/o Differential); Future  -     Comprehensive Metabolic Panel; Future  -     Lipid Cascade; Future  -     HM2(CBC w/o Differential)  -     Comprehensive Metabolic Panel  -     Lipid Cascade    Anemia in stage 1 chronic kidney disease  -     HM2(CBC w/o Differential); Future  -     Comprehensive Metabolic Panel; Future  -     Lipid Cascade; Future  -     HM2(CBC w/o Differential)  -     Comprehensive Metabolic Panel  -     Lipid Cascade    Closed compression fracture of first lumbar vertebra, initial encounter (H)  -     HM2(CBC w/o Differential); Future  -     Comprehensive Metabolic Panel; Future  -     Lipid Cascade; Future  -     HM2(CBC w/o Differential)  -     Comprehensive Metabolic Panel  -     Lipid Cascade    Gastroesophageal reflux disease without esophagitis  -     HM2(CBC w/o Differential); Future  -     Comprehensive Metabolic Panel; Future  -     Lipid Cascade; Future  -     HM2(CBC w/o Differential)  -     Comprehensive Metabolic Panel  -     Lipid Cascade    Mechanical low back pain  -     HM2(CBC w/o Differential); Future  -     Comprehensive Metabolic Panel; Future  -     Lipid Cascade; Future  -     HM2(CBC w/o Differential)  -     Comprehensive Metabolic Panel  -     Lipid Cascade    Other orders  -     Influenza High Dose, Seasonal 65+ yrs  -     omeprazole  (PRILOSEC) 20 MG capsule; Take 1 capsule (20 mg total) by mouth daily.  -     oxybutynin (DITROPAN XL) 10 MG ER tablet; Take 1 tablet (10 mg total) by mouth daily.          Time: total time spent with the patient was 25 minutes of which >50% was spent in counseling and coordination of care        Allergies   Allergen Reactions     Penicillins        Medications :  Prior to Admission medications    Medication Sig Start Date End Date Taking? Authorizing Provider   acetaminophen (TYLENOL) 650 MG CR tablet Take 650 mg by mouth every 8 (eight) hours as needed for pain. Takes 2 tablets every morning   Yes PROVIDER, HISTORICAL   alendronate (FOSAMAX) 70 MG tablet Take 1 tablet (70 mg total) by mouth every 7 days. Take in the morning on an empty stomach with a full glass of water 30 minutes before food 10/23/18  Yes Hakan Monson MD   CALCIUM CARBONATE/VITAMIN D3 (CALCIUM 500 + D, D3, ORAL) Take by mouth 2 (two) times a day.   Yes PROVIDER, HISTORICAL   docusate sodium (COLACE) 100 MG capsule Take 100 mg by mouth 2 (two) times a day.   Yes PROVIDER, HISTORICAL   ferrous gluconate (FERGON) 324 MG tablet Take 324 mg by mouth daily with breakfast.   Yes PROVIDER, HISTORICAL   multivitamin therapeutic (THERAGRAN) tablet Take 1 tablet by mouth daily.   Yes PROVIDER, HISTORICAL   omega-3 fatty acids (FISH OIL) 500 mg cap Take by mouth 2 (two) times a day.   Yes PROVIDER, HISTORICAL   omeprazole (PRILOSEC) 20 MG capsule Take 1 capsule (20 mg total) by mouth daily. 10/23/18  Yes Hakan Monson MD   oxybutynin (DITROPAN XL) 10 MG ER tablet Take 1 tablet (10 mg total) by mouth daily. 10/23/18  Yes Hakan Monson MD   potassium chloride (KLOR-CON 10) 10 MEQ CR tablet Take 1 tablet (10 mEq total) by mouth daily. 10/23/18  Yes Hakan Monson MD   sodium chloride (OCEAN) 0.65 % nasal spray 1 spray into each nostril as needed for congestion.   Yes PROVIDER, HISTORICAL   acetaminophen (TYLENOL) 325 MG tablet Take 650 mg by mouth every  6 (six) hours as needed for pain. Takes 2 tablets every morning  10/23/18 Yes PROVIDER, HISTORICAL   alendronate (FOSAMAX) 70 MG tablet TAKE 1 TABLET (70 MG TOTAL) BY MOUTH EVERY 7 DAYS. 8/5/18 10/23/18 Yes Hakan Monson MD   KLOR-CON 10 10 mEq CR tablet TAKE 1 TABLET (10 MEQ TOTAL) BY MOUTH DAILY. 10/23/17 10/23/18 Yes Wilfredo Jimenez MD   omeprazole (PRILOSEC) 20 MG capsule TAKE 1 CAPSULE (20 MG TOTAL) BY MOUTH DAILY. 6/29/18 10/23/18 Yes Hakan Monson MD   oxybutynin (DITROPAN XL) 10 MG ER tablet TAKE 1 TABLET (10 MG TOTAL) BY MOUTH DAILY. 10/8/18 10/23/18 Yes Hakan Monson MD        Past Medical History:   Past Medical History:   Diagnosis Date     Acute bronchiolitis      Arthritis      Essential hypertension     Created by Conversion  Replacement Utility updated for latest IMO load     GERD (gastroesophageal reflux disease)      Osteoporosis      Raynaud disease      Shingles        Past Surgical History: No past surgical history on file.    Social History:   Social History     Social History     Marital status:      Spouse name: N/A     Number of children: N/A     Years of education: N/A     Occupational History     Not on file.     Social History Main Topics     Smoking status: Former Smoker     Smokeless tobacco: Never Used     Alcohol use No     Drug use: Not on file     Sexual activity: Not on file     Other Topics Concern     Not on file     Social History Narrative    The patient lives by herself.        Family History: No family history on file.      Hakan Monson MD

## 2021-06-28 NOTE — PROGRESS NOTES
Lynn Soriano  is being evaluated via a billable video visit.      How would you like to obtain your AVS? Mail a copy     For the video visit, send the invitation by: Send to e-mail at: [email or web address]    Will anyone else be joining your video visit? Yes, niece will join.     Remedios CUNNINGHAM MA

## 2021-06-29 ENCOUNTER — PRE VISIT (OUTPATIENT)
Dept: ENDOCRINOLOGY | Facility: CLINIC | Age: 86
End: 2021-06-29

## 2021-06-29 ENCOUNTER — VIRTUAL VISIT (OUTPATIENT)
Dept: ENDOCRINOLOGY | Facility: CLINIC | Age: 86
End: 2021-06-29
Payer: COMMERCIAL

## 2021-06-29 DIAGNOSIS — Z78.0 POST-MENOPAUSAL: ICD-10-CM

## 2021-06-29 DIAGNOSIS — Z87.81 HISTORY OF COMPRESSION FRACTURE OF SPINE: ICD-10-CM

## 2021-06-29 DIAGNOSIS — Z92.241 HISTORY OF CORTICOSTEROID THERAPY: Primary | ICD-10-CM

## 2021-06-29 DIAGNOSIS — M81.8 OTHER OSTEOPOROSIS WITHOUT CURRENT PATHOLOGICAL FRACTURE: ICD-10-CM

## 2021-06-29 PROCEDURE — 99205 OFFICE O/P NEW HI 60 MIN: CPT | Mod: 95

## 2021-06-29 NOTE — LETTER
6/29/2021       RE: Lynn Soriano  [address]  [address]  [facility] [address]     Dear Colleague,    Thank you for referring your patient, Lynn Soriano, to the [facility] [place] at [facility]. Please see a copy of my visit note below.    Lynn Soriano  is being evaluated via a billable video visit.      How would you like to obtain your AVS? Mail a copy     For the video visit, send the invitation by: Send to e-mail at: [email or web address]    Will anyone else be joining your video visit? Yes, niece will join.     Remedios CUNNINGHAM MA      Endocrine Consult Video visit note    Attending Assessment/Plan :     Osteoporosis  with vertebral compression fractures including mild (grade 1) wedge fracture of T8, moderate (grade 2) compression fracture of T10 and L1.; vertebroplasty T10  Longstanding oral bisphosphonate recently stopped. The fracture protective effect of alendronate continues for at least 5 years after stopping the medication.  However, in her case, given the severity of the osteoporosis, would consider alternate treatment now if we saw evidence of ongoing resorption.      Due to the longstanding bisphosphonate , I  recommend checking bone turnover markers now . If the levels show reduced bone turnover, I would recommend she take a drug holiday now.  If the bone turnover is increased I would start Prolia.    I have counseled her today on calcium intake and bone exercise.  I am concerned about the elliptical type machine which has movements that we usually advise against, especially in the setting of compression fracture    Addendum: results 7/2/201 BSAP 6.6 (premenopausal 4.5-16.9 mcg/L), N telpeptide X-link 9.1 (6.2-19 nM BCE premenopausal).  The bone turnover markers show continued affect of past bisphosphonate treatment.  Recommend to hold adding additional therapy for now.  Repeat bone turnover markers adolfo year.      History  "of epidural corticosteroid- this was/ is a risk for bone     Post menopausal - this was/ is a risk for bone     Due to the COVID 19 pandemic this visit was a telephone/video visit in order to help prevent spread of infection in this high risk patient and the general population. The patient gave verbal consent for the visit today. I have independently reviewed and interpreted labs, imaging as indicated.     Chart review/prep time 1  2  Visit Start time 1800 email sent on amwel; they had problems with audio; called daughters cell [phone].   Visit Stop time  1851   70__ minutes spent on the date of the encounter doing chart review, history and exam, documentation and further activities as noted above.  Zoila Woodruff MD    Chief complaint:  Lynn is a 89 year old female seen in consultation at the request of Dr Priscilla Smith for osteoporosis. I have reviewed Care Everywhere including [facility],  [facility] lab reports, imaging reports and provider notes as indicated.      HISTORY OF PRESENT ILLNESS  Lynn presents today along with her niece Neha and duaghter Mary Jane     5/25/2021 DXA as read by me (KRYSTAL Martinez): shows scoliosis, use L2-L3 T-score -1.5; lowest T-score -3.4 right femoral neck.    VFA shows significant kyphosis and compression fractures.    3/20/2018 Vertebroplasty T10 ([facility]):   Her maximum adult height was 5'2\".  She has had a pelvis fracture with fall out of bed.    Epidural steroid 8/4/2003, 1/28/2004, 9/14/2004, 9/6/2005  No kidney stones  She had 9 babies including a set of twins.    Menopause ? In her 50's.  She never took HRT.      The 5/15/2015 office visit states she had been on alendronate x 10 years and it was discontinued at that time. It was restarted 4/19/2016.   The MAR shows alendronate Rx 70 mg/week 8/5/2018-5/20/21.  However, the 5/20/2021 note states she had likely been on bisphosphonate 15 years and that it why it was stopped at that time.      She takes  calcium /vitamin " "D3 600 bid -- she takes it with food   MVI     Her dietary calcium includes:   No milk - don't like milk -never drank milk  Ice cream sometimes , not often  Loves cheese - has it every day  Yogurt every day-- a good cup of big scoppers    We have the following labs:   10/24/19: Ca 9.7, creatinine 0.77, alk phos 40, albumin 4, Hgb 14.1, WBC 5900, platelets 186K  5/20/2021 Ca 9.2, creatinine 0.67    Heights  5/5/2015 144.1 cm 4'8.75\", 136 lbs  4/19/18 149.9 cm 4'11\", 125 lbs   10/24/19 146.7 cm 4' 9.75 \" , 123 lbs    Imaging  11/18/19 CT brain ([facility]): atrophy; no specific comment on sella or pituitary  3/4/2020 Lumbar sine: compression deformities L1, superior endplate L3, inferior endplate L4 and L5.  Chronic T10 compression with changes of vertebroplasty noted.     REVIEW OF SYSTEMS  Weight is down 3 lbs in 3 years.  Sleep is good at night; bedtime 12-2; up at 5388-1808  Cardiac: negative  Respiratory: negative  GI: negative   No back pain  + pain right leg/back junction -- intermittent  Arthritis in left side   No bone pain   Sometimes pain below the knee /right shin area  No falls  Uses walker   No dental issues; she has implants on the bottom teeth ; she has plate on the top   Wants to be sure we don't give her blood thinners; has had significant epistaxis in the past.   10 system ROS otherwise as per the HPI or negative    Past Medical History  Past Medical History:   Diagnosis Date     Anemia      Closed wedge compression fracture of T10 vertebra (H) 2018    vertebroplasty     GERD (gastroesophageal reflux disease)      Herpes zoster      History of vertebroplasty 03/10/2018    T10     Raynaud's syndrome      Senile osteoporosis 2015     Past Surgical History:   Procedure Laterality Date     IR LUMBAR EPIDURAL STEROID INJECTION  8/4/2003     IR LUMBAR EPIDURAL STEROID INJECTION  1/28/2004     IR LUMBAR EPIDURAL STEROID INJECTION  9/14/2004     IR LUMBAR EPIDURAL STEROID INJECTION  9/14/2004     IR LUMBAR " "EPIDURAL STEROID INJECTION  9/6/2005     IR LUMBAR EPIDURAL STEROID INJECTION  9/6/2005     IR THORACIC VERTEBROPLASTY  3/20/2018     VERTEBROPLASTY         Medications  Current Outpatient Medications   Medication Sig Dispense Refill     acetaminophen (TYLENOL) 325 MG tablet Take 650 mg by mouth       Calcium Carbonate-Vitamin D (CALCIUM 500 + D) 500-125 MG-UNIT TABS        docusate sodium (COLACE) 100 MG capsule Take 100 mg by mouth       ferrous gluconate (FERGON) 324 (38 Fe) MG tablet Take 324 mg by mouth       Multiple Vitamins-Minerals (MULTIVITAMIN ADULT PO) Take 1 tablet by mouth       Omega-3-acid Ethyl Esters (FISH OIL OMEGA-3 FATTY ACID) 320MG/ML oral suspension        omeprazole (PRILOSEC) 20 MG DR capsule TAKE 1 CAPSULE (20 MG TOTAL) BY MOUTH DAILY. 90 capsule 3     oxybutynin ER (DITROPAN-XL) 10 MG 24 hr tablet Take 2 tablets (20 mg) by mouth daily 180 tablet 3     potassium chloride ER (K-TAB/KLOR-CON) 10 MEQ CR tablet TAKE 1 TABLET(10 MEQ) BY MOUTH DAILY 90 tablet 3     Saline (SODIUM CHLORIDE) 0.65 % SOLN Spray 1 spray in nostril         Allergies  Allergies   Allergen Reactions     Aspirin      Penicillins        Family History  Family History   Problem Relation Age of Onset     Nephrolithiasis Sister      Osteoporosis Sister      Hip fracture No family hx of        Social History  Social History     Tobacco Use     Smoking status: Never Smoker     Smokeless tobacco: Never Used   Substance Use Topics     Alcohol use: Yes     Comment: rare     Drug use: Never     [facility]; has an \"air walker \" which looks like an elliptical - she has had it years. She uses it a couple of times/week;      Physical Exam  There were no vitals taken for this visit.  There is no height or weight on file to calculate BMI.   BP Readings from Last 1 Encounters:   05/20/21 (!) 144/72      Pulse Readings from Last 1 Encounters:   05/20/21 96      Resp Readings from Last 1 Encounters: " "  21 20      Temp Readings from Last 1 Encounters:   21 99  F (37.2  C) (Temporal)      SpO2 Readings from Last 1 Encounters:   20 95%      Wt Readings from Last 1 Encounters:   21 56.9 kg (125 lb 8 oz)      Ht Readings from Last 1 Encounters:   21 1.461 m (4' 9.5\")       GENERAL: Pleasant woman in no distress  I can see from her mid trunk up. She also demonstrated walking on the air walker and I saw her from a distance.   SKIN: Visible skin clear. No significant rash, abnormal pigmentation or lesions.  EYES: Eyes grossly normal to inspection.  No discharge or erythema, or obvious scleral/conjunctival abnormalities.  RESP: No audible wheeze, cough, or visible cyanosis.  No visible retractions or increased work of breathing.    BACK: kyphosis is apparent   NEURO: Awake, alert, responds appropriately to questions.  Mentation and speech fluent.  Mentally sharp, clear speech  PSYCH:affect normal,  appearance well-groomed.    DATA REVIEW    [place] [address]  2021      PATIENT: Lynn Soriano  CHART: [id number]   :  3/3/1929  AGE:  89 year old  SEX:  female   REFERRING PROVIDER:  Priscilla Smith MD  PROCEDURE:  Bone density scanning was performed using DXA technology of the lumbar spine and hip.  Scanning was performed on a Lunar Prodigy scanner.  Reporting is completed in the form of a T-score.  The T-score represents the standard deviation from peak bone mass based on a young healthy adult.  REFERENCE T-SCORES:       Normal                -1.0 and greater                                 Osteopenia         Between -1.0 and -2.5                                           Osteoporosis     -2.5 and less                                   RISK FACTORS:  Post-menopausal, Fracture of pelvis over 10 years ago  CURRENT TREATMENT:  Calcium, Vitamin   FINDINGS:               Lumbar Spine (L2-L3; due to significant " degenerative changes at L1 and L4, these were omitted)      T-score:  -1.5               Left Total Hip            T-score:  -3.0               Right Total Hip          T-score:  -3.4                  Lumbar (L1-L4) BMD: 1.174  Previous: n/a                  Total Hip Mean BMD: 0.608  Previous: n/a        LATERAL VERTEBRAL ASSESSMENT  Procedure:  Vertebral fracture assessment was performed in the lateral decubitus position using a Lunar Prodigy  densitometer.  Indications for VFA: T-score of -1.0 or worse and age (female>69)  Confounding factors for VFA: Arthritis/degenerative disc disease, scoliosis, rib shadows and bowel gas.  The LVA scan is interpretable from T7 to L3 (though this may be off by a level due to confounding factors)     VFA Findings: Using the semi-quantitative analysis of Kaylynn there was evidence of spinal deformity as follows:  mild (grade 1) wedge fracture of T8, moderate (grade 2) compression fracture of T10 andL1 (though again, these may be off a level due to confounding factors)  VFA Impression: Lynn Soriano has 3 vertebral fractures identified on the LVA.  If alternative etiologies for the presence of vertebral fractures are excluded, the diagnosis is consistent with severe osteoporosis.  no vertebral fractures identified on the VFA.   Further evaluation may be warranted due to presence of confounding factors      IMPRESSION  Osteoporosis; If any of the vertebral fractures is considered a fragility fracture, her diagnosis would be severe or established osteoporosis.  Degenerative changes of the spine  Recommendations include ensuring adequate daily Calcium and Vitamin D intake  Follow up scan can be considered in three years.        Current NOF guidelines recommend treatment for patients with the following:  - Prior hip or vertebral fracture  - T-score -2.5 or below  - A 10 year risk of any major osteoporotic fracture >20% or 10 year risk of hip fracture >3%, as calculated using the FRAX  calculator (www.shef.ac.uk/FRAX).       Based on these guidelines, treatment (in addition to calcium and vitamin D) is recommended for this patient, after ruling out other causes of osteoporosis/low bone density.  While this is meant as an aid to clinical decision-making, clinical judgment must still be used.         MELISSA ALLEN M.D.    Results for BRYSON GAMBOA (MRN [id number]) as of 6/29/2021 12:05   Ref. Range 5/20/2021 15:17   Sodium Latest Ref Range: 133 - 144 mmol/L 138   Potassium Latest Ref Range: 3.4 - 5.3 mmol/L 3.9   Chloride Latest Ref Range: 94 - 109 mmol/L 106   Carbon Dioxide Latest Ref Range: 20 - 32 mmol/L 26   Urea Nitrogen Latest Ref Range: 7 - 30 mg/dL 24   Creatinine Latest Ref Range: 0.52 - 1.04 mg/dL 0.67   GFR Estimate Latest Ref Range: >60 mL/min/1.73_m2 76   GFR Estimate If Black Latest Ref Range: >60 mL/min/1.73_m2 88   Calcium Latest Ref Range: 8.5 - 10.1 mg/dL 9.2   Anion Gap Latest Ref Range: 3 - 14 mmol/L 6   Glucose Latest Ref Range: 70 - 99 mg/dL 91     Result Narrative   EXAM: XR SPINE LUMBAR 3 VIEWS  LOCATION: [facility] [place]  DATE/TIME: 3/4/2020 3:56 PM    INDICATION: Back Pain  COMPARISON: MRI lumbar spine 03/19/2018 and lumbar spine radiographs 03/13/2018    IMPRESSION: Diffuse bone demineralization significantly limits detailed  evaluation. There are 5 nonrib-bearing lumbar type vertebral bodies. Grossly  stable chronic compression fracture deformities of the L1 vertebral body,  superior L3 endplate, and inferior endplates at L4 and L5. Evaluation for  interval worsening is limited by diffuse bone demineralization. If there is  persistent clinical concern for acute fracture, recommend further evaluation  with lumbar spine CT. Chronic T10 vertebral body compression fracture with  changes of vertebroplasty noted. Broad levocurvature lumbar spine, apex at  L3-L4. Mild to moderate interbody degenerative change throughout the lumbar  spine. Moderate to advanced degenerative  facet arthropathy at L2-L3 through  L5-S1. Moderate to advanced degenerative change in the bilateral sacroiliac  joints. Osteitis pubis. Atherosclerotic vascular calcifications.

## 2021-06-29 NOTE — PATIENT INSTRUCTIONS
Check if your insurance will cover the 2 bone turnover marker tests:  Bone specific alkaline phosphatase  N telopeptide serum      CALCIUM Recommendation 1500 mg/day in divided dose of no more than 500 mg/dose. This includes what is in your food and also what is in any supplements you are taking.      Dietary sources of calcium: These also contain vitamin D  Milk / buttermilk              8 oz            300 mg  Dry milk powder       5 Tbsp             300 mg  Yogurt                          1 cup           400 mg  Ice cream   1/2 cup          100 mg  Hard cheese                     1.5 oz          300 mg  Cottage cheese                1/2 cup        65 mg  Spinach, cooked  1 cup  240 mg  Tofu, soft regular           4 oz          120 to 390 mg  Sardines                       3 oz               370 mg  Mackerel canned         3 oz                250 mg  Canned salmon with bones 3 oz        170-210 mg  Calcium fortified cereals are available  SILK soy and almond milk products are calcium fortified  Orange juice  Fortified with Calcium       8 oz            300 mg  Low fat dairy sources are recommended      Recommended exercise goals:    30 minutes of moderate exercise on most days of the week .  Weight bearing exercise (ie, walking, jogging, hiking, dancing)    Strength training 2 or more times/week in addition to other weight -being exercise.  Strength training -- uses weight or resistance beyond that seen in everyday activities -(pilates, weight training with free weights, weight machines or resistance bands)    OSTEOPOROSIS of the spine: avoid exercise machines that incorporate spinal flexion, trunk rotation, or forward bending   Specifically avoid abdominal exercisers, stationary bikes with moving handles, cross-country ski machines, rowing machines, and bicep curl machines  Spinal fractures: AVOID activities that place significant force on the spine such as horse back riding,  tennis, golf or bowling

## 2021-06-29 NOTE — PROGRESS NOTES
Endocrine Consult Video visit note    Attending Assessment/Plan :     Osteoporosis  with vertebral compression fractures including mild (grade 1) wedge fracture of T8, moderate (grade 2) compression fracture of T10 and L1.; vertebroplasty T10  Longstanding oral bisphosphonate recently stopped. The fracture protective effect of alendronate continues for at least 5 years after stopping the medication.  However, in her case, given the severity of the osteoporosis, would consider alternate treatment now if we saw evidence of ongoing resorption.      Due to the longstanding bisphosphonate , I  recommend checking bone turnover markers now . If the levels show reduced bone turnover, I would recommend she take a drug holiday now.  If the bone turnover is increased I would start Prolia.    I have counseled her today on calcium intake and bone exercise.  I am concerned about the elliptical type machine which has movements that we usually advise against, especially in the setting of compression fracture    Addendum: results 7/2/201 BSAP 6.6 (premenopausal 4.5-16.9 mcg/L), N telpeptide X-link 9.1 (6.2-19 nM BCE premenopausal).  The bone turnover markers show continued affect of past bisphosphonate treatment.  Recommend to hold adding additional therapy for now.  Repeat bone turnover markers adolfo year.      History of epidural corticosteroid- this was/ is a risk for bone     Post menopausal - this was/ is a risk for bone     Due to the COVID 19 pandemic this visit was a telephone/video visit in order to help prevent spread of infection in this high risk patient and the general population. The patient gave verbal consent for the visit today. I have independently reviewed and interpreted labs, imaging as indicated.     Chart review/prep time 1  2  Visit Start time 1800 email sent on amwel; they had problems with audio; called daughters cell [phone].   Visit Stop time  1851   70__ minutes spent on the date of the encounter doing  "chart review, history and exam, documentation and further activities as noted above.  Zoila Woodruff MD    Chief complaint:  Lynn is a 89 year old female seen in consultation at the request of Dr Priscilla Smith for osteoporosis. I have reviewed Care Everywhere including [facility] lab reports, imaging reports and provider notes as indicated.      HISTORY OF PRESENT ILLNESS  Lynn presents today along with her niece Neha and duaghter Mary Jane     5/25/2021 DXA as read by me ([facility]): shows scoliosis, use L2-L3 T-score -1.5; lowest T-score -3.4 right femoral neck.    VFA shows significant kyphosis and compression fractures.    3/20/2018 Vertebroplasty T10 ([facility]):   Her maximum adult height was 5'2\".  She has had a pelvis fracture with fall out of bed.    Epidural steroid 8/4/2003, 1/28/2004, 9/14/2004, 9/6/2005  No kidney stones  She had 9 babies including a set of twins.    Menopause ? In her 50's.  She never took HRT.      The 5/15/2015 office visit states she had been on alendronate x 10 years and it was discontinued at that time. It was restarted 4/19/2016.   The MAR shows alendronate Rx 70 mg/week 8/5/2018-5/20/21.  However, the 5/20/2021 note states she had likely been on bisphosphonate 15 years and that it why it was stopped at that time.      She takes  calcium /vitamin D3 600 bid -- she takes it with food   MVI     Her dietary calcium includes:   No milk - don't like milk -never drank milk  Ice cream sometimes , not often  Loves cheese - has it every day  Yogurt every day-- a good cup of big scoppers    We have the following labs:   10/24/19: Ca 9.7, creatinine 0.77, alk phos 40, albumin 4, Hgb 14.1, WBC 5900, platelets 186K  5/20/2021 Ca 9.2, creatinine 0.67    Heights  5/5/2015 144.1 cm 4'8.75\", 136 lbs  4/19/18 149.9 cm 4'11\", 125 lbs   10/24/19 146.7 cm 4' 9.75 \" , 123 lbs    Imaging  11/18/19 CT brain ([facility]): atrophy; no specific comment on sella or pituitary  3/4/2020 Lumbar " sine: compression deformities L1, superior endplate L3, inferior endplate L4 and L5.  Chronic T10 compression with changes of vertebroplasty noted.     REVIEW OF SYSTEMS  Weight is down 3 lbs in 3 years.  Sleep is good at night; bedtime 12-2; up at 5662-5836  Cardiac: negative  Respiratory: negative  GI: negative   No back pain  + pain right leg/back junction -- intermittent  Arthritis in left side   No bone pain   Sometimes pain below the knee /right shin area  No falls  Uses walker   No dental issues; she has implants on the bottom teeth ; she has plate on the top   Wants to be sure we don't give her blood thinners; has had significant epistaxis in the past.   10 system ROS otherwise as per the HPI or negative    Past Medical History  Past Medical History:   Diagnosis Date     Anemia      Closed wedge compression fracture of T10 vertebra (H) 2018    vertebroplasty     GERD (gastroesophageal reflux disease)      Herpes zoster      History of vertebroplasty 03/10/2018    T10     Raynaud's syndrome      Senile osteoporosis 2015     Past Surgical History:   Procedure Laterality Date     IR LUMBAR EPIDURAL STEROID INJECTION  8/4/2003     IR LUMBAR EPIDURAL STEROID INJECTION  1/28/2004     IR LUMBAR EPIDURAL STEROID INJECTION  9/14/2004     IR LUMBAR EPIDURAL STEROID INJECTION  9/14/2004     IR LUMBAR EPIDURAL STEROID INJECTION  9/6/2005     IR LUMBAR EPIDURAL STEROID INJECTION  9/6/2005     IR THORACIC VERTEBROPLASTY  3/20/2018     VERTEBROPLASTY         Medications  Current Outpatient Medications   Medication Sig Dispense Refill     acetaminophen (TYLENOL) 325 MG tablet Take 650 mg by mouth       Calcium Carbonate-Vitamin D (CALCIUM 500 + D) 500-125 MG-UNIT TABS        docusate sodium (COLACE) 100 MG capsule Take 100 mg by mouth       ferrous gluconate (FERGON) 324 (38 Fe) MG tablet Take 324 mg by mouth       Multiple Vitamins-Minerals (MULTIVITAMIN ADULT PO) Take 1 tablet by mouth       Omega-3-acid Ethyl Esters  "(FISH OIL OMEGA-3 FATTY ACID) 320MG/ML oral suspension        omeprazole (PRILOSEC) 20 MG DR capsule TAKE 1 CAPSULE (20 MG TOTAL) BY MOUTH DAILY. 90 capsule 3     oxybutynin ER (DITROPAN-XL) 10 MG 24 hr tablet Take 2 tablets (20 mg) by mouth daily 180 tablet 3     potassium chloride ER (K-TAB/KLOR-CON) 10 MEQ CR tablet TAKE 1 TABLET(10 MEQ) BY MOUTH DAILY 90 tablet 3     Saline (SODIUM CHLORIDE) 0.65 % SOLN Spray 1 spray in nostril         Allergies  Allergies   Allergen Reactions     Aspirin      Penicillins        Family History  Family History   Problem Relation Age of Onset     Nephrolithiasis Sister      Osteoporosis Sister      Hip fracture No family hx of        Social History  Social History     Tobacco Use     Smoking status: Never Smoker     Smokeless tobacco: Never Used   Substance Use Topics     Alcohol use: Yes     Comment: rare     Drug use: Never     [facility]; has an \"air walker \" which looks like an elliptical - she has had it years. She uses it a couple of times/week;      Physical Exam  There were no vitals taken for this visit.  There is no height or weight on file to calculate BMI.   BP Readings from Last 1 Encounters:   05/20/21 (!) 144/72      Pulse Readings from Last 1 Encounters:   05/20/21 96      Resp Readings from Last 1 Encounters:   05/20/21 20      Temp Readings from Last 1 Encounters:   05/20/21 99  F (37.2  C) (Temporal)      SpO2 Readings from Last 1 Encounters:   03/09/20 95%      Wt Readings from Last 1 Encounters:   05/20/21 56.9 kg (125 lb 8 oz)      Ht Readings from Last 1 Encounters:   05/20/21 1.461 m (4' 9.5\")       GENERAL: Pleasant woman in no distress  I can see from her mid trunk up. She also demonstrated walking on the air walker and I saw her from a distance.   SKIN: Visible skin clear. No significant rash, abnormal pigmentation or lesions.  EYES: Eyes grossly normal to inspection.  No discharge or erythema, or obvious " scleral/conjunctival abnormalities.  RESP: No audible wheeze, cough, or visible cyanosis.  No visible retractions or increased work of breathing.    BACK: kyphosis is apparent   NEURO: Awake, alert, responds appropriately to questions.  Mentation and speech fluent.  Mentally sharp, clear speech  PSYCH:affect normal,  appearance well-groomed.    DATA REVIEW    BONE DENSITOMETRY  [address] [address]  2021      PATIENT: Lynn Soriano  CHART: [id number]   :  3/3/1929  AGE:  89 year old  SEX:  female   REFERRING PROVIDER:  Priscilla Smith MD  PROCEDURE:  Bone density scanning was performed using DXA technology of the lumbar spine and hip.  Scanning was performed on a Lunar Prodigy scanner.  Reporting is completed in the form of a T-score.  The T-score represents the standard deviation from peak bone mass based on a young healthy adult.  REFERENCE T-SCORES:       Normal                -1.0 and greater                                 Osteopenia         Between -1.0 and -2.5                                           Osteoporosis     -2.5 and less                                   RISK FACTORS:  Post-menopausal, Fracture of pelvis over 10 years ago  CURRENT TREATMENT:  Calcium, Vitamin   FINDINGS:               Lumbar Spine (L2-L3; due to significant degenerative changes at L1 and L4, these were omitted)      T-score:  -1.5               Left Total Hip            T-score:  -3.0               Right Total Hip          T-score:  -3.4                  Lumbar (L1-L4) BMD: 1.174  Previous: n/a                  Total Hip Mean BMD: 0.608  Previous: n/a        LATERAL VERTEBRAL ASSESSMENT  Procedure:  Vertebral fracture assessment was performed in the lateral decubitus position using a Lunar Prodigy  densitometer.  Indications for VFA: T-score of -1.0 or worse and age (female>69)  Confounding factors for VFA: Arthritis/degenerative disc disease, scoliosis, rib shadows  and bowel gas.  The LVA scan is interpretable from T7 to L3 (though this may be off by a level due to confounding factors)     VFA Findings: Using the semi-quantitative analysis of [employer] there was evidence of spinal deformity as follows:  mild (grade 1) wedge fracture of T8, moderate (grade 2) compression fracture of T10 andL1 (though again, these may be off a level due to confounding factors)  VFA Impression: Bryson Soriano has 3 vertebral fractures identified on the LVA.  If alternative etiologies for the presence of vertebral fractures are excluded, the diagnosis is consistent with severe osteoporosis.  no vertebral fractures identified on the VFA.   Further evaluation may be warranted due to presence of confounding factors      IMPRESSION  Osteoporosis; If any of the vertebral fractures is considered a fragility fracture, her diagnosis would be severe or established osteoporosis.  Degenerative changes of the spine  Recommendations include ensuring adequate daily Calcium and Vitamin D intake  Follow up scan can be considered in three years.        Current NOF guidelines recommend treatment for patients with the following:  - Prior hip or vertebral fracture  - T-score -2.5 or below  - A 10 year risk of any major osteoporotic fracture >20% or 10 year risk of hip fracture >3%, as calculated using the FRAX calculator (www.shef.ac.uk/FRAX).       Based on these guidelines, treatment (in addition to calcium and vitamin D) is recommended for this patient, after ruling out other causes of osteoporosis/low bone density.  While this is meant as an aid to clinical decision-making, clinical judgment must still be used.         MELISSA ALLEN M.D.    Results for BRYSON SORIANO (MRN [id number]) as of 6/29/2021 12:05   Ref. Range 5/20/2021 15:17   Sodium Latest Ref Range: 133 - 144 mmol/L 138   Potassium Latest Ref Range: 3.4 - 5.3 mmol/L 3.9   Chloride Latest Ref Range: 94 - 109 mmol/L 106   Carbon Dioxide Latest Ref Range: 20 -  32 mmol/L 26   Urea Nitrogen Latest Ref Range: 7 - 30 mg/dL 24   Creatinine Latest Ref Range: 0.52 - 1.04 mg/dL 0.67   GFR Estimate Latest Ref Range: >60 mL/min/1.73_m2 76   GFR Estimate If Black Latest Ref Range: >60 mL/min/1.73_m2 88   Calcium Latest Ref Range: 8.5 - 10.1 mg/dL 9.2   Anion Gap Latest Ref Range: 3 - 14 mmol/L 6   Glucose Latest Ref Range: 70 - 99 mg/dL 91     Result Narrative   EXAM: XR SPINE LUMBAR 3 VIEWS  LOCATION: [facility]  DATE/TIME: 3/4/2020 3:56 PM    INDICATION: Back Pain  COMPARISON: MRI lumbar spine 03/19/2018 and lumbar spine radiographs 03/13/2018    IMPRESSION: Diffuse bone demineralization significantly limits detailed  evaluation. There are 5 nonrib-bearing lumbar type vertebral bodies. Grossly  stable chronic compression fracture deformities of the L1 vertebral body,  superior L3 endplate, and inferior endplates at L4 and L5. Evaluation for  interval worsening is limited by diffuse bone demineralization. If there is  persistent clinical concern for acute fracture, recommend further evaluation  with lumbar spine CT. Chronic T10 vertebral body compression fracture with  changes of vertebroplasty noted. Broad levocurvature lumbar spine, apex at  L3-L4. Mild to moderate interbody degenerative change throughout the lumbar  spine. Moderate to advanced degenerative facet arthropathy at L2-L3 through  L5-S1. Moderate to advanced degenerative change in the bilateral sacroiliac  joints. Osteitis pubis. Atherosclerotic vascular calcifications.

## 2021-07-02 DIAGNOSIS — Z92.241 HISTORY OF CORTICOSTEROID THERAPY: ICD-10-CM

## 2021-07-02 DIAGNOSIS — Z78.0 POST-MENOPAUSAL: ICD-10-CM

## 2021-07-02 DIAGNOSIS — Z87.81 HISTORY OF COMPRESSION FRACTURE OF SPINE: ICD-10-CM

## 2021-07-02 DIAGNOSIS — M81.8 OTHER OSTEOPOROSIS WITHOUT CURRENT PATHOLOGICAL FRACTURE: ICD-10-CM

## 2021-07-02 PROCEDURE — 82523 COLLAGEN CROSSLINKS: CPT | Mod: 90

## 2021-07-02 PROCEDURE — 36415 COLL VENOUS BLD VENIPUNCTURE: CPT

## 2021-07-02 PROCEDURE — 99000 SPECIMEN HANDLING OFFICE-LAB: CPT

## 2021-07-02 PROCEDURE — 84080 ASSAY ALKALINE PHOSPHATASES: CPT | Mod: 90

## 2021-07-03 LAB — ALP BONE SERPL-MCNC: 6.6 UG/L

## 2021-07-03 NOTE — ADDENDUM NOTE
Addendum Note by Jai Kinsey MD at 3/19/2018  2:36 PM     Author: Jai Kinsey MD Service: -- Author Type: Physician    Filed: 3/19/2018  2:36 PM Encounter Date: 3/19/2018 Status: Signed    : Jai Kinsey MD (Physician)    Addended by: JAI KINSEY on: 3/19/2018 02:36 PM        Modules accepted: Orders

## 2021-07-08 LAB — COLLAGEN NTX SER-SCNC: 9.1 NM BCE

## 2021-10-10 ENCOUNTER — HEALTH MAINTENANCE LETTER (OUTPATIENT)
Age: 86
End: 2021-10-10

## 2022-01-01 ENCOUNTER — TRANSFERRED RECORDS (OUTPATIENT)
Dept: HEALTH INFORMATION MANAGEMENT | Facility: CLINIC | Age: 87
End: 2022-01-01

## 2022-01-01 ENCOUNTER — MYC MEDICAL ADVICE (OUTPATIENT)
Dept: PEDIATRICS | Facility: CLINIC | Age: 87
End: 2022-01-01
Payer: COMMERCIAL

## 2022-01-01 ENCOUNTER — MYC MEDICAL ADVICE (OUTPATIENT)
Dept: PEDIATRICS | Facility: CLINIC | Age: 87
End: 2022-01-01

## 2022-01-01 ENCOUNTER — OFFICE VISIT (OUTPATIENT)
Dept: PEDIATRICS | Facility: CLINIC | Age: 87
End: 2022-01-01
Payer: COMMERCIAL

## 2022-01-01 VITALS
TEMPERATURE: 97.5 F | HEART RATE: 85 BPM | DIASTOLIC BLOOD PRESSURE: 64 MMHG | SYSTOLIC BLOOD PRESSURE: 140 MMHG | RESPIRATION RATE: 16 BRPM | WEIGHT: 127.1 LBS | OXYGEN SATURATION: 99 % | BODY MASS INDEX: 25.67 KG/M2

## 2022-01-01 DIAGNOSIS — I73.00 RAYNAUD'S DISEASE WITHOUT GANGRENE: Primary | ICD-10-CM

## 2022-01-01 DIAGNOSIS — R30.0 DYSURIA: Primary | ICD-10-CM

## 2022-01-01 LAB
ALBUMIN UR-MCNC: NEGATIVE MG/DL
APPEARANCE UR: CLEAR
BACTERIA #/AREA URNS HPF: ABNORMAL /HPF
BILIRUB UR QL STRIP: NEGATIVE
COLOR UR AUTO: YELLOW
GLUCOSE UR STRIP-MCNC: NEGATIVE MG/DL
HGB UR QL STRIP: NEGATIVE
KETONES UR STRIP-MCNC: ABNORMAL MG/DL
LEUKOCYTE ESTERASE UR QL STRIP: NEGATIVE
MUCOUS THREADS #/AREA URNS LPF: PRESENT /LPF
NITRATE UR QL: NEGATIVE
PH UR STRIP: 5 [PH] (ref 5–7)
RBC #/AREA URNS AUTO: ABNORMAL /HPF
SP GR UR STRIP: >=1.03 (ref 1–1.03)
SQUAMOUS #/AREA URNS AUTO: ABNORMAL /LPF
UROBILINOGEN UR STRIP-ACNC: 0.2 E.U./DL
WBC #/AREA URNS AUTO: ABNORMAL /HPF

## 2022-01-01 PROCEDURE — 99213 OFFICE O/P EST LOW 20 MIN: CPT | Mod: GE | Performed by: STUDENT IN AN ORGANIZED HEALTH CARE EDUCATION/TRAINING PROGRAM

## 2022-01-01 PROCEDURE — 81001 URINALYSIS AUTO W/SCOPE: CPT | Performed by: STUDENT IN AN ORGANIZED HEALTH CARE EDUCATION/TRAINING PROGRAM

## 2022-01-01 ASSESSMENT — PAIN SCALES - GENERAL: PAINLEVEL: NO PAIN (0)

## 2022-01-07 ENCOUNTER — OFFICE VISIT (OUTPATIENT)
Dept: URGENT CARE | Facility: URGENT CARE | Age: 87
End: 2022-01-07
Payer: COMMERCIAL

## 2022-01-07 ENCOUNTER — ANCILLARY PROCEDURE (OUTPATIENT)
Dept: GENERAL RADIOLOGY | Facility: CLINIC | Age: 87
End: 2022-01-07
Attending: PHYSICIAN ASSISTANT
Payer: COMMERCIAL

## 2022-01-07 VITALS
WEIGHT: 132 LBS | RESPIRATION RATE: 16 BRPM | BODY MASS INDEX: 28.07 KG/M2 | OXYGEN SATURATION: 96 % | DIASTOLIC BLOOD PRESSURE: 64 MMHG | SYSTOLIC BLOOD PRESSURE: 130 MMHG | TEMPERATURE: 99.2 F | HEART RATE: 99 BPM

## 2022-01-07 DIAGNOSIS — M25.561 ACUTE PAIN OF RIGHT KNEE: ICD-10-CM

## 2022-01-07 DIAGNOSIS — M79.661 PAIN IN RIGHT LOWER LEG: Primary | ICD-10-CM

## 2022-01-07 DIAGNOSIS — S89.91XA LEG INJURY, RIGHT, INITIAL ENCOUNTER: ICD-10-CM

## 2022-01-07 PROCEDURE — 99214 OFFICE O/P EST MOD 30 MIN: CPT | Performed by: PHYSICIAN ASSISTANT

## 2022-01-07 PROCEDURE — 73590 X-RAY EXAM OF LOWER LEG: CPT | Mod: RT | Performed by: RADIOLOGY

## 2022-01-07 PROCEDURE — 73562 X-RAY EXAM OF KNEE 3: CPT | Mod: RT | Performed by: RADIOLOGY

## 2022-01-07 ASSESSMENT — PAIN SCALES - GENERAL: PAINLEVEL: MODERATE PAIN (4)

## 2022-01-07 NOTE — PATIENT INSTRUCTIONS
Patient Education     Knee Sprain    A sprain is an injury to the ligaments or capsule that holds a joint together. There are no broken bones. Most sprains take 3 to 6 weeks to heal. If it a severe sprain where the ligament is completely torn, it can take months to recover.  Most knee sprains are treated with a splint, knee immobilizer brace, or elastic wrap for support. Severe sprains may rarely require surgery.  Home care    Stay off the injured leg as much as possible until you can walk on it without pain. If you have a lot of pain with walking, crutches or a walker may be prescribed. (These can be rented or purchased at many pharmacies and surgical or orthopedic supply stores). Follow your healthcare provider's advice about when to begin putting weight on that leg.    Keep your leg elevated to reduce pain and swelling. When sleeping, place a pillow under the injured leg. When sitting, support the injured leg so it is above heart level. This is very important during the first 48 hours.    Apply an ice pack over the injured area for 15 to 20 minutes every 3 to 6 hours. You should do this for the first 24 to 48 hours. You can make an ice pack by filling a plastic bag that seals at the top with ice cubes and then wrapping it with a thin towel. Continue to use ice packs for relief of pain and swelling as needed. As the ice melts, be careful to avoid getting your wrap, splint, or cast wet. After 48 hours, apply heat (warm shower or warm bath) for 15 to 20 minutes several times a day, or alternate ice and heat. You can place the ice pack directly over the splint. If you have to wear a hook-and-loop knee brace, you can open it to apply the ice pack, or heat, directly to the knee. Never put ice directly on the skin. Always wrap the ice in a towel or other type of cloth.    You may use over-the-counter pain medicine to control pain, unless another pain medicine was prescribed. If you have chronic liver or kidney disease  or ever had a stomach ulcer or gastrointestinal bleeding, talk with your healthcare provider before using these medicines.    If you were given a splint, keep it completely dry at all times. Bathe with your splint out of the water, protected with 2 large plastic bags, sealed with rubber bands or tape at the top end. If a fiberglass splint gets wet, you can dry it with a hair dryer set to cool. If you have a hook-and-loop knee brace, you can remove this to bathe, unless told otherwise.  Follow-up care  Follow up with your doctor as advised. Any X-rays you had today don t show any broken bones, breaks, or fractures. Sometimes fractures don t show up on the first X-ray. Bruises and sprains can sometimes hurt as much as a fracture. These injuries can take time to heal completely. If your symptoms don t improve or they get worse, talk with your doctor. You may need a repeat X-ray. If X-rays were taken, you will be told of any new findings that may affect your care.  Call 911  Call 911 if you have:     Shortness of breath     Chest pain  When to seek medical advice  Call your healthcare provider right away if any of these occur:    The splint or knee immobilizer brace becomes wet or soft    The fiberglass cast or splint remains wet for more than 24 hours    Pain or swelling increases    The injured leg or toes become cold, blue, numb, or tingly  [employer]if last reviewed this educational content on 5/1/2018 2000-2021 [employer]. All rights reserved. This information is not intended as a substitute for professional medical care. Always follow your healthcare professional's instructions.

## 2022-01-08 NOTE — PROGRESS NOTES
Assessment & Plan     1. Pain in right lower leg  - XR Knee Right 3 Views; Future  - XR Tibia & Fibula Right 2 Views; Future  - Orthopedic  Referral; Future    2. Acute pain of right knee    89-year-old female presenting to the clinic for evaluation after a fall.  On exam she does have bruising and some swelling of her left lower extremity.  She is neurovascularly intact.  She does not have point tenderness.  She has full range of motion in her ankle and knee.  X-rays are negative per my read.  I advised elevation and ice of the area.  I would also like her to rest the area for 1 week, reduce the amount of walking she does daily.  Her family has some concerns about her mobility, and ability to do activities of daily living.  She lives alone, but in a facility where she would be able to receive services.  She has life alert, but after this last fall she forgot that she had it, and did not use it to call.  I advised to reach out about nursing services to help her prepare meals, cook, change close, toilet etc.   Additionally advised following up with orthopedics next week for recheck if she is still in pain.        Return in about 3 days (around 1/10/2022), or if symptoms worsen or fail to improve fu with Ortho.    Diagnosis and treatment plan was reviewed with patient and/or family.   We went over any labs or imaging. Discussed worsening symptoms or little to no relief despite treatment plan to follow-up with PCP or return to clinic.  Patient verbalizes understanding. All questions were addressed and answered.     Amna Torres PA-C  [facility]    CHIEF COMPLAINT:   Chief Complaint   Patient presents with     Fall     Patient injured right knee, falling; fell on Wednesday. When patient walks it feels like her knee will give out, painful, and her other leg is just as bad. There is significant bruising from knee to calf, edema as well.      Subjective     Lynn is a 89 year old  female who presents to clinic today for evaluation of right leg pain and swelling.  The injury occurred 1 day(s) ago.  Yesterday morning around 9 AM, patient was sitting on the chair zipping up her boots, lost her balance and fell.  She was unable to get up, crawled to her door and EMS was called to assist her in getting up.  She typically uses a walker, and feels pain with walking.  Does feel a bit unsteady on her feet.  Symptoms have improved since yesterday.  She has been taking Tylenol for symptoms.  She denies having numbness or tingling into her toes.        Past Medical History:   Diagnosis Date     Anemia      Closed wedge compression fracture of T10 vertebra (H) 2018    vertebroplasty     GERD (gastroesophageal reflux disease)      Herpes zoster      History of vertebroplasty 03/10/2018    T10     Raynaud's syndrome      Recurrent epistaxis      Senile osteoporosis 2015     Past Surgical History:   Procedure Laterality Date     IR LUMBAR EPIDURAL STEROID INJECTION  8/4/2003     IR LUMBAR EPIDURAL STEROID INJECTION  1/28/2004     IR LUMBAR EPIDURAL STEROID INJECTION  9/14/2004     IR LUMBAR EPIDURAL STEROID INJECTION  9/14/2004     IR LUMBAR EPIDURAL STEROID INJECTION  9/6/2005     IR LUMBAR EPIDURAL STEROID INJECTION  9/6/2005     IR THORACIC VERTEBROPLASTY  3/20/2018     VERTEBROPLASTY       Social History     Tobacco Use     Smoking status: Never Smoker     Smokeless tobacco: Never Used   Substance Use Topics     Alcohol use: Yes     Comment: rare     Current Outpatient Medications   Medication     acetaminophen (TYLENOL) 325 MG tablet     Calcium Carbonate-Vitamin D (CALCIUM 500 + D) 500-125 MG-UNIT TABS     docusate sodium (COLACE) 100 MG capsule     ferrous gluconate (FERGON) 324 (38 Fe) MG tablet     Multiple Vitamins-Minerals (MULTIVITAMIN ADULT PO)     Omega-3-acid Ethyl Esters (FISH OIL OMEGA-3 FATTY ACID) 320MG/ML oral suspension     omeprazole (PRILOSEC) 20 MG DR capsule     oxybutynin ER  (DITROPAN-XL) 10 MG 24 hr tablet     potassium chloride ER (K-TAB/KLOR-CON) 10 MEQ CR tablet     Saline (SODIUM CHLORIDE) 0.65 % SOLN     No current facility-administered medications for this visit.     Allergies   Allergen Reactions     Aspirin      Penicillins        10 point ROS of systems were all negative except for pertinent positives noted in my HPI.      Exam:   /64   Pulse 99   Temp 99.2  F (37.3  C) (Tympanic)   Resp 16   Wt 59.9 kg (132 lb)   SpO2 96%   BMI 28.07 kg/m    Gen: healthy, alert, no distress and healthy,alert,no distress  Extremity: Right lower extremity has bruising and tenderness at proximal tibia and the fibula area.  She has full range of motion in her knee and ankle.  She does not have point tenderness.  Bruising noted.   There is not compromise to the distal circulation.  Pulses are +2 and CRT is brisk  GENERAL APPEARANCE: healthy, alert and no distress  EXTREMITIES: peripheral pulses normal  SKIN: no suspicious lesions or rashes  NEURO: Normal strength and tone, sensory exam grossly normal, mentation intact and speech normal    Results for orders placed or performed in visit on 01/07/22   XR Knee Right 3 Views     Status: None    Narrative    RIGHT KNEE THREE VIEWS  1/7/2022 12:26 PM     HISTORY: Leg injury, right, initial encounter.    COMPARISON: None available.      Impression    IMPRESSION:  Diffuse bone demineralization. Large right knee joint  effusion. 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 No acute displaced  right knee fracture. No significant anterior right knee soft tissue  swelling. Vascular calcification.    DEL CARRILLO MD         SYSTEM ID:  [id number]   Results for orders placed or performed in visit on 01/07/22   XR Tibia & Fibula Right 2 Views     Status: None    Narrative    TIBIA AND FIBULA RIGHT TWO VIEW  1/7/2022 12:25 PM     HISTORY: Fell. Pain in right lower leg. Leg injury,  right, initial  encounter.    COMPARISON: None available.      Impression    IMPRESSION:  Anatomic alignment right tibia and fibula. No acute displaced right  tibia or fibula fracture. Degenerative arthrosis right knee with  chondrocalcinosis. Vascular and soft tissue calcification. Generalized  right leg soft tissue swelling with moderate bimalleolar ankle soft  tissue swelling.    DEL CARRILLO MD         SYSTEM ID:  [id number]

## 2022-01-11 ENCOUNTER — NURSE TRIAGE (OUTPATIENT)
Dept: PEDIATRICS | Facility: CLINIC | Age: 87
End: 2022-01-11
Payer: COMMERCIAL

## 2022-01-11 NOTE — TELEPHONE ENCOUNTER
Symptoms    Describe your symptoms: Patient's niece called in (no CTC on file but got verbal OK from pt to speak with her) to notify PCP that patient went to  on 01/07/2021 for right leg injury. Niece is concerned for possible blood clots since leg is still swollen but unsure if pt needs to be seen since the  provider did not have any concerns for blood clots.     How long have you been having symptoms: 4 days ago     Have you been seen for this:  Yes: urgent care on 01/07/2021    Appointment offered?: No.  Provider placed ortho referral and is scheduled to be seen 01/13/22.    Triage offered?: Yes: but niece wants to know whether patient should be seen soon or not.     Okay to leave a detailed message? Yes at Other phone number:  [phone]

## 2022-01-11 NOTE — TELEPHONE ENCOUNTER
"Huddled with Dr. Woodruff, patient should be seen in the urgency room for a blood clot rule out.     Called patient's niece and requested she have someone take patient to the urgency room in [place]. She agree's to plan. If patient develops SOB or chest pain they will call 911.     Reason for Disposition    Thigh, calf, or ankle swelling in both legs, but one side is definitely more swollen (Exception: longstanding difference between legs)    Answer Assessment - Initial Assessment Questions  1. ONSET: \"When did the swelling start?\" (e.g., minutes, hours, days)      Started last Friday. Swelling increased on Sunday.   2. LOCATION: \"What part of the leg is swollen?\"  \"Are both legs swollen or just one leg?\"      Swelling is worse then right but in both legs. Right ankle was the one that was inured. Calf, ankle. Feet are not swollen much.   3. SEVERITY: \"How bad is the swelling?\" (e.g., localized; mild, moderate, severe)   - Localized - small area of swelling localized to one leg   - MILD pedal edema - swelling limited to foot and ankle, pitting edema < 1/4 inch (6 mm) deep, rest and elevation eliminate most or all swelling   - MODERATE edema - swelling of lower leg to knee, pitting edema > 1/4 inch (6 mm) deep, rest and elevation only partially reduce swelling   - SEVERE edema - swelling extends above knee, facial or hand swelling present       Moderate swelling  4. REDNESS: \"Does the swelling look red or infected?\"      No redness.   5. PAIN: \"Is the swelling painful to touch?\" If so, ask: \"How painful is it?\"   (Scale 1-10; mild, moderate or severe)      No pain but it is painful to the touch.   6. FEVER: \"Do you have a fever?\" If so, ask: \"What is it, how was it measured, and when did it start?\"       No   7. CAUSE: \"What do you think is causing the leg swelling?\"      Concerned about a blood clot.   8. MEDICAL HISTORY: \"Do you have a history of heart failure, kidney disease, liver failure, or cancer?\"      No   9. " "RECURRENT SYMPTOM: \"Have you had leg swelling before?\" If so, ask: \"When was the last time?\" \"What happened that time?\"      With pregnancy and some ankle edema.   10. OTHER SYMPTOMS: \"Do you have any other symptoms?\" (e.g., chest pain, difficulty breathing)        none  11. PREGNANCY: \"Is there any chance you are pregnant?\" \"When was your last menstrual period?\"        n/a    Protocols used: LEG SWELLING AND EDEMA-A-OH    Brisa Rollins RN on 1/11/2022 at 2:17 PM    "

## 2022-01-13 ENCOUNTER — OFFICE VISIT (OUTPATIENT)
Dept: ORTHOPEDICS | Facility: CLINIC | Age: 87
End: 2022-01-13
Attending: PHYSICIAN ASSISTANT
Payer: COMMERCIAL

## 2022-01-13 VITALS
HEIGHT: 58 IN | SYSTOLIC BLOOD PRESSURE: 134 MMHG | DIASTOLIC BLOOD PRESSURE: 68 MMHG | WEIGHT: 132 LBS | BODY MASS INDEX: 27.71 KG/M2

## 2022-01-13 DIAGNOSIS — M79.661 PAIN IN RIGHT LOWER LEG: ICD-10-CM

## 2022-01-13 DIAGNOSIS — S80.11XA CONTUSION OF RIGHT LOWER EXTREMITY, INITIAL ENCOUNTER: Primary | ICD-10-CM

## 2022-01-13 DIAGNOSIS — M17.0 PRIMARY OSTEOARTHRITIS OF BOTH KNEES: ICD-10-CM

## 2022-01-13 PROCEDURE — 99204 OFFICE O/P NEW MOD 45 MIN: CPT | Performed by: STUDENT IN AN ORGANIZED HEALTH CARE EDUCATION/TRAINING PROGRAM

## 2022-01-13 ASSESSMENT — MIFFLIN-ST. JEOR: SCORE: 890.56

## 2022-01-13 NOTE — PROGRESS NOTES
ASSESSMENT & PLAN    1. Contusion of right lower extremity, initial encounter    2. Pain in right lower leg    3. Primary osteoarthritis of both knees      Lynn Soriano is a 89 year old female presenting for evaluation of right knee/lower leg bruising and swelling following fall 8 days ago.  History, exam and imaging findings were reviewed today, consistent with soft tissue contusion and mild flare of underlying knee arthritis. Low suspicion for insufficiency fracture, significant internal derangement or tendon injury. It is particularly reassuring that she is already walking pain-free. Upon discussion of management options, plan to proceed with the following:   - Relative rest: Avoid activities that provoke the pain  - Continue icing the area for 10-15 minutes 3-4 times per day  - Elevate the leg as much as possible throughout the day to reduce swelling  - Consider gentle compression stockings to encourage swelling resolution as well  - Referral placed for physical therapy to work on gentle knee and hip strengthening joint range of motion, balance and biomechanics as well as soft tissue modalities.   - Tylenol as needed for pain  - May also try topical pain relievers like Aspercreme, SalonPas or IcyHot as needed    Please schedule a follow up appointment to see me in 4-6 weeks if symptoms do not resolve, or sooner as needed for persistence or worsening of pain. You may call our direct clinic number ([phone]) at any time with questions or concerns.    Lupe Vargas MD, [facility]    -----    SUBJECTIVE  Lynn Soriano is a/an 89 year old female who is seen in consultation at the request of  Amna Torres PA-C for evaluation of right leg pain. The patient is seen with their daughter in law.    Onset: 1/5/22. Patient describes injury as she was putting on boots when she turned to pick something up and fell hitting the inside of her knee / lower  leg.  Location of Pain: right medial knee and right medial lower leg  Rating of Pain at worst: 7/10  Rating of Pain Currently: 0/10 at rest  Worsened by: turning over in bed  Better with: rest / activity avoidance  Treatments tried: rest/activity avoidance, elevation, ice, Tylenol 1300mg in AM, and previous imaging ([facility] right knee & right tib/fib xray 1/7/22, [facility] - right knee XR & US 1/11/22)  Associated symptoms: swelling, bruising  Orthopedic history: NO  Relevant surgical history: NO  Social history: retired, enjoys walking daily and staying active    Past Medical History:   Diagnosis Date     Anemia      Closed wedge compression fracture of T10 vertebra (H) 2018    vertebroplasty     GERD (gastroesophageal reflux disease)      Herpes zoster      History of vertebroplasty 03/10/2018    T10     Raynaud's syndrome      Recurrent epistaxis      Senile osteoporosis 2015     Social History     Socioeconomic History     Marital status:      Spouse name: Not on file     Number of children: Not on file     Years of education: Not on file     Highest education level: Not on file   Occupational History     Not on file   Tobacco Use     Smoking status: Never Smoker     Smokeless tobacco: Never Used   Substance and Sexual Activity     Alcohol use: Yes     Comment: rare     Drug use: Never     Sexual activity: Not Currently   Other Topics Concern     Not on file   Social History Narrative     Not on file     Social Determinants of Health     Financial Resource Strain: Not on file   Food Insecurity: Not on file   Transportation Needs: Not on file   Physical Activity: Not on file   Stress: Not on file   Social Connections: Not on file   Intimate Partner Violence: Not on file   Housing Stability: Not on file         Patient's past medical, surgical, social, and family histories were reviewed today and no changes are noted.    REVIEW OF SYSTEMS:  10 point ROS is negative other than symptoms noted above in HPI,  "Past Medical History or as stated below  Constitutional: NEGATIVE for fever, chills, change in weight  Skin: NEGATIVE for worrisome rashes, moles or lesions  GI/: NEGATIVE for bowel or bladder changes  Neuro: NEGATIVE for weakness, dizziness or paresthesias    OBJECTIVE:  /68   Ht 1.461 m (4' 9.5\")   Wt 59.9 kg (132 lb)   BMI 28.07 kg/m     General: healthy, alert and in no distress  HEENT: no scleral icterus or conjunctival erythema  Skin: no suspicious lesions or rash. No jaundice.  CV: mild-moderate diffuse swelling of right lower extremity  Resp: normal respiratory effort without conversational dyspnea   Psych: normal mood and affect  Gait: slow steady gait with appropriate coordination and balance  Neuro: Normal light sensory exam of lower extremity  MSK:  RIGHT KNEE  Inspection:    Genu valgum alignment, Ecchymosis over the anterior and medial proximal tibia extending to mid leg  Palpation:    Tender about the medial joint line (mild) and area of bruising/ecchymosis. Remainder of bony and ligamentous landmarks are nontender.    Small effusion is present    Patellofemoral crepitus is Absent  Range of Motion:     00 extension to 900 flexion  Strength:    Quadriceps 5-/5, hamstrings 5-/5, gastrocsoleus 5/5 and tibialis anterior 5/5    Extensor mechanism intact  Special Tests:    Negative: Patellar grind, MCL/valgus stress (0 & 30 deg), LCL/varus stress (0 & 30 deg), anterior drawer, posterior drawer, Rod's    Independent visualization of the below image:    Results for orders placed or performed in visit on 01/07/22   XR Knee Right 3 Views    Narrative    RIGHT KNEE THREE VIEWS  1/7/2022 12:26 PM     HISTORY: Leg injury, right, initial encounter.    COMPARISON: None available.      Impression    IMPRESSION:  Diffuse bone demineralization. Large right knee joint  effusion. 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 No acute " displaced  right knee fracture. No significant anterior right knee soft tissue  swelling. Vascular calcification.    DEL CARRILLO MD         SYSTEM ID:  [id number]       XR TIBIA AND FIBULA RIGHT TWO VIEW 1/7/2022 12:25 PM   IMPRESSION:  Anatomic alignment right tibia and fibula. No acute displaced right  tibia or fibula fracture. Degenerative arthrosis right knee with  chondrocalcinosis. Vascular and soft tissue calcification. Generalized  right leg soft tissue swelling with moderate bimalleolar ankle soft  tissue swelling.  DEL CARRILLO MD     XR KNEE 3 VIEWS RIGHT 1/11/2022 5:04 PM   IMPRESSION: No acute fracture or malalignment. Moderate medial, mild lateral, and mild patellofemoral compartment degenerative changes. Moderate knee joint effusion. Chondrocalcinosis. Osteopenia. Atherosclerosis.    US VENOUS LOWER EXTREMITY BILATERAL 1/11/2022 4:59 PM   IMPRESSION:   1.  No deep venous thrombosis in the bilateral lower extremities.      Lupe Vargas MD, [facility]

## 2022-01-13 NOTE — LETTER
1/13/2022         RE: Lynn Soriano  [address]  [address]  [place] [address]        Dear Colleague,    Thank you for referring your patient, Lynn Soriano, to the [facility] [place]. Please see a copy of my visit note below.    ASSESSMENT & PLAN    1. Contusion of right lower extremity, initial encounter    2. Pain in right lower leg    3. Primary osteoarthritis of both knees      Lynn Soriano is a 89 year old female presenting for evaluation of right knee/lower leg bruising and swelling following fall 8 days ago.  History, exam and imaging findings were reviewed today, consistent with soft tissue contusion and mild flare of underlying knee arthritis. Low suspicion for insufficiency fracture, significant internal derangement or tendon injury. It is particularly reassuring that she is already walking pain-free. Upon discussion of management options, plan to proceed with the following:   - Relative rest: Avoid activities that provoke the pain  - Continue icing the area for 10-15 minutes 3-4 times per day  - Elevate the leg as much as possible throughout the day to reduce swelling  - Consider gentle compression stockings to encourage swelling resolution as well  - Referral placed for physical therapy to work on gentle knee and hip strengthening joint range of motion, balance and biomechanics as well as soft tissue modalities.   - Tylenol as needed for pain  - May also try topical pain relievers like Aspercreme, SalonPas or IcyHot as needed    Please schedule a follow up appointment to see me in 4-6 weeks if symptoms do not resolve, or sooner as needed for persistence or worsening of pain. You may call our direct clinic number ([phone]) at any time with questions or concerns.    Lupe Vargas MD, [facility]    -----    SUBJECTIVE  Lynn Soriano is a/an 89 year old female who is seen in consultation at the request of   Amna Torres PA-C for evaluation of right leg pain. The patient is seen with their daughter in law.    Onset: 1/5/22. Patient describes injury as she was putting on boots when she turned to pick something up and fell hitting the inside of her knee / lower leg.  Location of Pain: right medial knee and right medial lower leg  Rating of Pain at worst: 7/10  Rating of Pain Currently: 0/10 at rest  Worsened by: turning over in bed  Better with: rest / activity avoidance  Treatments tried: rest/activity avoidance, elevation, ice, Tylenol 1300mg in AM, and previous imaging ([facility] right knee & right tib/fib xray 1/7/22, [facility] - right knee XR & US 1/11/22)  Associated symptoms: swelling, bruising  Orthopedic history: NO  Relevant surgical history: NO  Social history: retired, enjoys walking daily and staying active    Past Medical History:   Diagnosis Date     Anemia      Closed wedge compression fracture of T10 vertebra (H) 2018    vertebroplasty     GERD (gastroesophageal reflux disease)      Herpes zoster      History of vertebroplasty 03/10/2018    T10     Raynaud's syndrome      Recurrent epistaxis      Senile osteoporosis 2015     Social History     Socioeconomic History     Marital status:      Spouse name: Not on file     Number of children: Not on file     Years of education: Not on file     Highest education level: Not on file   Occupational History     Not on file   Tobacco Use     Smoking status: Never Smoker     Smokeless tobacco: Never Used   Substance and Sexual Activity     Alcohol use: Yes     Comment: rare     Drug use: Never     Sexual activity: Not Currently   Other Topics Concern     Not on file   Social History Narrative     Not on file     Social Determinants of Health     Financial Resource Strain: Not on file   Food Insecurity: Not on file   Transportation Needs: Not on file   Physical Activity: Not on file   Stress: Not on file   Social Connections: Not on file   Intimate  "Partner Violence: Not on file   Housing Stability: Not on file         Patient's past medical, surgical, social, and family histories were reviewed today and no changes are noted.    REVIEW OF SYSTEMS:  10 point ROS is negative other than symptoms noted above in HPI, Past Medical History or as stated below  Constitutional: NEGATIVE for fever, chills, change in weight  Skin: NEGATIVE for worrisome rashes, moles or lesions  GI/: NEGATIVE for bowel or bladder changes  Neuro: NEGATIVE for weakness, dizziness or paresthesias    OBJECTIVE:  /68   Ht 1.461 m (4' 9.5\")   Wt 59.9 kg (132 lb)   BMI 28.07 kg/m     General: healthy, alert and in no distress  HEENT: no scleral icterus or conjunctival erythema  Skin: no suspicious lesions or rash. No jaundice.  CV: mild-moderate diffuse swelling of right lower extremity  Resp: normal respiratory effort without conversational dyspnea   Psych: normal mood and affect  Gait: slow steady gait with appropriate coordination and balance  Neuro: Normal light sensory exam of lower extremity  MSK:  RIGHT KNEE  Inspection:    Genu valgum alignment, Ecchymosis over the anterior and medial proximal tibia extending to mid leg  Palpation:    Tender about the medial joint line (mild) and area of bruising/ecchymosis. Remainder of bony and ligamentous landmarks are nontender.    Small effusion is present    Patellofemoral crepitus is Absent  Range of Motion:     00 extension to 900 flexion  Strength:    Quadriceps 5-/5, hamstrings 5-/5, gastrocsoleus 5/5 and tibialis anterior 5/5    Extensor mechanism intact  Special Tests:    Negative: Patellar grind, MCL/valgus stress (0 & 30 deg), LCL/varus stress (0 & 30 deg), anterior drawer, posterior drawer, Rod's    Independent visualization of the below image:    Results for orders placed or performed in visit on 01/07/22   XR Knee Right 3 Views    Narrative    RIGHT KNEE THREE VIEWS  1/7/2022 12:26 PM     HISTORY: Leg injury, right, " initial encounter.    COMPARISON: None available.      Impression    IMPRESSION:  Diffuse bone demineralization. Large right knee joint  effusion. 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 No acute displaced  right knee fracture. No significant anterior right knee soft tissue  swelling. Vascular calcification.    DEL CARRILLO MD         SYSTEM ID:  [id number]       XR TIBIA AND FIBULA RIGHT TWO VIEW 1/7/2022 12:25 PM   IMPRESSION:  Anatomic alignment right tibia and fibula. No acute displaced right  tibia or fibula fracture. Degenerative arthrosis right knee with  chondrocalcinosis. Vascular and soft tissue calcification. Generalized  right leg soft tissue swelling with moderate bimalleolar ankle soft  tissue swelling.  DEL CARRILLO MD     XR KNEE 3 VIEWS RIGHT 1/11/2022 5:04 PM   IMPRESSION: No acute fracture or malalignment. Moderate medial, mild lateral, and mild patellofemoral compartment degenerative changes. Moderate knee joint effusion. Chondrocalcinosis. Osteopenia. Atherosclerosis.    US VENOUS LOWER EXTREMITY BILATERAL 1/11/2022 4:59 PM   IMPRESSION:   1.  No deep venous thrombosis in the bilateral lower extremities.      Lupe Vargas MD, [facility]        Again, thank you for allowing me to participate in the care of your patient.        Sincerely,        Lupe Vargas MD

## 2022-01-13 NOTE — PATIENT INSTRUCTIONS
1. Contusion of right lower extremity, initial encounter    2. Pain in right lower leg    3. Primary osteoarthritis of both knees      Lynn Soriano is a 89 year old female presenting for evaluation of right knee/lower leg bruising and swelling following fall 8 days ago.  History, exam and imaging findings were reviewed today, consistent with soft tissue contusion and mild flare of underlying knee arthritis. Low suspicion for insufficiency fracture, significant internal derangement or tendon injury. It is particularly reassuring that she is already walking pain-free. Upon discussion of management options, plan to proceed with the following:   - Relative rest: Avoid activities that provoke the pain  - Continue icing the area for 10-15 minutes 3-4 times per day  - Elevate the leg as much as possible throughout the day to reduce swelling  - Consider gentle compression stockings to encourage swelling resolution as well  - Referral placed for physical therapy to work on gentle knee and hip strengthening joint range of motion, balance and biomechanics as well as soft tissue modalities.   - Tylenol as needed for pain  - May also try topical pain relievers like Aspercreme, SalonPas or IcyHot as needed    Please schedule a follow up appointment to see me in 4-6 weeks if symptoms do not resolve, or sooner as needed for persistence or worsening of pain. You may call our direct clinic number ([phone]) at any time with questions or concerns.    Lupe Vargas MD, [facility]

## 2022-01-27 ENCOUNTER — TRANSFERRED RECORDS (OUTPATIENT)
Dept: HEALTH INFORMATION MANAGEMENT | Facility: CLINIC | Age: 87
End: 2022-01-27
Payer: COMMERCIAL

## 2022-02-07 ENCOUNTER — OFFICE VISIT (OUTPATIENT)
Dept: PEDIATRICS | Facility: CLINIC | Age: 87
End: 2022-02-07
Payer: COMMERCIAL

## 2022-02-07 VITALS
RESPIRATION RATE: 16 BRPM | DIASTOLIC BLOOD PRESSURE: 76 MMHG | HEART RATE: 100 BPM | TEMPERATURE: 96.9 F | SYSTOLIC BLOOD PRESSURE: 130 MMHG | BODY MASS INDEX: 25.58 KG/M2 | WEIGHT: 120.3 LBS

## 2022-02-07 DIAGNOSIS — N39.0 URINARY TRACT INFECTION WITHOUT HEMATURIA, SITE UNSPECIFIED: ICD-10-CM

## 2022-02-07 DIAGNOSIS — R41.0 CONFUSION: Primary | ICD-10-CM

## 2022-02-07 LAB
ALBUMIN UR-MCNC: NEGATIVE MG/DL
APPEARANCE UR: CLEAR
BACTERIA #/AREA URNS HPF: ABNORMAL /HPF
BILIRUB UR QL STRIP: NEGATIVE
COLOR UR AUTO: YELLOW
ERYTHROCYTE [DISTWIDTH] IN BLOOD BY AUTOMATED COUNT: 12.8 % (ref 10–15)
ERYTHROCYTE [SEDIMENTATION RATE] IN BLOOD BY WESTERGREN METHOD: 10 MM/HR (ref 0–30)
GLUCOSE UR STRIP-MCNC: NEGATIVE MG/DL
HCT VFR BLD AUTO: 40.9 % (ref 35–47)
HGB BLD-MCNC: 13.1 G/DL (ref 11.7–15.7)
HGB UR QL STRIP: NEGATIVE
KETONES UR STRIP-MCNC: ABNORMAL MG/DL
LEUKOCYTE ESTERASE UR QL STRIP: ABNORMAL
MCH RBC QN AUTO: 32.8 PG (ref 26.5–33)
MCHC RBC AUTO-ENTMCNC: 32 G/DL (ref 31.5–36.5)
MCV RBC AUTO: 102 FL (ref 78–100)
NITRATE UR QL: POSITIVE
PH UR STRIP: 5.5 [PH] (ref 5–7)
PLATELET # BLD AUTO: 209 10E3/UL (ref 150–450)
RBC # BLD AUTO: 4 10E6/UL (ref 3.8–5.2)
RBC #/AREA URNS AUTO: ABNORMAL /HPF
SP GR UR STRIP: 1.02 (ref 1–1.03)
UROBILINOGEN UR STRIP-ACNC: 0.2 E.U./DL
WBC # BLD AUTO: 6.6 10E3/UL (ref 4–11)
WBC #/AREA URNS AUTO: ABNORMAL /HPF

## 2022-02-07 PROCEDURE — 84443 ASSAY THYROID STIM HORMONE: CPT | Performed by: STUDENT IN AN ORGANIZED HEALTH CARE EDUCATION/TRAINING PROGRAM

## 2022-02-07 PROCEDURE — 93000 ELECTROCARDIOGRAM COMPLETE: CPT | Performed by: STUDENT IN AN ORGANIZED HEALTH CARE EDUCATION/TRAINING PROGRAM

## 2022-02-07 PROCEDURE — 87086 URINE CULTURE/COLONY COUNT: CPT | Performed by: STUDENT IN AN ORGANIZED HEALTH CARE EDUCATION/TRAINING PROGRAM

## 2022-02-07 PROCEDURE — 99214 OFFICE O/P EST MOD 30 MIN: CPT | Performed by: STUDENT IN AN ORGANIZED HEALTH CARE EDUCATION/TRAINING PROGRAM

## 2022-02-07 PROCEDURE — 81001 URINALYSIS AUTO W/SCOPE: CPT | Performed by: STUDENT IN AN ORGANIZED HEALTH CARE EDUCATION/TRAINING PROGRAM

## 2022-02-07 PROCEDURE — 82607 VITAMIN B-12: CPT | Performed by: STUDENT IN AN ORGANIZED HEALTH CARE EDUCATION/TRAINING PROGRAM

## 2022-02-07 PROCEDURE — 85027 COMPLETE CBC AUTOMATED: CPT | Performed by: STUDENT IN AN ORGANIZED HEALTH CARE EDUCATION/TRAINING PROGRAM

## 2022-02-07 PROCEDURE — 36415 COLL VENOUS BLD VENIPUNCTURE: CPT | Performed by: STUDENT IN AN ORGANIZED HEALTH CARE EDUCATION/TRAINING PROGRAM

## 2022-02-07 PROCEDURE — 85652 RBC SED RATE AUTOMATED: CPT | Performed by: STUDENT IN AN ORGANIZED HEALTH CARE EDUCATION/TRAINING PROGRAM

## 2022-02-07 PROCEDURE — 87186 SC STD MICRODIL/AGAR DIL: CPT | Performed by: STUDENT IN AN ORGANIZED HEALTH CARE EDUCATION/TRAINING PROGRAM

## 2022-02-07 PROCEDURE — 80053 COMPREHEN METABOLIC PANEL: CPT | Performed by: STUDENT IN AN ORGANIZED HEALTH CARE EDUCATION/TRAINING PROGRAM

## 2022-02-07 RX ORDER — SULFAMETHOXAZOLE/TRIMETHOPRIM 800-160 MG
1 TABLET ORAL 2 TIMES DAILY
Qty: 6 TABLET | Refills: 0 | Status: SHIPPED | OUTPATIENT
Start: 2022-02-07 | End: 2022-02-10

## 2022-02-07 NOTE — PROGRESS NOTES
Assessment & Plan     Confusion  Suspect UTI (urinary tract infection) as culprit of acute changes in sensorium. Patient with normal mental status currently in clinic. Will check other labs as per below. Of note, EKG showed RBBB (no prior available) but do not think patient having active cardiac event. Discussed plan with patient, son, niece and in agreement with plan. Discussed symptomatic cares and reasons to present to ED.  - CBC with platelets; Future  - Comprehensive metabolic panel (BMP + Alb, Alk Phos, ALT, AST, Total. Bili, TP); Future  - ESR: Erythrocyte sedimentation rate; Future  - UA with Microscopic reflex to Culture - lab collect; Future  - EKG 12-lead complete w/read - Clinics  - UA with Microscopic reflex to Culture - lab collect  - Urine Microscopic Exam  - Urine Culture  - TSH with free T4 reflex; Future  - CBC with platelets  - Comprehensive metabolic panel (BMP + Alb, Alk Phos, ALT, AST, Total. Bili, TP)  - ESR: Erythrocyte sedimentation rate  - TSH with free T4 reflex    Urinary tract infection without hematuria, site unspecified  urinalysis consistent with UTI (urinary tract infection). Given confusion patient may not be able to fully express symptoms. History consistent with delirium, with infection as possible trigger. If confusion does not improve with treatment would consider brain MRI.  - sulfamethoxazole-trimethoprim (BACTRIM DS) 800-160 MG tablet; Take 1 tablet by mouth 2 times daily for 3 days      Return in about 1 week (around 2/14/2022), or if symptoms worsen or fail to improve.  -would obtain brain MRI +/- neurology referral      Aviva Woodruff MD  [facility] BERYL Bailey is a 89 year old who presents for the following health issues  accompanied by her son and niece (who is caregiver).    HPI     Memory loss  -Saturday (2 days ago) was talking to   -saw submarine on screen but maybe thought it was in the living room  -didn't seem to come out of while  daughter was there for 2 hours    -eyes seemed distant    -did not want to go to bed    -told niece she went to bed at 4am    -did not feel tired  -yesterday morning seemed ok    -didn't get much sleep yesterday    -was at cabin, went to dinner and didn't notice much confusion  -sits in the dark a lot and had television on often  -denies abdominal pain, dysuria, urinary frequency (wears pads at night), fever, cough, chest pain/tightness, shortness of breath   -does not drink a lot of water during the day- mostly coffee  -caregiver will be moving in tomorrow and able to keep clsoer eye on symptoms, water drinking, etc.        Objective    /76 (BP Location: Right arm, Patient Position: Sitting, Cuff Size: Adult Small)   Pulse 100   Temp 96.9  F (36.1  C) (Temporal)   Resp 16   Wt 54.6 kg (120 lb 4.8 oz)   BMI 25.58 kg/m    Body mass index is 25.58 kg/m .  Physical Exam   GEN: Alert and appropriately interactive for age  EYES: Eyes grossly normal to inspection, conjunctivae and sclerae normal  RESP: Breathing comfortably on room air; lungs clear to auscultation bilateral   CV: Warm and well perfused peripheral extremities; RRR no murmur   MS: no gross musculoskeletal defects noted, 1+ pitting edema to knee in right lower extremity; no lower extremity edema in left lower extremity   NEURO: Alert and oriented to person, place, situation. Gait normal. Speech fluent.    PSYCH: Affect normal/bright. Appearance well groomed.       Results for orders placed or performed in visit on 02/07/22 (from the past 24 hour(s))   UA with Microscopic reflex to Culture - lab collect    Specimen: Urine, Clean Catch   Result Value Ref Range    Color Urine Yellow Colorless, Straw, Light Yellow, Yellow    Appearance Urine Clear Clear    Glucose Urine Negative Negative mg/dL    Bilirubin Urine Negative Negative    Ketones Urine Trace (A) Negative mg/dL    Specific Gravity Urine 1.025 1.003 - 1.035    Blood Urine Negative Negative     pH Urine 5.5 5.0 - 7.0    Protein Albumin Urine Negative Negative mg/dL    Urobilinogen Urine 0.2 0.2, 1.0 E.U./dL    Nitrite Urine Positive (A) Negative    Leukocyte Esterase Urine Trace (A) Negative   Urine Microscopic Exam   Result Value Ref Range    Bacteria Urine Many (A) None Seen /HPF    RBC Urine 0-2 0-2 /HPF /HPF    WBC Urine 0-5 0-5 /HPF /HPF

## 2022-02-07 NOTE — PATIENT INSTRUCTIONS
I recommend compression socks for the leg swelling.      I think you have a UTI (urinary tract infection)     Drink more water! :)      If confusion doesn't improve let ultrasound know because you may need a brain MRI or to see a neurologist      Patient Education     Bladder Infection, Female (Adult)     Urine normally doesn't have any germs (bacteria) in it. But bacteria can get into the urinary tract from the skin around the rectum. Or they can travel in the blood from other parts of the body. Once they are in your urinary tract, they can cause infection in these areas:    The urethra (urethritis)    The bladder (cystitis)    The kidneys (pyelonephritis)  The most common place for an infection is in the bladder. This is called a bladder infection. This is one of the most common infections in women. Most bladder infections are easily treated. They are not serious unless the infection spreads to the kidney.  The terms bladder infection, UTI, and cystitis are often used to describe the same thing. But they are not always the same. Cystitis is an inflammation of the bladder. The most common cause of cystitis is an infection.  Symptoms  The infection causes inflammation in the urethra and bladder. This causes many of the symptoms. The most common symptoms of a bladder infection are:    Pain or burning when urinating    Having to urinate more often than normal    Urgent need to urinate    Only a small amount of urine comes out    Blood in urine    Belly (abdominal) discomfort. This is often in the lower belly above the pubic bone.    Cloudy urine    Strong- or bad-smelling urine    Unable to urinate (urinary retention)    Unable to hold urine in (urinary incontinence)    Fever    Loss of appetite    Confusion (in older adults)  Causes  Bladder infections are not contagious. You can't get one from someone else, from a toilet seat, or from sharing a bath.  The most common cause of bladder infections is bacteria from the  bowels. The bacteria get onto the skin around the opening of the urethra. From there, they can get into the urine. Then they travel up to the bladder, causing inflammation and infection. This often happens because of:    Wiping incorrectly after urinating. Always wipe from front to back.    Bowel incontinence    Pregnancy    Procedures such as having a catheter put in    Older age    Not emptying your bladder. This can give bacteria a chance to grow in your urine.    Fluid loss (dehydration)    Constipation    Having sex    Using a diaphragm for birth control   Treatment  Bladder infections are diagnosed by a urine test and urine culture. They are treated with antibiotics. They often clear up quickly without problems. Treatment helps prevent a more serious kidney infection.  Medicines  Medicines can help in the treatment of a bladder infection:    Take antibiotics until they are used up, even if you feel better. It's important to finish them to make sure the infection has cleared.    You can use acetaminophen or ibuprofen for pain, fever, or discomfort, unless another medicine was prescribed. If you have long-term (chronic) liver or kidney disease, talk with your healthcare provider before using these medicines. Also talk with your provider if you've ever had a stomach ulcer or GI (gastrointestinal) bleeding, or are taking blood-thinner medicines.    If you are given phenazopydridine to reduce burning with urination, it will make your urine a bright orange color. This can stain clothing.  Care and prevention  These self-care steps can help prevent future infections:    Drink plenty of fluids. This helps to prevent dehydration and flush out your bladder. Do this unless you must restrict fluids for other health reasons, or your healthcare provider told you not to.    Clean yourself correctly after going to the bathroom. Wipe from front to back after using the toilet. This helps prevent the spread of  bacteria.    Urinate more often. Don't try to hold urine in for a long time.    Wear loose-fitting clothes and cotton underwear. Don't wear tight-fitting pants.    Improve your diet and prevent constipation. Eat more fresh fruits and vegetables, and fiber. Eat less junk foods and fatty foods.    Don't have sex until your symptoms are gone.    Don't have caffeine, alcohol, and spicy foods. These can irritate your bladder.    Urinate right after you have sex to flush out your bladder.    If you use birth control pills and have frequent bladder infections, discuss it with your healthcare provider.  Follow-up care  Call your healthcare provider if all symptoms are not gone after 3 days of treatment. This is especially important if you have repeat infections.  If a culture was done, you will be told if your treatment needs to be changed. If directed, you can call to find out the results.  If X-rays were done, you will be told if the results will affect your treatment.  Call 911  Call 911 if any of the following occur:    Trouble breathing    Hard to wake up or confusion    Fainting (loss of consciousness)    Fast heart rate  When to get medical advice  Call your healthcare provider right away if any of these occur:    Fever of 100.4 F (38.0 C) or higher, or as directed by your healthcare provider    Symptoms are not better after 3 days of treatment    Back or belly pain that gets worse    Repeated vomiting, or unable to keep medicine down    Weakness or dizziness    Vaginal discharge    Pain, redness, or swelling in the outer vaginal area (labia)

## 2022-02-08 LAB
ALBUMIN SERPL-MCNC: 3.9 G/DL (ref 3.4–5)
ALP SERPL-CCNC: 56 U/L (ref 40–150)
ALT SERPL W P-5'-P-CCNC: 20 U/L (ref 0–50)
ANION GAP SERPL CALCULATED.3IONS-SCNC: 10 MMOL/L (ref 3–14)
AST SERPL W P-5'-P-CCNC: 19 U/L (ref 0–45)
BACTERIA UR CULT: ABNORMAL
BILIRUB SERPL-MCNC: 0.5 MG/DL (ref 0.2–1.3)
BUN SERPL-MCNC: 27 MG/DL (ref 7–30)
CALCIUM SERPL-MCNC: 9.3 MG/DL (ref 8.5–10.1)
CHLORIDE BLD-SCNC: 107 MMOL/L (ref 94–109)
CO2 SERPL-SCNC: 24 MMOL/L (ref 20–32)
CREAT SERPL-MCNC: 0.69 MG/DL (ref 0.52–1.04)
GFR SERPL CREATININE-BSD FRML MDRD: 81 ML/MIN/1.73M2
GLUCOSE BLD-MCNC: 96 MG/DL (ref 70–99)
POTASSIUM BLD-SCNC: 4 MMOL/L (ref 3.4–5.3)
PROT SERPL-MCNC: 7.4 G/DL (ref 6.8–8.8)
SODIUM SERPL-SCNC: 141 MMOL/L (ref 133–144)
TSH SERPL DL<=0.005 MIU/L-ACNC: 3.33 MU/L (ref 0.4–4)
VIT B12 SERPL-MCNC: 358 PG/ML (ref 193–986)

## 2022-02-16 ENCOUNTER — VIRTUAL VISIT (OUTPATIENT)
Dept: PEDIATRICS | Facility: CLINIC | Age: 87
End: 2022-02-16
Payer: COMMERCIAL

## 2022-02-16 DIAGNOSIS — Z87.440 RECENT URINARY TRACT INFECTION: ICD-10-CM

## 2022-02-16 DIAGNOSIS — Z86.39 HISTORY OF LOW POTASSIUM: ICD-10-CM

## 2022-02-16 DIAGNOSIS — Z79.899 ENCOUNTER FOR MEDICATION REVIEW: Primary | ICD-10-CM

## 2022-02-16 PROCEDURE — 99215 OFFICE O/P EST HI 40 MIN: CPT | Mod: 95 | Performed by: PHYSICIAN ASSISTANT

## 2022-02-16 RX ORDER — POTASSIUM CHLORIDE 750 MG/1
TABLET, EXTENDED RELEASE ORAL
Qty: 90 TABLET | Refills: 3 | Status: SHIPPED | OUTPATIENT
Start: 2022-02-16 | End: 2022-04-28

## 2022-02-16 NOTE — PROGRESS NOTES
Lynn is a 89 year old who is being evaluated via a billable telephone visit.      Assessment & Plan     Encounter for medication review  Discussed all meds and plan to continue/discontinue as listed below.    History of low potassium  Refills given.  - potassium chloride ER (K-TAB/KLOR-CON) 10 MEQ CR tablet; TAKE 1 TABLET(10 MEQ) BY MOUTH DAILY    Recent urinary tract infection  Repeat UA to ensure resolution.  - UA Macro with Reflex to Micro and Culture - lab collect; Future    Greater than 45 minutes was spent with patient today with the majority spent on counseling and coordination of care for the conditions listed above, chart review, chart update, etc.    Campos Willams PA-C  [facility] BERYL Bailey is a 89 year old who presents for the following health issues:    HPI   Patient Lynn with daughter, Ivonne.   Also, niece Neha.   Three way phone call.     Transitioning to assisted living facility--[facility] and costs more to give nightly meds. They would like all meds to be taken once daily or if at all.     1.Several years ago, placed on oxybutynin daily. No urgency. Continues to have urinary incontinence. Patient doesn't seem to have relief in symptoms. Wears pads/diapers.   Plan: stop oxybutynin altogether.     2. Tylenol 650 mg tablets twice daily.   Plan: Take 650 mg in AM, may take PM dose PRN (and on her own)    3. Omeprazole 20 mg daily. Asymptomatic.   Plan: trial two weeks off medication. If symptoms return, restart daily.     4. Potassium. History of low potassium. Unsure why. Last lab check one week ago reveals a K of 4.0   Plan: continue potassium as directed    5. Fe. No history of bleeding or anemia. Last Hemoglobin normal. .   Plan: Discontinue Fe.    6. MVI. Taking one daily  Plan: continue    7.  Calcium/vitamin D. History of osteoporosis,  compression fractures, post menopausal.   Plan: take 2 calcium/vitamin D qAM.     8. Fish oil.  Cholesterol normal upon last check (2018).   Plan: discontinue    9. Colace twice daily. Patient has regular BMs.   Plan: continue once daily, patient will take PM dose (from her own supply)    10. Saline spray. Continue.    Also, recent UTI. Finished three days of bactrim. Confusion improved. UC POSITIVE. Patient and family would like a repeat UA to ensure resolution of infection.     Review of Systems   Constitutional, HEENT, cardiovascular, pulmonary, gi and gu systems are negative, except as otherwise noted.      Objective         Vitals:  No vitals were obtained today due to virtual visit.    Physical Exam   alert and no distress  PSYCH: Alert and oriented times 3; coherent speech, normal   rate and volume, able to articulate logical thoughts, able   to abstract reason, no tangential thoughts, no hallucinations   or delusions  Her affect is normal  RESP: No cough, no audible wheezing, able to talk in full sentences  Remainder of exam unable to be completed due to telephone visits    Phone call duration:  38 minutes

## 2022-02-17 LAB
ALBUMIN UR-MCNC: NEGATIVE MG/DL
APPEARANCE UR: CLEAR
BACTERIA #/AREA URNS HPF: ABNORMAL /HPF
BILIRUB UR QL STRIP: NEGATIVE
COLOR UR AUTO: YELLOW
GLUCOSE UR STRIP-MCNC: NEGATIVE MG/DL
HGB UR QL STRIP: NEGATIVE
KETONES UR STRIP-MCNC: NEGATIVE MG/DL
LEUKOCYTE ESTERASE UR QL STRIP: ABNORMAL
NITRATE UR QL: NEGATIVE
PH UR STRIP: 5.5 [PH] (ref 5–7)
RBC #/AREA URNS AUTO: ABNORMAL /HPF
SP GR UR STRIP: 1.01 (ref 1–1.03)
SQUAMOUS #/AREA URNS AUTO: ABNORMAL /LPF
UROBILINOGEN UR STRIP-ACNC: 0.2 E.U./DL
WBC #/AREA URNS AUTO: ABNORMAL /HPF

## 2022-02-17 PROCEDURE — 81001 URINALYSIS AUTO W/SCOPE: CPT

## 2022-02-24 ENCOUNTER — HOSPITAL ENCOUNTER (EMERGENCY)
Facility: CLINIC | Age: 87
Discharge: HOME | End: 2022-02-24
Attending: EMERGENCY MEDICINE | Admitting: EMERGENCY MEDICINE
Payer: COMMERCIAL

## 2022-02-24 VITALS
RESPIRATION RATE: 18 BRPM | SYSTOLIC BLOOD PRESSURE: 142 MMHG | HEART RATE: 103 BPM | DIASTOLIC BLOOD PRESSURE: 86 MMHG | OXYGEN SATURATION: 100 % | TEMPERATURE: 98.3 F

## 2022-02-24 DIAGNOSIS — R04.0 EPISTAXIS: ICD-10-CM

## 2022-02-24 PROCEDURE — 99283 EMERGENCY DEPT VISIT LOW MDM: CPT

## 2022-02-24 PROCEDURE — 30901 CONTROL OF NOSEBLEED: CPT

## 2022-02-24 RX ORDER — LIDOCAINE HYDROCHLORIDE AND EPINEPHRINE 10; 10 MG/ML; UG/ML
INJECTION, SOLUTION INFILTRATION; PERINEURAL
Status: DISCONTINUED
Start: 2022-02-24 | End: 2022-02-24 | Stop reason: HOSPADM

## 2022-02-24 RX ORDER — OXYMETAZOLINE HCL 0.05 %
2 SPRAY, NON-AEROSOL (ML) NASAL 2 TIMES DAILY
Refills: 0 | COMMUNITY
Start: 2022-02-24 | End: 2022-02-27

## 2022-02-24 RX ORDER — TRANEXAMIC ACID 100 MG/ML
INJECTION, SOLUTION INTRAVENOUS
Status: DISCONTINUED
Start: 2022-02-24 | End: 2022-02-24 | Stop reason: HOSPADM

## 2022-02-24 NOTE — ED NOTES
Bed: ED09  Expected date:   Expected time:   Means of arrival:   Comments:  [facility] 89yo

## 2022-02-24 NOTE — ED PROVIDER NOTES
History   Chief Complaint:  Epistaxis     The history is provided by the patient.      Lynn Soriano is a 89 year old female who presents via EMS with nosebleed. EMS reports the patient developed a nosebleed at 2300 last night. Patient denies any trauma or injury. EMS gave two sprays of Neosynephrine which slowed the bleeding. It was dripping down her throat.  She has had nosebleeds in the past which required cauterization. She is not on blood thinners.     Review of Systems   HENT: Positive for nosebleeds.    All other systems reviewed and are negative.    Allergies:  Aspirin   Penicillins     Medications:  Colace  K-tab    Past Medical History:     Anemia   GERD   Herpes zoster  Raynaud's syndrome  Recurrent epistaxis  Senile osteoporosis  Arthritis   Shingles   Hypertension     Carpal tunnel syndrome  Acute bronchitis   Cervicalgia   Hypokalemia     Past Surgical History:    Lumbar epidural steroid injection multiple  Thoracic vertebroplasty      Family History:    Sister: nephrolithiasis, osteoporosis     Social History:  Presents to ED alone  Presents via EMS     Physical Exam     Patient Vitals for the past 24 hrs:   BP Temp Temp src Pulse Resp SpO2   02/24/22 0127 (!) 142/86 98.3  F (36.8  C) Temporal 103 18 100 %       Physical Exam  General: Patient is alert, awake and interactive when I enter the room  Head: The scalp, face, and head appear normal  Eyes: Conjunctivae are normal  ENT: Ongoing bleeding from the right nasal septum the nose is normal, Pinnae are normal, External acoustic canals are normal  Neck: Trachea midline  CV: Pulses are normal.   Resp: No respiratory distress   Musc: Normal muscular tone, moving all extremities.  Skin: No rash or lesions noted  Neuro: Speech is normal and fluent. Face is symmetric.   Psych: Normal affect.  Appropriate interactions.    Emergency Department Course     Emergency Department Course:     Reviewed:  I reviewed nursing notes, vitals, past medical history and  Care Everywhere    Epistaxis management     PROCEDURE NOTE: The nose was initially cleared of all pre-existing clot by having the patient blow her nose.  Afrin was then applied and a nasal clamp was applied for approximately 20 minutes.  Once nasal clamp was removed there was still a small amount of oozing to the wall of the right nasal septum.  Small amount of lidocaine with epinephrine was aerosolized into the right nare. TXA soaked pledgets were applied to the area and nasal clamp was reapplied.  Patient was reexamined in 20 minutes and pledgets were removed.  No ongoing bleeding was noted.  No cautery was required.  No complications.  Patient tolerated well.      Assessments:  0125 I obtained history and examined the patient as noted above. Applied Afrin to nostril.   0214 I rechecked the patient. Bleeding stopped after pledgets soaked in TXA.   0231 I rechecked the patient.     Disposition:  The patient was discharged to home.     Impression & Plan     Medical Decision Making:  Lynn Soriano is a 89 year old female who presents for evaluation of nasal bleeding and epistaxis.  The bleeding was controlled with interventions in the ED and therefore supportive outpatient management is indicated. There are no signs of coagulopathy causing the bleeding or a general medical condition (thrombocytopenia, DIC, leukemia, etc) causing the bleeding today.      Diagnosis:    ICD-10-CM    1. Epistaxis  R04.0        Discharge Medications:  New Prescriptions    OXYMETAZOLINE (AFRIN NASAL SPRAY) 0.05 % NASAL SPRAY    Spray 2 sprays in nostril 2 times daily for 3 days       Scribe Disclosure:  Nathan MONTE, am serving as a scribe at 1:24 AM on 2/24/2022 to document services personally performed by Santo Alcantara MD based on my observations and the provider's statements to me.              Santo Alcantara MD  02/24/22 0252

## 2022-02-28 ENCOUNTER — NURSE TRIAGE (OUTPATIENT)
Dept: NURSING | Facility: CLINIC | Age: 87
End: 2022-02-28
Payer: COMMERCIAL

## 2022-02-28 DIAGNOSIS — Z86.39 HISTORY OF LOW POTASSIUM: ICD-10-CM

## 2022-02-28 RX ORDER — DOCUSATE SODIUM 100 MG/1
100 CAPSULE, LIQUID FILLED ORAL DAILY
COMMUNITY
Start: 2022-02-28 | End: 2022-09-01

## 2022-02-28 RX ORDER — ELECTROLYTES/DEXTROSE
1 SOLUTION, ORAL ORAL DAILY
COMMUNITY
Start: 2022-02-28 | End: 2022-04-06

## 2022-02-28 RX ORDER — ACETAMINOPHEN 325 MG/1
650 TABLET ORAL EVERY MORNING
COMMUNITY
Start: 2022-02-28 | End: 2022-03-28

## 2022-02-28 NOTE — TELEPHONE ENCOUNTER
Alexandria, RN    Nurse at [facility].  Patient will be   Starting to get medications administered per staff.tomorrow, and faxed orders are incomplete.   Please complete these , and FAX back to [facility].    Tylenol has no directions per Dr. Smith.    Calcium Carbonate,  No instructions for this med listed.    Docusate Sodium , No directions or   PRN or QDay.    Mult Vitamin, No directions.    Saline , sodium solution  For nasal , no directions on when and how often.    Please clarify these meds , and send back to [facility].  Michelle Snow  In [place], [place].    Fax # [phone]  MELIZA Ibrahim RN   [facility] Nurse Advisor            Additional Information    Information only question and nurse able to answer    Protocols used: INFORMATION ONLY CALL - NO TRIAGE-A-OH

## 2022-02-28 NOTE — TELEPHONE ENCOUNTER
Updated med list in my outbasket, please fax.    Priscilla Smith MD  Internal Medicine-Pediatrics

## 2022-03-01 ENCOUNTER — HOSPITAL ENCOUNTER (EMERGENCY)
Facility: CLINIC | Age: 87
Discharge: HOME | End: 2022-03-01
Attending: EMERGENCY MEDICINE | Admitting: EMERGENCY MEDICINE
Payer: COMMERCIAL

## 2022-03-01 VITALS
RESPIRATION RATE: 22 BRPM | SYSTOLIC BLOOD PRESSURE: 183 MMHG | DIASTOLIC BLOOD PRESSURE: 100 MMHG | HEART RATE: 82 BPM | OXYGEN SATURATION: 100 %

## 2022-03-01 DIAGNOSIS — R04.0 EPISTAXIS: ICD-10-CM

## 2022-03-01 PROCEDURE — 99284 EMERGENCY DEPT VISIT MOD MDM: CPT | Mod: 25

## 2022-03-01 PROCEDURE — 30901 CONTROL OF NOSEBLEED: CPT | Mod: RT

## 2022-03-01 RX ORDER — TRANEXAMIC ACID 100 MG/ML
500 INJECTION, SOLUTION INTRAVENOUS ONCE
Status: DISCONTINUED | OUTPATIENT
Start: 2022-03-01 | End: 2022-03-01 | Stop reason: HOSPADM

## 2022-03-01 RX ORDER — OXYMETAZOLINE HYDROCHLORIDE 0.05 G/100ML
2 SPRAY NASAL ONCE
Status: DISCONTINUED | OUTPATIENT
Start: 2022-03-01 | End: 2022-03-01 | Stop reason: HOSPADM

## 2022-03-01 NOTE — ED TRIAGE NOTES
Pt arrived from home via EMS for uncontrolled nose bleed. Started around 0600 this AM. No hx of clotting disorders or anticoagulant use. EMS was able to control bleeding with afrin and nose clamp. Was here 5 days ago for a similar occurrence.

## 2022-03-01 NOTE — ED PROVIDER NOTES
History     Chief Complaint:  Epistaxis       HPI:  Lynn Soriano is a 89 year old female who presents with epistaxis.  Patient reports she was awake this morning around 6 AM, sitting looking at the window when she developed spontaneous bleeding from her right nare.  She does have a history of recurrent epistaxis, and was seen in this ED approximately 1 week ago for the same.  She denies any facial trauma or falls.  She is not on anticoagulation.  She noted expectoration of blood and clots.  EMS was contacted, and arrived to the ED.  Patient denies pain coming from the left nare.    Allergies:  Anticoagulant Compound  Aspirin  Penicillins     Medications:    acetaminophen (TYLENOL) 325 MG tablet  Calcium Carbonate-Vitamin D 500-125 MG-UNIT TABS  docusate sodium (COLACE) 100 MG capsule  Multiple Vitamin (MULTIVITAMIN ADULT) TABS  potassium chloride ER (K-TAB/KLOR-CON) 10 MEQ CR tablet  sodium chloride (OCEAN) 0.65 % nasal spray        Past Medical History:    Past Medical History:   Diagnosis Date     Anemia      Closed wedge compression fracture of T10 vertebra (H) 2018     GERD (gastroesophageal reflux disease)      Herpes zoster      History of vertebroplasty 03/10/2018     Raynaud's syndrome      Recurrent epistaxis      Senile osteoporosis 2015     Patient Active Problem List    Diagnosis Date Noted     Other osteoporosis without current pathological fracture 06/29/2021     Priority: Medium     History of corticosteroid therapy 06/29/2021     Priority: Medium     Post-menopausal 06/29/2021     Priority: Medium     Gastroesophageal reflux disease without esophagitis 12/24/2019     Priority: Medium     Age-related osteoporosis without current pathological fracture 12/24/2019     Priority: Medium     History of compression fracture of spine 12/24/2019     Priority: Medium     Elevated BP without diagnosis of hypertension 12/24/2019     Priority: Medium     Overactive bladder 12/24/2019     Priority: Medium      Recent PHQ 2/9 Score    PHQ 2:  Date PHQ 2 Score   8/23/2018 3       PHQ 9:  Date PHQ 9 Score   8/23/2018 11         Most Recent QING 7 Score       Date QING 7 Score   8/23/2018 13     Health Maintenance Summary     Topic Due On Due Status Completed On    Colorectal Cancer Screening - Colonoscopy  Dec 7, 2018 Not Due Dec 7, 2015    IMMUNIZATION - DTaP/Tdap/Td Feb 15, 2026 Not Due Feb 15, 2016    Immunization-Influenza Sep 1, 2018 Not Due Oct 27, 2017    Hepatitis C Screening  Completed Jan 16, 2017          Patient is up to date, no discussion needed.           Raynaud's disease without gangrene 12/24/2019     Priority: Medium        Past Surgical History:    Past Surgical History:   Procedure Laterality Date     IR LUMBAR EPIDURAL STEROID INJECTION  8/4/2003     IR LUMBAR EPIDURAL STEROID INJECTION  1/28/2004     IR LUMBAR EPIDURAL STEROID INJECTION  9/14/2004     IR LUMBAR EPIDURAL STEROID INJECTION  9/14/2004     IR LUMBAR EPIDURAL STEROID INJECTION  9/6/2005     IR LUMBAR EPIDURAL STEROID INJECTION  9/6/2005     IR THORACIC VERTEBROPLASTY  3/20/2018     VERTEBROPLASTY          Family History:    family history includes Nephrolithiasis in her sister; Osteoporosis in her sister.    Social History:   reports that she has never smoked. She has never used smokeless tobacco. She reports current alcohol use. She reports that she does not use drugs.    Review of Systems:  10 point ROS is negative aside from that mentioned in the HPI      Physical Exam     Patient Vitals for the past 24 hrs:   BP Temp src Pulse Resp SpO2   03/01/22 0841 (!) 189/87 -- 82 -- 97 %   03/01/22 0751 (!) 170/83 Oral 87 22 96 %      General:   Well-nourished   Speaking in full sentences  Eyes:   Conjunctiva without injection or scleral icterus   PERRL   EOM full w/out entrapment or proptosis  ENT:   Moist mucous membranes   Posterior oropharynx clear without erythema or exudate   Evidence of blood in posterior oropharynx, though no active bleeding   Nares patent   Blood in R nare and hyperemia over anterior plexus   No discrete culprit vessel identified   No blood in L nare   Pinnae normal   No midface swelling, erythema, or asymmetry  Neck:   Full ROM   No stiffness appreciated  Resp:   Even, non-labored respirations  CV:    Normal rate, regular rhythm   S1 and S2 present   No murmur, gallop or rub  Skin:   Warm, dry, well perfused   No rashes or open wounds on exposed skin  MSK:   Moves all extremities   No focal deformities or swelling  Neuro:   Alert   Answers questions appropriately   Moves all  extremities equally   Gait stable  Psych:   Normal affect, normal mood      Emergency Department Course     Interventions:  Medications   oxymetazoline (AFRIN) 0.05 % spray 2 spray (has no administration in time range)   tranexamic acid (CYKLOKAPRON) spray 500 mg (has no administration in time range)   silver nitrate (ARZOL) Misc 1 applicator (has no administration in time range)        Emergency Department Course / Reassessment:  Nursing notes and vitals reviewed.  8:03 AM: I performed an exam of the patient as documented above.      ED Course as of 03/01/22 0920 Tue Mar 01, 2022   0811 Patient re-evaluated.  Afrin applied to Right Nare   0825 Patient re-evaluated.  No ongoing bleeding noted.  Topical TXA applied.   0849 Patient re-evaluated.  Silver nitrate stick used to cauterize visualized vessel.  Patient tolerated this without any difficulty.   0905 Patient re-assessed.  Will ambulate   0919 Patient re-evaluated.  Passed ambulation trial without any ongoing bleeding.  Will discharge home with ENT follow-up.        I discussed the treatment plan with the patient. They expressed understanding of this plan and consented to discharge. They will be discharged home with instructions for care and follow up. In addition, the patient will return to the emergency department if their symptoms persist, worsen, if new symptoms arise or if there is any concern.  All questions were answered.    I personally reviewed the imaging and laboratory results with the Patient and answered all related questions prior to discharge.        Impression & Plan      Medical Decision Making:  Lynn Soriano is a 89 year old female who presents to the ER for evaluation of epistaxis.  VS on presentation reveal elevated BP.  Physical examination is notable for blood from the right nare.  Possible etiologies of epistaxis include dry nasal mucosa, local trauma, nasal foreign body, allergic rhinitis, environmental irritants, underlying  coagulopathy, drug induced (i.e. Coumadin), intranasal neoplasia, among others.  At this time, I am most suspicious for anterior epistaxis, possibly in the setting of dry ambient environment coupled by friable baseline vasculature given history of recurrent epistaxis.      On evaluation, patient is not showing signs of hemorrhagic shock.  There is no evidence of airway compromise requiring emergent airway intervention.  Initially, direct pressure was applied for 15 minutes with a nasal clamp.  On re-examination, bleeding had not subsided.  Given ongoing bleeding, patient cleared blood from the anterior nare by blowing their nose.  Topical Afrin nasal spray was administered as well as topical TXA via pledget (200 mg).  On re-examination, the culprit vessel was visualized, and cautery using silver nitrate was performed.  Patient was observed without evidence of recurrent bleeding, and was ambulatory in the ED without rebleeding    Patient was observed in the emergency department, and was not noted to have recurrence of their epistaxis.  Posterior epistaxis was considered although presently felt to be unlikely in the absence of brisk bleeding, and above ED evaluation.  Patient has remained hemodynamically stable with no development of respiratory compromise.  In the absence of severe blood loss, severe coagulopathy, or suspicion for posterior epistaxis, patient is felt safe for discharge home with close outpatient follow-up.  Present social situation dictates that reliable follow-up is possible and reasonable.  Has just entered assisted living and has medic alert necklace. Referral information for follow-up with otolaryngology was provided.  Outpatient cares were discussed including avoidance of nose blowing for 12 hours, avoidance of local nasal trauma, humidified air, avoidance of heavy lifting, and ways to control bleeding should bleeding recur.  Strict return precautions were discussed including immediate return  with any recurrent bleeding not stopping with simple pinching at home, development of severe pain, dizziness/lightheadednss, syncope, shortness of breath, or any other new or troubling symptoms.  Patient felt comfortable with this plan of care and all questions were answered prior to discharge.    Diagnosis:    ICD-10-CM    1. Epistaxis  R04.0         3/1/2022   Roly Ott MD Roach, Brian Donald, MD  03/01/22 0920

## 2022-03-03 ENCOUNTER — TELEPHONE (OUTPATIENT)
Dept: PEDIATRICS | Facility: CLINIC | Age: 87
End: 2022-03-03
Payer: COMMERCIAL

## 2022-03-03 NOTE — TELEPHONE ENCOUNTER
Medication list updated in Epic to reflect requested changes.    Aviva Woodruff MD  Internal Medicine & Pediatrics  [facility]

## 2022-03-03 NOTE — TELEPHONE ENCOUNTER
"Alexandria-MELIZA Moncada, returned call and stated they would just need an updated med list faxed.    Calcium: the pharmacy sent Varinder Moncada a message stating \"the dose on the med list the long term care pharmacy cannot provide\". Alexandria recommended changing the dose to 500-200.    Liliana Shirley on 3/3/2022 at 1:20 PM    "

## 2022-03-03 NOTE — TELEPHONE ENCOUNTER
Ivonne, pt's daughter (no C2C) called requesting to update  pt's medications. Pt, Lynn, gave verbal consent to speak with daughter about medications and medical history.    Ivonne stated the med list that was sent to [facility] had incorrect information.     Tylenol: shouldn't be PRN. Daughter states pt takes two every day in the morning and possibly a PRN in the afternoon.     Colace: shouldn't be PRN. Pt takes once daily.    Calcium: not on med list at [facility], what we have is correct. Daughter will reach out to the nurse at [facility].    Liliana Shirley on 3/3/2022 at 1:12 PM

## 2022-03-03 NOTE — TELEPHONE ENCOUNTER
Writer spoke with Janeth Quiles, and obtained Varinder Moncada's Fax: [phone]. Janeth stated to update medications we would have to speak to Alexandria-RN: [phone]. Writer called and lvm on Alexandria's vm requesting what they would need to update these items.    Liliana Shirley on 3/3/2022 at 1:17 PM

## 2022-03-04 ENCOUNTER — TRANSFERRED RECORDS (OUTPATIENT)
Dept: HEALTH INFORMATION MANAGEMENT | Facility: CLINIC | Age: 87
End: 2022-03-04
Payer: COMMERCIAL

## 2022-03-04 NOTE — TELEPHONE ENCOUNTER
Call received from [facility] at [phone].    Provided verbal order to her regarding the Calcium-vitamin D dose/strength change made by .     She will fax the form for [phone]) to sign & fax back to authenticate the med dose/strength change.    Oxana RN  Patient Advocate Liason (PAL)  [facility]

## 2022-03-05 ENCOUNTER — TELEPHONE (OUTPATIENT)
Dept: PEDIATRICS | Facility: CLINIC | Age: 87
End: 2022-03-05
Payer: COMMERCIAL

## 2022-03-05 NOTE — TELEPHONE ENCOUNTER
Reason for Call: Request for an order or referral:    Order or referral being requested: prescription     Date needed: as soon as possible    Has the patient been seen by the PCP for this problem? YES    Additional comments: needing an order from the doctor symptoms of UTI pain urgency burning care giver at [facility]    Phone number Patient can be reached at:  Cell number on file:    Telephone Information:   Mobile [phone]       Best Time:  anytime    Can we leave a detailed message on this number?  YES    Call taken on 3/5/2022 at 3:13 PM by Radha Morrissey

## 2022-03-06 ENCOUNTER — NURSE TRIAGE (OUTPATIENT)
Dept: NURSING | Facility: CLINIC | Age: 87
End: 2022-03-06
Payer: COMMERCIAL

## 2022-03-06 ENCOUNTER — LAB REQUISITION (OUTPATIENT)
Dept: LAB | Facility: CLINIC | Age: 87
End: 2022-03-06
Payer: COMMERCIAL

## 2022-03-06 ENCOUNTER — TELEPHONE (OUTPATIENT)
Dept: PEDIATRICS | Facility: CLINIC | Age: 87
End: 2022-03-06
Payer: COMMERCIAL

## 2022-03-06 DIAGNOSIS — R30.0 DYSURIA: Primary | ICD-10-CM

## 2022-03-06 LAB
ALBUMIN UR-MCNC: 50 MG/DL
APPEARANCE UR: ABNORMAL
BACTERIA #/AREA URNS HPF: ABNORMAL /HPF
BILIRUB UR QL STRIP: NEGATIVE
COLOR UR AUTO: YELLOW
GLUCOSE UR STRIP-MCNC: NEGATIVE MG/DL
HGB UR QL STRIP: ABNORMAL
KETONES UR STRIP-MCNC: NEGATIVE MG/DL
LEUKOCYTE ESTERASE UR QL STRIP: ABNORMAL
MUCOUS THREADS #/AREA URNS LPF: PRESENT /LPF
NITRATE UR QL: NEGATIVE
PH UR STRIP: 6.5 [PH] (ref 5–7)
RBC URINE: 4 /HPF
SP GR UR STRIP: 1.01 (ref 1–1.03)
SQUAMOUS EPITHELIAL: 3 /HPF
UROBILINOGEN UR STRIP-MCNC: 2 MG/DL
WBC CLUMPS #/AREA URNS HPF: PRESENT /HPF
WBC URINE: 113 /HPF

## 2022-03-06 PROCEDURE — 81001 URINALYSIS AUTO W/SCOPE: CPT | Mod: ORL | Performed by: INTERNAL MEDICINE

## 2022-03-06 PROCEDURE — 87086 URINE CULTURE/COLONY COUNT: CPT | Mod: ORL | Performed by: INTERNAL MEDICINE

## 2022-03-06 RX ORDER — SULFAMETHOXAZOLE/TRIMETHOPRIM 800-160 MG
1 TABLET ORAL 2 TIMES DAILY
Qty: 6 TABLET | Refills: 0 | Status: SHIPPED | OUTPATIENT
Start: 2022-03-06 | End: 2022-03-09

## 2022-03-06 NOTE — TELEPHONE ENCOUNTER
Nurse Triage SBAR    Is this a 2nd Level Triage? YES, LICENSED PRACTITIONER REVIEW IS REQUIRED    Situation    : Urinary symptoms    Caller: Alexandria HAIDER - [facility]  Phone #: [phone]     Background    Urinary symptoms started yesterday  Had a fall last night, no injury, no neurologic concerns    AL RN asks for UA order or abx for UTI    Assessment     Dysuria  Incontinence, new onset  VS within normal limits except temperature, 100.4F. Tylenol was given at 7:30 AM.  Fatigue, pt still able to walk to the bathroom with staff assistance.    (See information below for more triage details.)    Recommendation   : Paging provider for recommendation    Protocol Recommended Disposition: Immediate office evaluation or PCP Triage    RN paged on call provider MD Aldo via smart web at 10:19 am. Dr. Carlson called back at 10:23 am:  Recommendations:  1) UA and UC   2) Bactrim DS 1 tab BID x 3 days   3) Call back if not improved in 3 days    Alert triggered for Bactrim DS - due to allergy/contraindication to anticoagulant compound. Chart review on 2/7 states that pt was prescribed Bactrim DS 1 tab BID x 3 days for E. Coli UTI. UA retested on 2/16 and negative for UTI after completing abx. Dr. Carlson aware of this alert and okay to override - pt tolerated this in the past. Pt not taking anticoagulant.    FNA called Alexandria HAIDER, advised to collect UA/UC sample first then start Bactrim DS while pending UA results. AL RN verbalized understanding of above instructions.    Queta Carballo RN/[place] Nurse Advisor

## 2022-03-06 NOTE — TELEPHONE ENCOUNTER
Nurse Triage SBAR    Is this a 2nd Level Triage? YES, LICENSED PRACTITIONER REVIEW IS REQUIRED    Situation    : Urinary symptoms    Caller: Alexandria HAIDER - [facility]  Phone #: [phone]     Background    Urinary symptoms started yesterday  Had a fall last night, no injury, no neurologic concerns    AL RN asks for UA order or abx for UTI    Assessment     Dysuria  Incontinence, new onset  VS within normal limits except temperature, 100.4F. Tylenol was given at 7:30 AM.  Fatigue, pt still able to walk to the bathroom with staff assistance.    (See information below for more triage details.)    Recommendation   : Paging provider for recommendation    Protocol Recommended Disposition: Immediate office evaluation or PCP Triage    RN paged on call provider MD Aldo via smart web at 10:19 am. Dr. Carlson called back at 10:23 am:  Recommendations:  1) UA and UC   2) Bactrim DS 1 tab BID x 3 days   3) Call back if not improved in 3 days    Alert triggered for Bactrim DS - due to allergy/contraindication to anticoagulant compound. Chart review on 2/7 states that pt was prescribed Bactrim DS 1 tab BID x 3 days for E. Coli UTI. UA retested on 2/16 and negative for UTI after completing abx. Dr. Carlson aware of this alert and okay to override - pt tolerated this in the past. Pt not taking anticoagulant.    FNA called Alexandria HAIDER, advised to collect UA/UC sample first then start Bactrim DS while pending UA results. AL RN verbalized understanding of above instructions.    Queta Carballo RN/[place] Nurse Advisor                      Reason for Disposition    Fever > 100.4 F (38.0 C)    Additional Information    Negative: Shock suspected (e.g., cold/pale/clammy skin, too weak to stand, low BP, rapid pulse)    Negative: Sounds like a life-threatening emergency to the triager    Negative: [1] Unable to urinate (or only a few drops) > 4 hours AND     [2] bladder feels very full (e.g., palpable bladder or strong urge to  urinate)    Negative: [1] Decreased urination and [2] drinking very little AND [2] dehydration suspected (e.g., dark urine, no urine > 12 hours, very dry mouth, very lightheaded)    Negative: Patient sounds very sick or weak to the triager    Protocols used: URINARY SYMPTOMS-A-AH

## 2022-03-07 NOTE — TELEPHONE ENCOUNTER
Per other encounters: On-call contacted, labs done, meds ordered.    Liliana Shirley on 3/7/2022 at 7:21 AM

## 2022-03-08 ENCOUNTER — TELEPHONE (OUTPATIENT)
Dept: PEDIATRICS | Facility: CLINIC | Age: 87
End: 2022-03-08
Payer: COMMERCIAL

## 2022-03-08 DIAGNOSIS — N39.0 URINARY TRACT INFECTION WITHOUT HEMATURIA, SITE UNSPECIFIED: Primary | ICD-10-CM

## 2022-03-08 LAB — BACTERIA UR CULT: ABNORMAL

## 2022-03-08 NOTE — TELEPHONE ENCOUNTER
Alexandria from [facility] called.     Over the weekend patient had a UA done which came back abnormal. She will fax UA/UC orders to station D station fax. She would like Dr. Smith to review the labs and extend or change patient's antibiotic. Patient was prescribed Bactrim DS for 3 days, today is day 3 and will be out of antibiotics by end of day.    FYI    Patient fell again within the last hour with no injuries.     Patient was in the kitchen and was trying to open the freezer which is below the fridge and fell over. Patient does not appear to have any symptoms of confusion    Requested PT and OT orders due for frequent falls. Approved requested orders, see below.   -------------------------------------------------------------------------    The Home Care/Assisted Living/Nursing Facility is calling regarding an established patient.  Has the patient seen Home Care in the past or is currently residing in Assisted Living or Nursing Facility? Yes.     Alexandria calling from [facility] requesting the following orders that are within the Home Care, Assisted Living or Nursing Home Eval and Treatment standing order and can be signed as standing order signature required by RN.    Preferred Call Back Number: [phone]    PT/OT/Speech Therapy: PT and OT to evaluate and treat.     Any additional Orders:  Any order requested not stated above are outside of the standing order and must be routed to a licensed practitioner for approval.    No    Writer has verified Requestor will send fax to have orders signed.    Brisa Rollins RN on 3/8/2022 at 12:47 PM

## 2022-03-08 NOTE — TELEPHONE ENCOUNTER
Staff from [facility] LM at 11:26 am that they have lab results to go over. Call back at [phone].     Called back to get details, not available, so LM to call us back.     Oxana, RN  Patient Advocate Liason (PAL)  [facility]

## 2022-03-09 ENCOUNTER — NURSE TRIAGE (OUTPATIENT)
Dept: NURSING | Facility: CLINIC | Age: 87
End: 2022-03-09
Payer: COMMERCIAL

## 2022-03-09 RX ORDER — CIPROFLOXACIN 250 MG/1
250 TABLET, FILM COATED ORAL 2 TIMES DAILY
Qty: 6 TABLET | Refills: 0 | Status: SHIPPED | OUTPATIENT
Start: 2022-03-09 | End: 2022-03-12

## 2022-03-09 NOTE — TELEPHONE ENCOUNTER
Labs and culture reviewed. Would recommend ciprofloxacin 250mg BID x3 days. Has previously tolerated this before per chart review. Will pend. Please call and confirm what pharmacy is and can send in .    Priscilla Smith MD  Internal Medicine-Pediatrics

## 2022-03-09 NOTE — TELEPHONE ENCOUNTER
Alexandria, staff RN calling to update that Lynn had a 3rd fall last night in bathroom. No injury or care needed. Also calling to check on potential antibiotic script. FNA relayed one was sent into  long term care pharmacy.   RN confirmed this was the correct location and will follow up with them.     Stefania Kong, RN   Nurse Advisor           Reason for Disposition    General information question, no triage required and triager able to answer question    Additional Information    Negative: [1] Caller is not with the adult (patient) AND [2] reporting urgent symptoms    Negative: Lab result questions    Negative: Medication questions    Negative: Caller can't be reached by phone    Negative: Caller has already spoken to PCP or another triager    Negative: RN needs further essential information from caller in order to complete triage    Negative: Requesting regular office appointment    Negative: [1] Caller requesting NON-URGENT health information AND [2] PCP's office is the best resource    Negative: Health Information question, no triage required and triager able to answer question    Protocols used: INFORMATION ONLY CALL - NO TRIAGE-A-

## 2022-03-09 NOTE — TELEPHONE ENCOUNTER
Called jeffery and reviewed. Will send to preferred pharmacy now that [facility] is in [facility], needs sent to [facility] pharmacy.

## 2022-03-09 NOTE — TELEPHONE ENCOUNTER
I believe plan was to start physical therapy/OT - is there a concern for something else causing more frequent falls that needs medical work-up? Can we call and get more information? Or are these more of mechanical falls?    Thanks!    Priscilla Smith MD  Internal Medicine-Pediatrics

## 2022-03-10 ENCOUNTER — TELEPHONE (OUTPATIENT)
Dept: PEDIATRICS | Facility: CLINIC | Age: 87
End: 2022-03-10
Payer: COMMERCIAL

## 2022-03-10 NOTE — TELEPHONE ENCOUNTER
Patient's assisted living returned call. Alexandria HAIDER states that Patient definitely seams weaker and may have some issues with cognition as well. She thinks that patient would benefit from being seen for an office visit. For appointment scheduling please contact son Chadwick.      Patient starts PT/OT on Monday. Patient is so weak at this point she is unable to walk to the dinning cad[employer] for meals. Patient is also currently requiring more services like bathroom assistance and dressing assistance which she has not needed in the past.     Patient has had 3 falls recently, one while reaching for the freezer, one in the bathroom where her legs gave out and one while getting up from bed.     Brisa Rollins RN on 3/10/2022 at 2:33 PM      Brisa Rollins RN on 3/10/2022 at 2:32 PM

## 2022-03-10 NOTE — TELEPHONE ENCOUNTER
MA/TREMAYNE-  Please assist in scheduling her with provider or extender in the next week. Juliana Hutton RN on 3/10/2022 at 4:27 PM

## 2022-03-10 NOTE — TELEPHONE ENCOUNTER
Homecare calling to request verbal orders to delay PT and OT until Monday as staff is not available.  Verbal ok given

## 2022-03-10 NOTE — TELEPHONE ENCOUNTER
I agree with her being seen in clinic. Not sure if we have something available/if I have any approval required spots in the next week? If not, perhaps we can have her see an extender?    Priscilla Smith MD  Internal Medicine-Pediatrics

## 2022-03-11 NOTE — TELEPHONE ENCOUNTER
[facility] for Chadwick to return call and schedule. Dr. Smith's schedule is full but she is with residents on Wednesday afternoon. Dr. Mabel Lr and Dr. Fabrice Elmore have a 1:40/2 appt on 3/16.     Liliana Shirley on 3/11/2022 at 12:06 PM

## 2022-03-14 ENCOUNTER — TELEPHONE (OUTPATIENT)
Dept: PEDIATRICS | Facility: CLINIC | Age: 87
End: 2022-03-14
Payer: COMMERCIAL

## 2022-03-14 NOTE — TELEPHONE ENCOUNTER
The Home Care/Assisted Living/Nursing Facility is calling regarding an established patient.  Has the patient seen Home Care in the past or is currently residing in Assisted Living or Nursing Facility? Yes.     Cynthia calling from [facility] requesting the following orders that are within the Home Care, Assisted Living or Nursing Home Eval and Treatment standing order and can be signed as standing order signature required by RN.    Preferred Call Back Number: [phone]    PT/OT/Speech Therapy      Needs 3 times a week for one week  2 time a week for 1 week  One time a week for 2 weeks    Any additional Orders:  Any order requested not stated above are outside of the standing order and must be routed to a licensed practitioner for approval.    No    Writer has verified Requestor will send fax to have orders signed.    Juliana Hutton RN on 3/14/2022 at 11:25 AM

## 2022-03-14 NOTE — TELEPHONE ENCOUNTER
3:27 pm 3/11/22    Son, nidhi Genao at station.    Writer called Chadwick againnidhi.    Liliana Shirley on 3/11/2022 at 4:00 PM

## 2022-03-18 ENCOUNTER — TELEPHONE (OUTPATIENT)
Dept: PEDIATRICS | Facility: CLINIC | Age: 87
End: 2022-03-18
Payer: COMMERCIAL

## 2022-03-18 NOTE — TELEPHONE ENCOUNTER
The Home Care/Assisted Living/Nursing Facility is calling regarding an established patient.  Has the patient seen Home Care in the past or is currently residing in Assisted Living or Nursing Facility? Yes.     Rozina Quick(OT) calling from [facility] requesting the following orders that are within the Home Care, Assisted Living or Nursing Home Eval and Treatment standing order and can be signed as standing order signature required by RN.    Preferred Call Back Number: [phone]    PT/OT/Speech Therapy    Any additional Orders:  Any order requested not stated above are outside of the standing order and must be routed to a licensed practitioner for approval.    No    Writer has verified Requestor will send fax to have orders signed.    Oxana RN  Patient Advocate Liason (PAL)  [facility]

## 2022-03-18 NOTE — TELEPHONE ENCOUNTER
8:23 am 3/18/22 Voicemail    Rozina Quick, OT [facility] [phone] called stating she did an OT Evaluation 3/16/22 and is requesting verbal orders for OT 2x per week for 2 weeks and 1x per week for one week for ADL tolerance. We can leave a voicemail on her confidential voicemail.    Liliana hSirley on 3/18/2022 at 9:15 AM

## 2022-03-23 ENCOUNTER — MEDICAL CORRESPONDENCE (OUTPATIENT)
Dept: HEALTH INFORMATION MANAGEMENT | Facility: CLINIC | Age: 87
End: 2022-03-23
Payer: COMMERCIAL

## 2022-03-24 ENCOUNTER — TELEPHONE (OUTPATIENT)
Dept: PEDIATRICS | Facility: CLINIC | Age: 87
End: 2022-03-24
Payer: COMMERCIAL

## 2022-03-24 DIAGNOSIS — K59.01 SLOW TRANSIT CONSTIPATION: ICD-10-CM

## 2022-03-24 DIAGNOSIS — R04.0 EPISTAXIS: Primary | ICD-10-CM

## 2022-03-24 RX ORDER — OXYMETAZOLINE HYDROCHLORIDE 0.05 G/100ML
SPRAY NASAL
Start: 2022-03-24 | End: 2023-01-01

## 2022-03-24 RX ORDER — SENNOSIDES 8.6 MG
1 TABLET ORAL DAILY PRN
Start: 2022-03-24 | End: 2022-04-01

## 2022-03-24 NOTE — TELEPHONE ENCOUNTER
Ghazal duran [facility] called ([phone]) stating patient is still having issues with her bowel movements and would like a secondary stool softener added, possibly Senokot.     Patient also has a history of epistaxis and caller would like a PRN of Afrin ordered.     Please fax orders to [phone].    Liliana Shirley on 3/24/2022 at 8:40 AM

## 2022-03-28 RX ORDER — ACETAMINOPHEN 325 MG/1
650 TABLET ORAL EVERY MORNING
COMMUNITY
Start: 2022-03-28 | End: 2022-04-28

## 2022-04-01 ENCOUNTER — TELEPHONE (OUTPATIENT)
Dept: PEDIATRICS | Facility: CLINIC | Age: 87
End: 2022-04-01
Payer: COMMERCIAL

## 2022-04-01 DIAGNOSIS — K59.01 SLOW TRANSIT CONSTIPATION: ICD-10-CM

## 2022-04-01 RX ORDER — SENNOSIDES 8.6 MG
1 TABLET ORAL DAILY
Start: 2022-04-01 | End: 2022-09-01

## 2022-04-01 NOTE — TELEPHONE ENCOUNTER
Nurse, Varinder Spence, called stating pt has been having hard stools again and has been manually removing them by herself. Jordy stated pt is not consistent on requesting for PRNs and would like pt to have another scheduled stool softener such as her Senokot or Dulcolax.     Liliana Shirley on 4/1/2022 at 3:58 PM

## 2022-04-02 NOTE — TELEPHONE ENCOUNTER
Prescription for senokot changed to daily from prn. Can we call nurse back with update and send new prescription list to patient's facility?    Priscilla Smith MD  Internal Medicine-Pediatrics

## 2022-04-04 ENCOUNTER — MEDICAL CORRESPONDENCE (OUTPATIENT)
Dept: HEALTH INFORMATION MANAGEMENT | Facility: CLINIC | Age: 87
End: 2022-04-04
Payer: COMMERCIAL

## 2022-04-05 ENCOUNTER — TELEPHONE (OUTPATIENT)
Dept: PEDIATRICS | Facility: CLINIC | Age: 87
End: 2022-04-05
Payer: COMMERCIAL

## 2022-04-05 DIAGNOSIS — M81.0 AGE-RELATED OSTEOPOROSIS WITHOUT CURRENT PATHOLOGICAL FRACTURE: Primary | ICD-10-CM

## 2022-04-05 NOTE — TELEPHONE ENCOUNTER
Called Virginia & provided verbal order as well. No other questions from [place].    Oxana, RN  Patient Advocate Liason (PAL)  [facility]

## 2022-04-05 NOTE — TELEPHONE ENCOUNTER
Virginia from [facility] requested Senna orders with providers signature. Virginia stated she could also take verbal orders. Writer faxed signed orders over.    Liliana Shirley on 4/5/2022 at 2:20 PM

## 2022-04-06 DIAGNOSIS — Z53.9 DIAGNOSIS NOT YET DEFINED: Primary | ICD-10-CM

## 2022-04-06 PROCEDURE — G0180 MD CERTIFICATION HHA PATIENT: HCPCS | Performed by: INTERNAL MEDICINE

## 2022-04-06 RX ORDER — MULTIVITAMIN WITH FOLIC ACID 400 MCG
TABLET ORAL
Qty: 31 TABLET | Refills: 97 | Status: SHIPPED | OUTPATIENT
Start: 2022-04-06 | End: 2023-01-01

## 2022-04-06 NOTE — TELEPHONE ENCOUNTER
Prescription approved per [facility].  Anju Chaudhry RN

## 2022-04-18 ENCOUNTER — NURSE TRIAGE (OUTPATIENT)
Dept: NURSING | Facility: CLINIC | Age: 87
End: 2022-04-18
Payer: COMMERCIAL

## 2022-04-18 NOTE — TELEPHONE ENCOUNTER
This all sounds good, I don't think any further intervention is indicated at this time unless her symptoms worsen.    Prsicilla Smith MD  Internal Medicine-Pediatrics

## 2022-04-18 NOTE — TELEPHONE ENCOUNTER
They will call back with further questions or concerns. Juliana Hutton RN on 4/18/2022 at 4:04 PM

## 2022-04-18 NOTE — TELEPHONE ENCOUNTER
Spoke to nurse Harman at [facility] ([phone]) . 195/83, and 139/72 an hour later.     No symptoms.     She has an ache in her hip/buttocks that she has had for years. Nurse reminded her of tylenol and she has taken that. She says the pain goes away with walking.     Juliana Hutton RN on 4/18/2022 at 3:56 PM

## 2022-04-18 NOTE — TELEPHONE ENCOUNTER
Can we reach out to patient or her nursing facility to get more information as to her BP and her hip/buttock pain?    Thanks!  Priscilla Smith MD  Internal Medicine-Pediatrics

## 2022-04-18 NOTE — TELEPHONE ENCOUNTER
Daughter, Mary Jane, eusebio. Is not currently with patient and is not on consent to communicate.     Patient has been complaining of pain in right hip/buttocks. No pain when laying down, but painful when walking or standing up from sitting position.    Daughter also stated that patient's blood pressure was 195/83 this morning. RN/Writer advised having nurse at assisted living facility recheck and follow-up with daughter on plan.    Will route message to provider to inform, but advised daughter call back when she is with patient for triage.      Tiny Macedo RN  04/18/22 12:38 PM  [facility] Nurse Advisor

## 2022-04-18 NOTE — TELEPHONE ENCOUNTER
Additional Information    Nursing judgment    Protocols used: INFORMATION ONLY CALL - NO TRIAGE-A-[place]

## 2022-04-27 DIAGNOSIS — Z86.39 HISTORY OF LOW POTASSIUM: ICD-10-CM

## 2022-04-27 DIAGNOSIS — K59.01 SLOW TRANSIT CONSTIPATION: Primary | ICD-10-CM

## 2022-04-27 DIAGNOSIS — M54.41 ACUTE RIGHT-SIDED LOW BACK PAIN WITH RIGHT-SIDED SCIATICA: ICD-10-CM

## 2022-04-28 RX ORDER — DOCUSATE SODIUM 100 MG/1
TABLET ORAL
Qty: 31 TABLET | Refills: 97 | Status: SHIPPED | OUTPATIENT
Start: 2022-04-28 | End: 2022-09-01

## 2022-04-28 RX ORDER — POTASSIUM CHLORIDE 750 MG/1
TABLET, EXTENDED RELEASE ORAL
Qty: 31 TABLET | Refills: 97 | Status: SHIPPED | OUTPATIENT
Start: 2022-04-28 | End: 2022-09-01

## 2022-04-28 RX ORDER — ACETAMINOPHEN 325 MG/1
TABLET ORAL
Qty: 93 TABLET | Refills: 97 | Status: SHIPPED | OUTPATIENT
Start: 2022-04-28 | End: 2023-01-01

## 2022-05-06 ENCOUNTER — HOSPITAL ENCOUNTER (EMERGENCY)
Facility: CLINIC | Age: 87
Discharge: HOME | End: 2022-05-06
Attending: EMERGENCY MEDICINE | Admitting: EMERGENCY MEDICINE
Payer: COMMERCIAL

## 2022-05-06 ENCOUNTER — APPOINTMENT (OUTPATIENT)
Dept: CT IMAGING | Facility: CLINIC | Age: 87
End: 2022-05-06
Attending: EMERGENCY MEDICINE
Payer: COMMERCIAL

## 2022-05-06 VITALS
DIASTOLIC BLOOD PRESSURE: 82 MMHG | SYSTOLIC BLOOD PRESSURE: 124 MMHG | OXYGEN SATURATION: 99 % | HEART RATE: 91 BPM | RESPIRATION RATE: 12 BRPM | TEMPERATURE: 97.9 F

## 2022-05-06 DIAGNOSIS — N30.00 ACUTE CYSTITIS WITHOUT HEMATURIA: ICD-10-CM

## 2022-05-06 DIAGNOSIS — I10 BENIGN ESSENTIAL HYPERTENSION: ICD-10-CM

## 2022-05-06 LAB
ALBUMIN UR-MCNC: NEGATIVE MG/DL
ANION GAP SERPL CALCULATED.3IONS-SCNC: 7 MMOL/L (ref 3–14)
APPEARANCE UR: CLEAR
BACTERIA #/AREA URNS HPF: ABNORMAL /HPF
BILIRUB UR QL STRIP: NEGATIVE
BUN SERPL-MCNC: 17 MG/DL (ref 7–30)
CALCIUM SERPL-MCNC: 8.9 MG/DL (ref 8.5–10.1)
CHLORIDE BLD-SCNC: 106 MMOL/L (ref 94–109)
CO2 SERPL-SCNC: 28 MMOL/L (ref 20–32)
COLOR UR AUTO: ABNORMAL
CREAT SERPL-MCNC: 0.52 MG/DL (ref 0.52–1.04)
GFR SERPL CREATININE-BSD FRML MDRD: 86 ML/MIN/1.73M2
GLUCOSE BLD-MCNC: 153 MG/DL (ref 70–99)
GLUCOSE UR STRIP-MCNC: NEGATIVE MG/DL
HGB UR QL STRIP: NEGATIVE
KETONES UR STRIP-MCNC: NEGATIVE MG/DL
LEUKOCYTE ESTERASE UR QL STRIP: NEGATIVE
MUCOUS THREADS #/AREA URNS LPF: PRESENT /LPF
NITRATE UR QL: POSITIVE
PH UR STRIP: 7 [PH] (ref 5–7)
POTASSIUM BLD-SCNC: 3.7 MMOL/L (ref 3.4–5.3)
RBC URINE: <1 /HPF
SODIUM SERPL-SCNC: 141 MMOL/L (ref 133–144)
SP GR UR STRIP: 1.01 (ref 1–1.03)
UROBILINOGEN UR STRIP-MCNC: NORMAL MG/DL
WBC URINE: 1 /HPF

## 2022-05-06 PROCEDURE — 80048 BASIC METABOLIC PNL TOTAL CA: CPT | Performed by: EMERGENCY MEDICINE

## 2022-05-06 PROCEDURE — 99284 EMERGENCY DEPT VISIT MOD MDM: CPT | Mod: 25

## 2022-05-06 PROCEDURE — 81001 URINALYSIS AUTO W/SCOPE: CPT | Performed by: EMERGENCY MEDICINE

## 2022-05-06 PROCEDURE — 87086 URINE CULTURE/COLONY COUNT: CPT | Performed by: EMERGENCY MEDICINE

## 2022-05-06 PROCEDURE — 70450 CT HEAD/BRAIN W/O DYE: CPT

## 2022-05-06 PROCEDURE — 36415 COLL VENOUS BLD VENIPUNCTURE: CPT | Performed by: EMERGENCY MEDICINE

## 2022-05-06 RX ORDER — CEPHALEXIN 500 MG/1
500 CAPSULE ORAL 2 TIMES DAILY
Qty: 14 CAPSULE | Refills: 0 | Status: SHIPPED | OUTPATIENT
Start: 2022-05-06 | End: 2022-05-13

## 2022-05-06 RX ORDER — AMLODIPINE BESYLATE 5 MG/1
2.5 TABLET ORAL DAILY
Qty: 30 TABLET | Refills: 0 | Status: SHIPPED | OUTPATIENT
Start: 2022-05-06 | End: 2022-05-24

## 2022-05-06 NOTE — DISCHARGE INSTRUCTIONS
You should take your antibiotics as prescribed for your urinary tract infection.  She develop severe abdominal pain, back pain, high fever be unable take antibiotics you should return to the emergency department.  With respect to your headaches, and heat pack to the back and Tylenol is reasonable.  With respect to elevated blood pressure, you should check your blood pressure once in the morning and once in the evening and write these numbers down and touch base with your primary doctor over the next few days.  If you are persistently running with the top number in the 140s to 150s you could try taking the 2.5 mg of Norvasc.  If you do not have a severe headache, new chest pain back pain or belly pain and you have elevated numbers, you do not need to be seen emergently but I would touch base with your primary doctor as you will likely  need to be put on more blood pressure medication.

## 2022-05-06 NOTE — ED TRIAGE NOTES
Pt presents to ED for evaluation of urinary frequency which started within the last 12 hours; and for hypertension.  Pt was evaluated by a staff member at her assisted living facility and her blood pressure was above 200 systolic.  Pt denies fevers.      Triage Assessment     Row Name 05/06/22 1204       Triage Assessment (Adult)    Airway WDL WDL       Respiratory WDL    Respiratory WDL WDL       Skin Circulation/Temperature WDL    Skin Circulation/Temperature WDL WDL       Cardiac WDL    Cardiac WDL WDL       Peripheral/Neurovascular WDL    Peripheral Neurovascular WDL WDL       Cognitive/Neuro/Behavioral WDL    Cognitive/Neuro/Behavioral WDL WDL

## 2022-05-06 NOTE — ED PROVIDER NOTES
History     Chief Complaint:  Hypertension and Urinary Frequency       HPI   Lynn Soriano is a 89 year old female presenting for evaluation of urinary urgency going 5 times this morning, does have hx of urinary incontinence.  She denies any fevers or chills, denies any flank pain, she has had symptoms were 3.  She denies any recent antibiotics.  She was also noted to have elevated blood pressure at her assisted living facility however denies any chest pain, chest pressure or shortness of breath, she does have a history of elevated blood pressure but does not take any medications.  She denies any neck pain, or flank pain.    ROS:  Review of Systems  10 point ROS completed, negative except as indicated in the HPI.       Allergies:  Anticoagulant Compound  Aspirin  Penicillins     Medications:    acetaminophen (TYLENOL) 325 MG tablet  calcium carbonate-vitamin D (OS-MIKE WITH D) 500-200 MG-UNIT tablet  docusate sodium (COLACE) 100 MG capsule  Multiple Vitamin (TAB-A-JULIA) TABS  oxymetazoline (AFRIN) 0.05 % nasal spray  potassium chloride ER (K-TAB/KLOR-CON) 10 MEQ CR tablet  sennosides (SENOKOT) 8.6 MG tablet  sodium chloride (OCEAN) 0.65 % nasal spray  STOOL SOFTENER 100 MG tablet        Past Medical History:    Past Medical History:   Diagnosis Date     Anemia      Closed wedge compression fracture of T10 vertebra (H) 2018     GERD (gastroesophageal reflux disease)      Herpes zoster      History of vertebroplasty 03/10/2018     Raynaud's syndrome      Recurrent epistaxis      Senile osteoporosis 2015     Patient Active Problem List   Diagnosis     Gastroesophageal reflux disease without esophagitis     Age-related osteoporosis without current pathological fracture     History of compression fracture of spine     Elevated BP without diagnosis of hypertension     Overactive bladder     Raynaud's disease without gangrene     Other osteoporosis without current pathological fracture     History of corticosteroid  therapy     Post-menopausal        Past Surgical History:    Past Surgical History:   Procedure Laterality Date     IR LUMBAR EPIDURAL STEROID INJECTION  8/4/2003     IR LUMBAR EPIDURAL STEROID INJECTION  1/28/2004     IR LUMBAR EPIDURAL STEROID INJECTION  9/14/2004     IR LUMBAR EPIDURAL STEROID INJECTION  9/14/2004     IR LUMBAR EPIDURAL STEROID INJECTION  9/6/2005     IR LUMBAR EPIDURAL STEROID INJECTION  9/6/2005     IR THORACIC VERTEBROPLASTY  3/20/2018     VERTEBROPLASTY          Family History:    family history includes Nephrolithiasis in her sister; Osteoporosis in her sister.    Social History:   reports that she has never smoked. She has never used smokeless tobacco. She reports current alcohol use. She reports that she does not use drugs.  PCP: Priscilla Smith     Physical Exam     Patient Vitals for the past 24 hrs:   BP Temp Temp src Pulse Resp SpO2   05/06/22 1204 (!) 183/89 97.9  F (36.6  C) Temporal 88 12 100 %        Physical Exam  Constitutional: Alert, attentive, GCS 15   HENT:    Nose: Nose normal.   Eyes: EOM are normal, anicteric, conjugate gaze  CV: regular rate and rhythm; no murmurs  Chest: Effort normal and breath sounds clear without wheezing or rales, symmetric bilaterally   GI:  non tender. No distension. No guarding or rebound.    MSK: No LE edema, no tenderness to palpation of BLE.  Neurological: Alert, attentive, moving all extremities equally.   Skin: Skin is warm and dry.      Emergency Department Course   Laboratory:  Labs Ordered and Resulted from Time of ED Arrival to Time of ED Departure   ROUTINE UA WITH MICROSCOPIC REFLEX TO CULTURE - Abnormal       Result Value    Color Urine Light Yellow      Appearance Urine Clear      Glucose Urine Negative      Bilirubin Urine Negative      Ketones Urine Negative      Specific Gravity Urine 1.012      Blood Urine Negative      pH Urine 7.0      Protein Albumin Urine Negative      Urobilinogen Urine Normal      Nitrite Urine Positive (*)      Leukocyte Esterase Urine Negative      Bacteria Urine Moderate (*)     Mucus Urine Present (*)     RBC Urine <1      WBC Urine 1     BASIC METABOLIC PANEL - Abnormal    Sodium 141      Potassium 3.7      Chloride 106      Carbon Dioxide (CO2) 28      Anion Gap 7      Urea Nitrogen 17      Creatinine 0.52      Calcium 8.9      Glucose 153 (*)     GFR Estimate 86     URINE CULTURE               Disposition:  The patient was discharged to home.     Impression & Plan    Medical Decision Makin-year-old woman past medical history significant for urinary incontinence, presenting from her assisted living facility for urinary frequency going several times a day without associated fever or dysuria as well as elevated blood pressure.  She was noted to have systolic blood pressures in the 200s there however denies any chest pain, chest pressure or shortness of breath, she denies any flank pain.  There is some mention of headaches that started when she moved into her new assisted living facility, they are intermittent, not clearly associate with elevated BP.  Given new onset headache that get head CT although however this was negative.  Low suspicion for intracranial hemorrhage, no clear mass on CT.  I have recommended PCP follow-up, ice packs to the back of the neck as she has a history of cervical degeneration and Tylenol.  In looking back to her ER visits, she has had relatively high blood pressures at least dating back to 2022 and was diagnosed with HTN on clinic visit in  ([facility]), but not started on medications.  Given no focal deficits and no clear systemic symptoms relative blood pressure, I do not think she warrants advanced imaging or additional blood work.  Her UA is consistent with UTI, I will initiate her on antibiotics, I will start her on low-dose blood pressure medication and recommended PCP follow-up.    Diagnosis:    ICD-10-CM    1. Acute cystitis without hematuria  N30.00    2. Benign essential  hypertension  I10         Discharge Medications:  New Prescriptions    AMLODIPINE (NORVASC) 5 MG TABLET    Take 0.5 tablets (2.5 mg) by mouth daily for 60 doses    CEPHALEXIN (KEFLEX) 500 MG CAPSULE    Take 1 capsule (500 mg) by mouth 2 times daily for 7 days        Rene Bentley MD  [employer]  3:57 PM 05/06/22          Rene Bentley MD  05/06/22 9450

## 2022-05-07 LAB — BACTERIA UR CULT: ABNORMAL

## 2022-05-08 NOTE — RESULT ENCOUNTER NOTE
Final Urine Culture Report on 5/7/22  [facility] Dept discharge antibiotic prescribed: Cephalexin (Keflex) 500 mg capsule, 1 capsule (500 mg) by mouth 2 times daily for 7 days.  #1. Bacteria, >100,000 CFU/mL Escherichia coli , is SUSCEPTIBLE to Antibiotic.    No change in treatment per [facility] ED lab result Urine Culture protocol.

## 2022-05-10 ENCOUNTER — VIRTUAL VISIT (OUTPATIENT)
Dept: PEDIATRICS | Facility: CLINIC | Age: 87
End: 2022-05-10
Payer: COMMERCIAL

## 2022-05-10 DIAGNOSIS — I10 BENIGN ESSENTIAL HYPERTENSION: ICD-10-CM

## 2022-05-10 DIAGNOSIS — N95.2 ATROPHIC VAGINITIS: Primary | ICD-10-CM

## 2022-05-10 DIAGNOSIS — N39.0 RECURRENT UTI: ICD-10-CM

## 2022-05-10 DIAGNOSIS — M79.18 GLUTEAL PAIN: ICD-10-CM

## 2022-05-10 PROCEDURE — 99214 OFFICE O/P EST MOD 30 MIN: CPT | Mod: 95 | Performed by: INTERNAL MEDICINE

## 2022-05-10 NOTE — PROGRESS NOTES
Lynn is a 89 year old who is being evaluated via a billable video visit.      Video Start Time: 1:08 PM    Assessment & Plan       ICD-10-CM    1. Atrophic vaginitis  N95.2 conjugated estrogens (PREMARIN) 0.625 MG/GM vaginal cream   2. Recurrent UTI  N39.0    3. Gluteal pain  M79.18 Home Care Referral   4. Benign essential hypertension  I10       Recurrent UTI - 3 this year so far. Likely related to atrophic vaginitis.  Reviewed options. Trial of estrogen cream.  Consider daily antibiotic if UTI continue at a high frequency.    Gluteal pain. Unclear cause but clinically myofascial.  She would benefit from physical therapy.  She is unable to leave her home on her own. Will request home PT visits.    HTN. Not taking amlodipine regularly since prescribed in the ED last week.  BP is intermittently high.  Advised starting daily.  OK to change BP check to 3 times per week for the next few weeks. Once BP is better controlled then at least once per month.      Hill Anand MD  [facility]    Walter Bailey is a 89 year old who presents for the following health issues  accompanied by her daughter and niece.    HPI     ED visit last week.    UTI dx. E coli. Pan-sensitive. No sx at this time. Was having urgency prior to dx.  Finishing cephalexin.       In the ED BP was high.   Prescribed amlodipine 2.5 mg daily.  Took a dose Saturday night and again today.  Checking BP twice daily.  Fleming 122/75, 145/75  Mo 169/89, 152/93  Tu 173/97, 155/90    Right gluteal pain  Ongoing for the past few months. No known inciting cause.  Pain is localized to the area. No radicular symptoms.   Pain can prohibit her from doing activities. Worse if sitting for longer periods of time.    Has chronic Raynaud's symptoms. Reviewed that amlodipine may help alleviate symptoms.         Objective           Vitals:  No vitals were obtained today due to virtual visit.    Physical Exam   GEN: No distress  SKIN: No visible  rashes  LUNGS: No active cough, wheezing.   PSYCH: Normal affect. Well groomed.                   Documentation of Face-to-Face and Certification for Home Health Services     Patient: Lynn Soriano   YOB: 1929  MR Number: [id number]  Today's Date: 5/10/2022    I certify that patient: Lynn Soriano is under my care and that I, or a nurse practitioner or physician's assistant working with me, had a face-to-face encounter that meets the physician face-to-face encounter requirements with this patient on: 5/10/2022.    This encounter with the patient was in whole, or in part, for the following medical condition, which is the primary reason for home health care: gluteal pain.    I certify that, based on my findings, the following services are medically necessary home health services: Physical Therapy.    My clinical findings support the need for the above services because: Physical Therapy Services are needed to assess and treat the following functional impairments: gluteal pain, persistent.    Further, I certify that my clinical findings support that this patient is homebound (i.e. absences from home require considerable and taxing effort and are for medical reasons or [employer] services or infrequently or of short duration when for other reasons) because: Requires assistance of another person or specialized equipment to access medical services because patient: Requires supervision of another for safe transfer...    Based on the above findings. I certify that this patient is confined to the home and needs intermittent skilled nursing care, physical therapy and/or speech therapy.  The patient is under my care, and I have initiated the establishment of the plan of care.  This patient will be followed by a physician who will periodically review the plan of care.  Physician/Provider to provide follow up care: Priscilla Smith    Responsible Medicare certified PECOS Physician: Hill Anand MD   Physician  Signature: See electronic signature associated with these discharge orders.  Date: 5/10/2022    Video-Visit Details    Type of service:  Video Visit    Video End Time:1:45 PM    Originating Location (pt. Location): Home    Distant Location (provider location):  [facility]     Platform used for Video Visit: [employer]

## 2022-05-12 ENCOUNTER — TELEPHONE (OUTPATIENT)
Dept: PEDIATRICS | Facility: CLINIC | Age: 87
End: 2022-05-12
Payer: COMMERCIAL

## 2022-05-12 NOTE — TELEPHONE ENCOUNTER
Kiko from [facility] calling to inform that they can not take referral for pt at this time due to capacity.

## 2022-05-17 NOTE — TELEPHONE ENCOUNTER
"Called and spoke to Joaquín with [facility]  Joaquín: [phone] ext [phone]    She stated if patient's primary insurance is medicare then she should not have been denied. She is also located in one of \"their [facility]\" and will see if she can have patient pushed through due to her location.    She is going to call [facility] back directly with clarification on what they can do.  ____________________________________________    Received a call back from Joaquín with [facility]. They are able to accept patient for PHYSICAL THERAPY. They will start her sometime this week.    Lupe MONTERO RN, BSN    "

## 2022-05-19 ENCOUNTER — MEDICAL CORRESPONDENCE (OUTPATIENT)
Dept: HEALTH INFORMATION MANAGEMENT | Facility: CLINIC | Age: 87
End: 2022-05-19
Payer: COMMERCIAL

## 2022-05-20 ENCOUNTER — TELEPHONE (OUTPATIENT)
Dept: PEDIATRICS | Facility: CLINIC | Age: 87
End: 2022-05-20
Payer: COMMERCIAL

## 2022-05-20 NOTE — TELEPHONE ENCOUNTER
"Called and spoke to pt's son, Chadwick.    Son faxed and provided BP readings:  05/09/22 - 169/89, 152/93  05/10/22 - 173/97, 183/88  05/11/22 - 173/98, 154/85  05/12/22 - 167/96, 151/83  05/13/22 - 171/89, 141/77  05/14/22 - 147/90, 141/85  05/15/22 - 142/86, 134/82  05/16/22 - 163/93  05/17/22 - 184/96, 171/84  05/18/22 - 144/89, 170/90  05/19/22 - 148/83    Denied new or changing symptoms. Pt still taking half tab of amlodipine as prescribed (2.5 mg) daily.    ---------------------------------------------------------------    Reviewed messaging about vaginal cream versus suppository. Pt has difficulty apply/inserting themselves due to hand pain (pt not as dexterous as able to perform task alone). Reviewed oral option has risks of blood clots and stroke. Son agreed to speak with family and patient if suppository is a better option or not and will reach out if the pt would prefer method.      - Mac \"Tomas\" Mony (he/him/his), RN - Patient Advocate Liason (PAL)  [facility]    "

## 2022-05-20 NOTE — TELEPHONE ENCOUNTER
Blood pressure readings provided and entered into chart.    Liliana Shirley on 5/20/2022 at 8:40 AM

## 2022-05-20 NOTE — TELEPHONE ENCOUNTER
Verbal order provided to delay start of care until Thursday May 26th per family request    Thank you  Mabel Ying RN on 5/20/2022 at 4:38 PM

## 2022-05-20 NOTE — TELEPHONE ENCOUNTER
I have a hard time understanding BP readings that were entered into patient's chart. It looks like there are 4 readings from 5-6-22 but nothing recently. Can we please clarify?    Unfortunately there is not an oral option for the vaginal cream. There is a suppository (tablet) that she could use instead of the cream, but it would need to be placed vaginally as well.    Priscilla Smith MD  Internal Medicine-Pediatrics

## 2022-05-20 NOTE — TELEPHONE ENCOUNTER
Son called and is requesting some advice in regards to his mom's bp readings; as well as a f/u regarding vaginal cream that was prescribed & pt doesn't want to use it, is there an oral option?  Cyndi Yang MA on 5/20/2022 at 11:12 AM

## 2022-05-21 NOTE — TELEPHONE ENCOUNTER
Thanks! This is very helpful. I would agree that BPs appear to be trending higher than I would like. Can we have patient increase amlodipine to 5mg daily and continue to follow BPs? Can we follow-up with her or son in 2 weeks for updated BPs?    Fine to send in new prescription if needed.    Thanks!    Priscilla Smith MD  Internal Medicine-Pediatrics

## 2022-05-23 ENCOUNTER — TELEPHONE (OUTPATIENT)
Dept: PEDIATRICS | Facility: CLINIC | Age: 87
End: 2022-05-23
Payer: COMMERCIAL

## 2022-05-23 NOTE — TELEPHONE ENCOUNTER
[facility] is calling to obtain verbal orders for delay start of care for Thursday, May 26th per patient request.     Please call Chrissy at [phone], this is a confidential voicemail.    Liliana Shirley on 5/23/2022 at 3:30 PM

## 2022-05-23 NOTE — TELEPHONE ENCOUNTER
Called HC nurse, Chrissy, back & provided verbal ok to start HC on 5/26 because of pt is unavailable.    Oxana RN  Patient Advocate Liason (PAL)  [facility]

## 2022-05-23 NOTE — TELEPHONE ENCOUNTER
Called son(Kurt) at [phone], not available, so LM to call me back at [phone]. Will await for his call back.    Oxana RN  Patient Advocate Liason (PAL)  [facility]

## 2022-05-24 RX ORDER — AMLODIPINE BESYLATE 5 MG/1
5 TABLET ORAL DAILY
Qty: 30 TABLET | Refills: 0 | COMMUNITY
Start: 2022-05-24 | End: 2022-06-02

## 2022-05-24 NOTE — TELEPHONE ENCOUNTER
Son(Kurt) calling back. Went over the recommendation from . Son agrees to the plan. Doesn't need refill at this time. With any new or worsening sx's, he will let us know.     Updated med list. Will f/u with son in about 2 weeks to get the BP readings.     Oxana RN  Patient Advocate Liason (PAL)  [facility]

## 2022-05-26 ENCOUNTER — MEDICAL CORRESPONDENCE (OUTPATIENT)
Dept: HEALTH INFORMATION MANAGEMENT | Facility: CLINIC | Age: 87
End: 2022-05-26

## 2022-05-27 ENCOUNTER — TELEPHONE (OUTPATIENT)
Dept: PEDIATRICS | Facility: CLINIC | Age: 87
End: 2022-05-27
Payer: COMMERCIAL

## 2022-05-27 RX ORDER — AMLODIPINE BESYLATE 5 MG/1
5 TABLET ORAL DAILY
Qty: 30 TABLET | Refills: 0 | OUTPATIENT
Start: 2022-05-27

## 2022-05-27 NOTE — TELEPHONE ENCOUNTER
Prescription refilled on 5.24/22. Refusing refill request and closing encounter.     Brisa Rollins RN on 5/27/2022 at 3:14 PM     son

## 2022-05-27 NOTE — TELEPHONE ENCOUNTER
5/26/22 Voicemail 4:49p    Janeth-Physical therapist with [facility] ([phone]) is requesting verbal orders for 4 visits of physical therapy over the next 6 weeks to help with some right gluteal pain and monitor high blood pressure she's been having.     If leaving a vm (voicemail box is confidential) please leave name and title.    Liliana Shirley on 5/27/2022 at 10:16 AM

## 2022-06-02 ENCOUNTER — TELEPHONE (OUTPATIENT)
Dept: PEDIATRICS | Facility: CLINIC | Age: 87
End: 2022-06-02
Payer: COMMERCIAL

## 2022-06-02 DIAGNOSIS — R03.0 ELEVATED BP WITHOUT DIAGNOSIS OF HYPERTENSION: Primary | ICD-10-CM

## 2022-06-02 DIAGNOSIS — Z53.9 DIAGNOSIS NOT YET DEFINED: Primary | ICD-10-CM

## 2022-06-02 PROCEDURE — 99207 PR MD CERTIFICATION HHA PATIENT: CPT | Performed by: INTERNAL MEDICINE

## 2022-06-02 RX ORDER — AMLODIPINE BESYLATE 5 MG/1
5 TABLET ORAL DAILY
Qty: 30 TABLET | Refills: 0 | Status: SHIPPED | OUTPATIENT
Start: 2022-06-02 | End: 2022-06-06

## 2022-06-05 ENCOUNTER — NURSE TRIAGE (OUTPATIENT)
Dept: NURSING | Facility: CLINIC | Age: 87
End: 2022-06-05
Payer: COMMERCIAL

## 2022-06-05 NOTE — TELEPHONE ENCOUNTER
Patient's daughter, Ivonne, calling - no consent on file.  Patient is in the community room now, not with caller.  Advised to call nurse triage back when patient is there to give verbal consent and we can triage her symptoms.  Caller asks to speak with scheduling.  Transferred to scheduling.    Rosey Bertrand RN  Triage Nurse Advisor       Reason for Disposition    [1] Caller is not with the adult (patient) AND [2] probable NON-URGENT symptoms    Protocols used: INFORMATION ONLY CALL - NO TRIAGE-A-

## 2022-06-06 ENCOUNTER — ANCILLARY PROCEDURE (OUTPATIENT)
Dept: GENERAL RADIOLOGY | Facility: CLINIC | Age: 87
End: 2022-06-06
Attending: FAMILY MEDICINE
Payer: COMMERCIAL

## 2022-06-06 ENCOUNTER — OFFICE VISIT (OUTPATIENT)
Dept: FAMILY MEDICINE | Facility: CLINIC | Age: 87
End: 2022-06-06

## 2022-06-06 VITALS
WEIGHT: 117 LBS | RESPIRATION RATE: 18 BRPM | HEART RATE: 96 BPM | SYSTOLIC BLOOD PRESSURE: 128 MMHG | HEIGHT: 59 IN | DIASTOLIC BLOOD PRESSURE: 60 MMHG | TEMPERATURE: 99.2 F | OXYGEN SATURATION: 97 % | BODY MASS INDEX: 23.59 KG/M2

## 2022-06-06 DIAGNOSIS — J18.9 PNEUMONIA OF LEFT LOWER LOBE DUE TO INFECTIOUS ORGANISM: Primary | ICD-10-CM

## 2022-06-06 DIAGNOSIS — R05.9 COUGH: ICD-10-CM

## 2022-06-06 DIAGNOSIS — R03.0 ELEVATED BP WITHOUT DIAGNOSIS OF HYPERTENSION: ICD-10-CM

## 2022-06-06 PROCEDURE — 99214 OFFICE O/P EST MOD 30 MIN: CPT | Performed by: FAMILY MEDICINE

## 2022-06-06 PROCEDURE — 71046 X-RAY EXAM CHEST 2 VIEWS: CPT | Mod: TC | Performed by: RADIOLOGY

## 2022-06-06 RX ORDER — AZITHROMYCIN 250 MG/1
TABLET, FILM COATED ORAL
Qty: 6 TABLET | Refills: 0 | Status: SHIPPED | OUTPATIENT
Start: 2022-06-06 | End: 2022-09-01

## 2022-06-06 RX ORDER — AMLODIPINE BESYLATE 5 MG/1
5 TABLET ORAL DAILY
Qty: 30 TABLET | Refills: 0 | Status: SHIPPED | OUTPATIENT
Start: 2022-06-06 | End: 2022-06-13

## 2022-06-06 ASSESSMENT — ANXIETY QUESTIONNAIRES
6. BECOMING EASILY ANNOYED OR IRRITABLE: SEVERAL DAYS
GAD7 TOTAL SCORE: 5
4. TROUBLE RELAXING: NOT AT ALL
7. FEELING AFRAID AS IF SOMETHING AWFUL MIGHT HAPPEN: NOT AT ALL
5. BEING SO RESTLESS THAT IT IS HARD TO SIT STILL: NOT AT ALL
1. FEELING NERVOUS, ANXIOUS, OR ON EDGE: MORE THAN HALF THE DAYS
GAD7 TOTAL SCORE: 5
8. IF YOU CHECKED OFF ANY PROBLEMS, HOW DIFFICULT HAVE THESE MADE IT FOR YOU TO DO YOUR WORK, TAKE CARE OF THINGS AT HOME, OR GET ALONG WITH OTHER PEOPLE?: NOT DIFFICULT AT ALL
3. WORRYING TOO MUCH ABOUT DIFFERENT THINGS: SEVERAL DAYS
GAD7 TOTAL SCORE: 5
2. NOT BEING ABLE TO STOP OR CONTROL WORRYING: SEVERAL DAYS
7. FEELING AFRAID AS IF SOMETHING AWFUL MIGHT HAPPEN: NOT AT ALL

## 2022-06-06 ASSESSMENT — PATIENT HEALTH QUESTIONNAIRE - PHQ9
SUM OF ALL RESPONSES TO PHQ QUESTIONS 1-9: 9
10. IF YOU CHECKED OFF ANY PROBLEMS, HOW DIFFICULT HAVE THESE PROBLEMS MADE IT FOR YOU TO DO YOUR WORK, TAKE CARE OF THINGS AT HOME, OR GET ALONG WITH OTHER PEOPLE: SOMEWHAT DIFFICULT
SUM OF ALL RESPONSES TO PHQ QUESTIONS 1-9: 9

## 2022-06-06 NOTE — TELEPHONE ENCOUNTER
Per error report, transmission failed:   amLODIPine (NORVASC) 5 MG tablet 30 tablet 0 6/2/2022  No   Sig - Route: Take 1 tablet (5 mg) by mouth daily - Oral   Sent to pharmacy as: amLODIPine Besylate 5 MG Oral Tablet (NORVASC)   Class: E-Prescribe   Order: [id number]   E-Prescribing Status: Transmission to pharmacy failed (6/2/2022  2:22 PM CDT)     Resending rxNoel PARISI RN

## 2022-06-06 NOTE — TELEPHONE ENCOUNTER
Nurse Triage SBAR    Is this a 2nd Level Triage? NO    Situation: Patient calling with daughter present.  Consent: not needed    Background:  Pt's daughter calling as she wants to take pt to clinic to see what is wrong with her as she has a cough.  Pt's daughter is declining triage at this time - and had called in earlier today and wants an appt with Priscilla Smith.  Was told there was only 1 opening left for Dr Smith for tomorrow but that the Dr would have to approve it.  Was told to call the nurse line so calling back to triage now that daughter is with pt/mom.      Assessment:   Coughing hard - no other symptoms.  Coughing up mucous that is thicker at this time.  Cough has been going on for awhile now and pt tested negative for Covid today.  No diffivulty breathing, no fever, no body aches or headache - just the coughing.  Coughs so hard that at times gags after her coughing fits.      Protocol Recommended Disposition:   Be seen within 24 hours by PCP     Recommendation: Advised patient to make an appointment. Transferred to scheduling. . Reviewed concerning symptoms and when to call back.  Will visit [facility] if there are no appointments available in clinic tomorrow.          Meg Conde RN [facility] 6/5/2022 7:45 PM          COVID 19 Nurse Triage Plan/Patient Instructions    Please be aware that novel coronavirus (COVID-19) may be circulating in the community. If you develop symptoms such as fever, cough, or SOB or if you have concerns about the presence of another infection including coronavirus (COVID-19), please contact your health care provider or visit [email or web address].     Disposition/Instructions    In-Person Visit with provider recommended. Reference Visit Selection Guide.    Thank you for taking steps to prevent the spread of this virus.  o Limit your contact with others.  o Wear a simple mask to cover your cough.  o Wash your hands well and  often.    Resources    [facility]: About COVID-19: [email or web address]    CDC: What to Do If You're Sick: www.cdc.gov/coronavirus/2019-ncov/about/steps-when-sick.html    CDC: Ending Home Isolation: www.cdc.gov/coronavirus/2019-ncov/hcp/disposition-in-home-patients.html     CDC: Caring for Someone: www.cdc.gov/coronavirus/2019-ncov/if-you-are-sick/care-for-someone.html     [facility]: Interim Guidance for Hospital Discharge to Home: www.health.[facility].mn./diseases/coronavirus/hcp/hospdischarge.pdf    [facility] clinical trials (COVID-19 research studies): [email or web address]-clinical-trials     Below are the COVID-19 hotlines at the [place] Department of Health ([facility]). Interpreters are available.   o For health questions: Call 106-658-4582 or 1-497.656.2768 (7 a.m. to 7 p.m.)  o For questions about schools and childcare: Call 298-339-8598 or 1-453.101.3120 (7 a.m. to 7 p.m.)                         Reason for Disposition    SEVERE coughing spells (e.g., whooping sound after coughing, vomiting after coughing)    Additional Information    Negative: Severe difficulty breathing (e.g., struggling for each breath, speaks in single words)    Negative: Bluish (or gray) lips or face now    Negative: [1] Difficulty breathing AND [2] exposure to flames, smoke, or fumes    Negative: [1] Stridor AND [2] difficulty breathing    Negative: Sounds like a life-threatening emergency to the triager    Negative: [1] Previous asthma attacks AND [2] this feels like asthma attack    Negative: Dry (non-productive) cough (i.e., no sputum or minimal clear sputum)    Negative: Chest pain  (Exception: MILD central chest pain, present only when coughing)    Negative: Difficulty breathing    Negative: Patient sounds very sick or weak to the triager    Negative: [1] Coughed up blood AND [2] > 1 tablespoon (15 ml) (Exception: blood-tinged sputum)    Negative: Fever > 103 F (39.4 C)    Negative: [1] Fever > 101 F  (38.3 C) AND [2] age > 60    Negative: [1] Fever > 100.0 F (37.8 C) AND [2] bedridden (e.g., nursing home patient, CVA, chronic illness, recovering from surgery)    Negative: [1] Fever > 100.0 F (37.8 C) AND [2] diabetes mellitus or weak immune system (e.g., HIV positive, cancer chemo, splenectomy, organ transplant, chronic steroids)    Negative: Wheezing is present    Protocols used: COUGH - ACUTE [id number]

## 2022-06-06 NOTE — TELEPHONE ENCOUNTER
I see that pt was seen today for on-going cough, dx'ed with pneumonia & treated with Z-pack. BP during OV today is good.    Will f/u with son(Kurt [phone]) next week to f/u on her BP home readings & cough.     Notes from OV on 6/6/22:  Pneumonia of left lower lobe due to infectious organism  Treatment antibiotic below.  Follow-up with primary care if not improving in the next week or develops fever.  - azithromycin (ZITHROMAX) 250 MG tablet; Two tablets first day, then one tablet daily for four days.    BP Readings from Last 3 Encounters:   06/06/22 128/60   05/06/22 124/82   03/01/22 (!) 183/100     Oxana, RN  Patient Advocate Liason (PAL)  [facility]

## 2022-06-06 NOTE — PROGRESS NOTES
"  Assessment & Plan     Pneumonia of left lower lobe due to infectious organism  Treatment antibiotic below.  Follow-up with primary care if not improving in the next week or develops fever.  - azithromycin (ZITHROMAX) 250 MG tablet; Two tablets first day, then one tablet daily for four days.    Cough  - XR Chest 2 Views            No follow-ups on file.    Miki Dinh MD  [facility] JANETTE Bailey is a 89 year old who presents for the following health issues     Acute Illness  Acute illness concerns: Cough- Pt tested negative for covid x 1 day ago  Onset/Duration: a few weeks  Symptoms:  Fever: no  Chills/Sweats: no  Headache (location?): Yes, head has hurt longer than her cough though  Sinus Pressure: no  Conjunctivitis:  no  Ear Pain: no  Rhinorrhea: YES  Congestion: no  Sore Throat: no  Cough: YES-productive of clear sputumCoughs so hard that at times gags after her coughing fits.   Wheeze: YES- sometimes  Decreased Appetite: no  Nausea: no  Vomiting: no  Diarrhea: no  Dysuria/Freq.: no  Dysuria or Hematuria: no  Fatigue/Achiness: Back hurts for a few months so this isn't new  Sick/Strep Exposure: no  Therapies tried and outcome: Cough Drops    Patient here with daughter regarding cough over the past 2 weeks.  Has had some chronic cough issues and clear rhinorrhea that has been ongoing issue but in the last 2 weeks has had deeper and more frequent cough with production of sputum.  No fever.  Normal COVID test yesterday.  Denies chest pain or shortness of breath.    Review of Systems         Objective    /60   Pulse 96   Temp 99.2  F (37.3  C)   Resp 18   Ht 1.499 m (4' 11\")   Wt 53.1 kg (117 lb)   SpO2 97%   BMI 23.63 kg/m    Body mass index is 23.63 kg/m .  Physical Exam   GENERAL: healthy, alert and no distress  HENT: ear canals and TM's normal, nose and mouth without ulcers or lesions  RESP: Localized wheezing left lower lung base, good air movement, " otherwise clear  CV: Regular rate and rhythm 2/6 systolic murmur heard best at right upper sternal border, no peripheral edema    X-ray chest ordered and interpreted in the office today. X-ray shows: Obscured lateral heart border with mild infiltrate compared to prior.  Radiology read pending.

## 2022-06-07 ENCOUNTER — MYC MEDICAL ADVICE (OUTPATIENT)
Dept: PEDIATRICS | Facility: CLINIC | Age: 87
End: 2022-06-07
Payer: COMMERCIAL

## 2022-06-07 DIAGNOSIS — R03.0 ELEVATED BP WITHOUT DIAGNOSIS OF HYPERTENSION: ICD-10-CM

## 2022-06-07 RX ORDER — AMLODIPINE BESYLATE 5 MG/1
TABLET ORAL
Qty: 90 TABLET | OUTPATIENT
Start: 2022-06-07

## 2022-06-07 RX ORDER — NIFEDIPINE 10 MG/1
10 CAPSULE ORAL 3 TIMES DAILY
Status: CANCELLED | OUTPATIENT
Start: 2022-06-07

## 2022-06-07 NOTE — TELEPHONE ENCOUNTER
Please review the MC message from pt's family & advise. Thanks.    We also received refill request for amlodipine from her pharmacy.     Pended both meds, please review & sign the appropriate one. Thanks.     Oxana RN  Patient Advocate Liason (PAL)  [facility]

## 2022-06-07 NOTE — TELEPHONE ENCOUNTER
See the  message from 6/7.    Oxana RN  Patient Advocate Liason (PAL)  [facility]

## 2022-06-13 DIAGNOSIS — R03.0 ELEVATED BP WITHOUT DIAGNOSIS OF HYPERTENSION: ICD-10-CM

## 2022-06-13 RX ORDER — AMLODIPINE BESYLATE 5 MG/1
5 TABLET ORAL DAILY
Qty: 90 TABLET | Refills: 1 | Status: SHIPPED | OUTPATIENT
Start: 2022-06-13 | End: 2022-09-01

## 2022-06-13 RX ORDER — AMLODIPINE BESYLATE 5 MG/1
TABLET ORAL
Qty: 30 TABLET | Refills: 0 | OUTPATIENT
Start: 2022-06-13

## 2022-06-13 NOTE — TELEPHONE ENCOUNTER
See the other encounter. Med has been refilled.    Oxana RN  Patient Advocate Liason (PAL)  [facility]

## 2022-06-13 NOTE — TELEPHONE ENCOUNTER
Called son(Kurt) to get an update.    1. Home BP monitoring:  - home BP readings are consistently below 140-150/80-85(mostly around 140/80)  - checks BP every 2-3 days  - tolerating amlodipine 5 mg every day well, denies any SE  - son will send us the actual home BP readings soon     - Went over 's explanation on amlodipine Vs nifedipine. Son would like to continue amlodipine 5 mg for now. Will let us know with any high BP readings or SE from amlodipine. Refilled amlodipine. Please look for the home BP readings.     2. Cough:  - done with azithromycin, had diarrhea for couple of days but now resolved  - still has lingering productive cough, but much better than before  - other sx's such as runny nose, wheezing & fatigue has been resolved    Will let us if sx's get worse or with any new sx's.     Oxana, RN  Patient Advocate Jennifer (PAL)  [facility]

## 2022-06-13 NOTE — TELEPHONE ENCOUNTER
Called son(Kurt) to get an update.    Went over 's explanation on amlodipine Vs nifedipine. Son would like to continue amlodipine 5 mg for now. Will let us know with any high BP readings or SE from amlodipine.    Sent refill on amlodipine(6 months worth of meds).    Oxana, RN  Patient Advocate Liason (PAL)  [facility]

## 2022-06-16 ENCOUNTER — TELEPHONE (OUTPATIENT)
Dept: PEDIATRICS | Facility: CLINIC | Age: 87
End: 2022-06-16
Payer: COMMERCIAL

## 2022-06-16 NOTE — TELEPHONE ENCOUNTER
Pt son Kurt Soriano(POA) came in today and drop off a piece of paper to regarding to pt blood pressure reading for the week. Pt son would like a phone call back from Meg at [phone].     BP readin/06- 142/86 @ noon  - 127/78 @1110am  - 145/79 @ 100pm HR: 77  - 137/81 @ 400pm HR: 96    GIORGI Joiner, 2022 1:43 PM

## 2022-06-17 NOTE — TELEPHONE ENCOUNTER
- please review the BP readings below & advise. Thanks.     Pt is currently taking amlodipine 5 mg.     Oxana RN  Patient Advocate Liason (PAL)  [facility]

## 2022-06-27 ENCOUNTER — MYC MEDICAL ADVICE (OUTPATIENT)
Dept: PEDIATRICS | Facility: CLINIC | Age: 87
End: 2022-06-27

## 2022-06-27 ENCOUNTER — TELEPHONE (OUTPATIENT)
Dept: PEDIATRICS | Facility: CLINIC | Age: 87
End: 2022-06-27

## 2022-06-27 ENCOUNTER — LAB (OUTPATIENT)
Dept: LAB | Facility: CLINIC | Age: 87
End: 2022-06-27
Payer: COMMERCIAL

## 2022-06-27 DIAGNOSIS — R30.0 DYSURIA: ICD-10-CM

## 2022-06-27 DIAGNOSIS — N39.0 RECURRENT UTI: Primary | ICD-10-CM

## 2022-06-27 LAB
ALBUMIN UR-MCNC: NEGATIVE MG/DL
APPEARANCE UR: ABNORMAL
BACTERIA #/AREA URNS HPF: ABNORMAL /HPF
BILIRUB UR QL STRIP: NEGATIVE
COLOR UR AUTO: YELLOW
GLUCOSE UR STRIP-MCNC: NEGATIVE MG/DL
HGB UR QL STRIP: NEGATIVE
KETONES UR STRIP-MCNC: 15 MG/DL
LEUKOCYTE ESTERASE UR QL STRIP: ABNORMAL
NITRATE UR QL: POSITIVE
PH UR STRIP: 7 [PH] (ref 5–7)
RBC #/AREA URNS AUTO: ABNORMAL /HPF
SP GR UR STRIP: 1.01 (ref 1–1.03)
UROBILINOGEN UR STRIP-ACNC: 0.2 E.U./DL
WBC #/AREA URNS AUTO: ABNORMAL /HPF

## 2022-06-27 PROCEDURE — 87086 URINE CULTURE/COLONY COUNT: CPT

## 2022-06-27 PROCEDURE — 81001 URINALYSIS AUTO W/SCOPE: CPT

## 2022-06-27 PROCEDURE — 87186 SC STD MICRODIL/AGAR DIL: CPT

## 2022-06-27 NOTE — TELEPHONE ENCOUNTER
Reason for Call: Request for an order or referral:    Order or referral being requested:urine    Date needed: as soon as possible    Has the patient been seen by the PCP for this problem? YES    Additional comments: is needing order for urine for uti. States she was seen for this by Dr Llanos.    Phone number Patient can be reached at:  Other phone number:  Kitty Solomon [phone]    Best Time:  any    Can we leave a detailed message on this number?  YES    Call taken on 6/27/2022 at 11:10 AM by Cleo Torres

## 2022-06-27 NOTE — TELEPHONE ENCOUNTER
Called daughter(Neha) back to get details. Pt has been disoriented a little since this morning. Denies fever, chills, abdominal pain, flank pain, trouble urinating, blood in urine, fall/head injury or nausea.     Pt has a hx of UTI, last treated with cephalexin in May of 2022.     Future lab order for UA which was placed by  on 3/6/22 is valid. So, requested daughter to drop off urine sample this afternoon. Daughter agrees to the plan. Family will drop off the urine sample by 4:30 today. Made a lab-only appointment for today.     Please look for the results. Thanks.     VV notes from 5/10/22 by :  Recurrent UTI - 3 this year so far. Likely related to atrophic vaginitis.  Reviewed options. Trial of estrogen cream.  Consider daily antibiotic if UTI continue at a high frequency.    Oxana, RN  Patient Advocate Liason (PAL)  [facility]

## 2022-06-28 RX ORDER — CIPROFLOXACIN 250 MG/1
250 TABLET, FILM COATED ORAL 2 TIMES DAILY
Qty: 6 TABLET | Refills: 0 | Status: SHIPPED | OUTPATIENT
Start: 2022-06-28 | End: 2022-07-01

## 2022-06-28 NOTE — TELEPHONE ENCOUNTER
Please review daughter's  message & advise. Thanks.    Oxana RN  Patient Advocate Liason (PAL)  [facility]

## 2022-06-28 NOTE — TELEPHONE ENCOUNTER
See lab result notes for details.    Oxana, RN  Patient Advocate Liason (PAL)  [facility]

## 2022-06-29 LAB — BACTERIA UR CULT: ABNORMAL

## 2022-06-29 NOTE — TELEPHONE ENCOUNTER
I would recommend starting cephalexin 250mg PO daily once she completes ciprofloxacin. I've sent this prescription in to her pended pharmacy.    Priscilla Smith MD  Internal Medicine-Pediatrics

## 2022-06-29 NOTE — TELEPHONE ENCOUNTER
Called son(Kurt) back & advised as below. He agrees to the plan. No other questions or concerns.     Oxana RN  Patient Advocate Liason (PAL)  [facility]

## 2022-06-29 NOTE — TELEPHONE ENCOUNTER
Son(Kurt) LM at 10:35 am that the family has decided to try the low dose abx as prophylaxis at this time. Call back at [phone].      - please send an appropriate abx & route back to me so that I can update son. Thanks.     Oxana, RN  Patient Advocate Liason (PAL)  [facility]

## 2022-06-30 NOTE — TELEPHONE ENCOUNTER
Patient's son regarding ABX rx. Per son, pharmacy wanted to know if patient should continue with ABX given penicillin allergy. Patient's son does not know what reaction patient has with penicillin or when this reaction would have occurred.     Patient states that one of her prior pharmacists put penicillin on her allergy list and it's been on there ever since. Added a long time ago. Son wants call back with plan.     Continue with ABX or alternative rx recommended?     Ihsan PARISI RN

## 2022-06-30 NOTE — TELEPHONE ENCOUNTER
She was just prescribed cephalexin in the ED and tolerated it without issue, so I think it is safe to proceed with this medication (it was why I chose it - she has previously tolerated it well). If she does develop allergy symptoms, which we can see in up to 10% of patients with penicillin allergy, then she should stop the medication and call us immediately. However, I do think that this is a better option than Bactrim or Macrobid given her age.    Priscilla Smith MD  Internal Medicine-Pediatrics

## 2022-07-16 ENCOUNTER — HEALTH MAINTENANCE LETTER (OUTPATIENT)
Age: 87
End: 2022-07-16

## 2022-08-25 ASSESSMENT — ACTIVITIES OF DAILY LIVING (ADL)
CURRENT_FUNCTION: MONEY MANAGEMENT REQUIRES ASSISTANCE
CURRENT_FUNCTION: LAUNDRY REQUIRES ASSISTANCE
CURRENT_FUNCTION: TRANSPORTATION REQUIRES ASSISTANCE
CURRENT_FUNCTION: PREPARING MEALS REQUIRES ASSISTANCE
CURRENT_FUNCTION: HOUSEWORK REQUIRES ASSISTANCE
CURRENT_FUNCTION: BATHING REQUIRES ASSISTANCE
CURRENT_FUNCTION: SHOPPING REQUIRES ASSISTANCE

## 2022-08-25 ASSESSMENT — ENCOUNTER SYMPTOMS
PARESTHESIAS: 0
ABDOMINAL PAIN: 0
HEMATOCHEZIA: 0
HEMATURIA: 0
FEVER: 0
JOINT SWELLING: 0
FREQUENCY: 1
SORE THROAT: 0
DYSURIA: 0
PALPITATIONS: 0
WEAKNESS: 0
EYE PAIN: 0
DIZZINESS: 0
NAUSEA: 0
ARTHRALGIAS: 0
MYALGIAS: 0
CONSTIPATION: 0
CHILLS: 0
NERVOUS/ANXIOUS: 1
BREAST MASS: 0
SHORTNESS OF BREATH: 0
HEADACHES: 0
DIARRHEA: 0
HEARTBURN: 0
COUGH: 0

## 2022-08-27 SDOH — ECONOMIC STABILITY: INCOME INSECURITY: IN THE LAST 12 MONTHS, WAS THERE A TIME WHEN YOU WERE NOT ABLE TO PAY THE MORTGAGE OR RENT ON TIME?: NO

## 2022-08-27 SDOH — ECONOMIC STABILITY: INCOME INSECURITY: HOW HARD IS IT FOR YOU TO PAY FOR THE VERY BASICS LIKE FOOD, HOUSING, MEDICAL CARE, AND HEATING?: NOT HARD AT ALL

## 2022-08-27 SDOH — ECONOMIC STABILITY: TRANSPORTATION INSECURITY
IN THE PAST 12 MONTHS, HAS THE LACK OF TRANSPORTATION KEPT YOU FROM MEDICAL APPOINTMENTS OR FROM GETTING MEDICATIONS?: NO

## 2022-08-27 SDOH — ECONOMIC STABILITY: FOOD INSECURITY: WITHIN THE PAST 12 MONTHS, THE FOOD YOU BOUGHT JUST DIDN'T LAST AND YOU DIDN'T HAVE MONEY TO GET MORE.: NEVER TRUE

## 2022-08-27 SDOH — ECONOMIC STABILITY: TRANSPORTATION INSECURITY
IN THE PAST 12 MONTHS, HAS LACK OF TRANSPORTATION KEPT YOU FROM MEETINGS, WORK, OR FROM GETTING THINGS NEEDED FOR DAILY LIVING?: NO

## 2022-08-27 SDOH — HEALTH STABILITY: PHYSICAL HEALTH
ON AVERAGE, HOW MANY DAYS PER WEEK DO YOU ENGAGE IN MODERATE TO STRENUOUS EXERCISE (LIKE A BRISK WALK)?: PATIENT DECLINED

## 2022-08-27 SDOH — ECONOMIC STABILITY: FOOD INSECURITY: WITHIN THE PAST 12 MONTHS, YOU WORRIED THAT YOUR FOOD WOULD RUN OUT BEFORE YOU GOT MONEY TO BUY MORE.: NEVER TRUE

## 2022-08-27 SDOH — HEALTH STABILITY: PHYSICAL HEALTH: ON AVERAGE, HOW MANY MINUTES DO YOU ENGAGE IN EXERCISE AT THIS LEVEL?: PATIENT DECLINED

## 2022-08-27 ASSESSMENT — LIFESTYLE VARIABLES
HOW OFTEN DO YOU HAVE A DRINK CONTAINING ALCOHOL: MONTHLY OR LESS
AUDIT-C TOTAL SCORE: 1
HOW MANY STANDARD DRINKS CONTAINING ALCOHOL DO YOU HAVE ON A TYPICAL DAY: 1 OR 2
SKIP TO QUESTIONS 9-10: 1
HOW OFTEN DO YOU HAVE SIX OR MORE DRINKS ON ONE OCCASION: NEVER

## 2022-08-27 ASSESSMENT — SOCIAL DETERMINANTS OF HEALTH (SDOH)
IN A TYPICAL WEEK, HOW MANY TIMES DO YOU TALK ON THE PHONE WITH FAMILY, FRIENDS, OR NEIGHBORS?: MORE THAN THREE TIMES A WEEK
DO YOU BELONG TO ANY CLUBS OR ORGANIZATIONS SUCH AS CHURCH GROUPS UNIONS, FRATERNAL OR ATHLETIC GROUPS, OR SCHOOL GROUPS?: NO
HOW OFTEN DO YOU ATTEND CHURCH OR RELIGIOUS SERVICES?: MORE THAN 4 TIMES PER YEAR
HOW OFTEN DO YOU GET TOGETHER WITH FRIENDS OR RELATIVES?: MORE THAN THREE TIMES A WEEK

## 2022-09-01 ENCOUNTER — OFFICE VISIT (OUTPATIENT)
Dept: PEDIATRICS | Facility: CLINIC | Age: 87
End: 2022-09-01
Payer: COMMERCIAL

## 2022-09-01 VITALS
BODY MASS INDEX: 24.52 KG/M2 | DIASTOLIC BLOOD PRESSURE: 62 MMHG | OXYGEN SATURATION: 98 % | RESPIRATION RATE: 18 BRPM | HEART RATE: 82 BPM | SYSTOLIC BLOOD PRESSURE: 122 MMHG | TEMPERATURE: 98.1 F | WEIGHT: 121.4 LBS

## 2022-09-01 DIAGNOSIS — I73.00 RAYNAUD'S DISEASE WITHOUT GANGRENE: Primary | ICD-10-CM

## 2022-09-01 PROCEDURE — 99213 OFFICE O/P EST LOW 20 MIN: CPT | Mod: GE | Performed by: STUDENT IN AN ORGANIZED HEALTH CARE EDUCATION/TRAINING PROGRAM

## 2022-09-01 RX ORDER — AMLODIPINE BESYLATE 5 MG/1
5 TABLET ORAL DAILY
Qty: 90 TABLET | Refills: 1 | Status: SHIPPED | OUTPATIENT
Start: 2022-09-01 | End: 2023-01-01

## 2022-09-01 ASSESSMENT — ACTIVITIES OF DAILY LIVING (ADL)
CURRENT_FUNCTION: MONEY MANAGEMENT REQUIRES ASSISTANCE
CURRENT_FUNCTION: SHOPPING REQUIRES ASSISTANCE
CURRENT_FUNCTION: HOUSEWORK REQUIRES ASSISTANCE
CURRENT_FUNCTION: TRANSPORTATION REQUIRES ASSISTANCE
CURRENT_FUNCTION: PREPARING MEALS REQUIRES ASSISTANCE
CURRENT_FUNCTION: LAUNDRY REQUIRES ASSISTANCE
CURRENT_FUNCTION: BATHING REQUIRES ASSISTANCE

## 2022-09-01 ASSESSMENT — PAIN SCALES - GENERAL: PAINLEVEL: EXTREME PAIN (9)

## 2022-09-01 NOTE — PROGRESS NOTES
Assessment & Plan     Raynaud's disease without gangrene  Patient's Raynaud's disease is currently impairing her ability to function.  We will start with amlodipine 5 mg and follow-up in approximately 1 month.  Would be reasonable to gradually increase to achieve symptomatic relief as long as the patient does not develop hypotension.  -Counseled the patient to continue to attempt conservative methods such as wearing gloves, dressing in multiple layers keeping the house warm etc.  - amLODIPine (NORVASC) 5 MG tablet  Dispense: 90 tablet; Refill: 1  - Counseled patient regarding possibility of hypotension with this medication.  A family member will assist her in measuring her blood pressure daily.  - Follow-up in clinic with me in 1 month to assess for effect of amlodipine 5 mg and determine whether the dose should be increased.        Provider  Link to [facility] Help Grid :[id number]}         Delon López MD  [facility] BERYL Bailey is a 89 year old, presenting for the following health issues:  RECHECK (Raynaud's and other issues)    The patient notes that she had Raynaud's as a young woman but symptoms subsequently resolved spontaneously.  However, over the last few years symptoms have returned.  At the present symptoms are persistent and debilitating even in the summer months.  She can no longer admit or put puzzle pieces together.  She has started to drop things.  She feels discomfort from her fingers to her wrist in both hands.  Symptoms are worse with exposure to the cold and improve with exposure to warmth.     Family history notable for Raynaud's and her son and her granddaughter.  Son symptoms have improved since he has moved to [place] but granddaughter who lives in [place] continues to have significant discomfort.    She has not tried medications for this in the past and is open to starting something.    Healthy Habits:     In general, how would you rate your overall health?  Fair    " Frequency of exercise:  1 day/week    Duration of exercise:  Less than 15 minutes    Do you usually eat at least 4 servings of fruit and vegetables a day, include whole grains    & fiber and avoid regularly eating high fat or \"junk\" foods?  Yes    Taking medications regularly:  Yes    Medication side effects:  None    Ability to successfully perform activities of daily living:  Transportation requires assistance, shopping requires assistance, preparing meals requires assistance, housework requires assistance, bathing requires assistance, laundry requires assistance and money management requires assistance    Home Safety:  No safety concerns identified    Hearing Impairment:  No hearing concerns    In the past 6 months, have you been bothered by leaking of urine? Yes    In general, how would you rate your overall mental or emotional health?  Good      PHQ-2 Total Score: 1    Additional concerns today:  Yes     Review of Systems   Constitutional: Negative for chills and fever.   HENT: Negative for congestion, ear pain, hearing loss and sore throat.    Eyes: Negative for pain and visual disturbance.   Respiratory: Negative for cough and shortness of breath.    Cardiovascular: Negative for chest pain, palpitations and peripheral edema.   Gastrointestinal: Negative for abdominal pain, constipation, diarrhea, heartburn, hematochezia and nausea.   Breasts:  Negative for tenderness, breast mass and discharge.   Genitourinary:  Negative for dysuria, genital sores, hematuria, pelvic pain, urgency, vaginal bleeding and vaginal discharge.   Musculoskeletal: Negative for arthralgias, joint swelling and myalgias.   Skin: Negative for rash.   Neurological: Negative for dizziness, weakness, headaches and paresthesias.   Psychiatric/Behavioral: Negative for mood changes.       Objective    /62 (BP Location: Right arm, Patient Position: Sitting, Cuff Size: Adult Small)   Pulse 82   Temp 98.1  F (36.7  C) (Tympanic)   Resp " 18   Wt 55.1 kg (121 lb 6.4 oz)   SpO2 98%   BMI 24.52 kg/m    Body mass index is 24.52 kg/m .  Physical Exam   GENERAL: healthy, alert and no distress  EYES: Eyes grossly normal to inspection  CARD: Radial pulse palpated.   RESP: Nonlabored breathing on room air.  MS: no gross musculoskeletal defects noted.   SKIN: No ulcerations or signs of ischemic damage on fingers.  NEURO: Mentation intact and speech normal  PSYCH: Affect normal/bright

## 2022-09-02 DIAGNOSIS — I73.00 RAYNAUD'S DISEASE WITHOUT GANGRENE: ICD-10-CM

## 2022-09-02 RX ORDER — AMLODIPINE BESYLATE 5 MG/1
10 TABLET ORAL DAILY
Qty: 90 TABLET | Refills: 1 | Status: CANCELLED | OUTPATIENT
Start: 2022-09-02

## 2022-09-02 NOTE — TELEPHONE ENCOUNTER
Letter for updated order printed, placed in outbasket to fax.    Aviva Woodruff MD  Internal Medicine & Pediatrics  [facility]  She/her

## 2022-09-02 NOTE — TELEPHONE ENCOUNTER
Son called with follow up questions from patient's office visit yesterday.     Patient has been taking amlodipine 5mg ongoing. Patient's son had told Dr. López that patient off the amlodipine in error. Patient's son wonders if patient should increase the amlodipine to 10mg daily instead of just 5mg like she has been.     Should patient continue to take potassium and colace daily? Patient has been taking both potassium and colace daily but son does not see that on patient's discharge instructions.     Brisa Rollins RN on 9/2/2022 at 1:27 PM

## 2022-09-02 NOTE — TELEPHONE ENCOUNTER
"Called pt's son, Chadwick.    Agreed to plan and will monitor blood pressures daily.    States that [facility] requires written orders for medication to faxed over (fax to Rashmi Jamie: [phone]).    Dr Woodruff: Are you able to either write a new order for amlodipine or a written letter as orders?      - Mac \"Tomas\" Mony (he/him/his), RN - Patient Advocate Liason (PAL)  [facility]    "

## 2022-09-02 NOTE — TELEPHONE ENCOUNTER
Ok to try increase in amlodipine (Norvasc) to 10mg - so take 2 of the tablets. If patient feels lightheaded or dizzy at all or blood pressure gets too low would go back to 5mg.    Ok to continue K (potassium) and colace.      Aviva Woodruff MD  Internal Medicine & Pediatrics  [facility] [place]  She/her

## 2022-09-02 NOTE — LETTER
September 2, 2022      Lynn Soriano  [address]   [facility] [address]      Take amlodipine (Norvasc) 10mg by mouth daily (two 5mg tablets).  Hold medication and call clinic for blood pressure <105/60.      Aviva Woodruff MD

## 2022-09-11 ENCOUNTER — MYC MEDICAL ADVICE (OUTPATIENT)
Dept: PEDIATRICS | Facility: CLINIC | Age: 87
End: 2022-09-11

## 2022-09-12 NOTE — TELEPHONE ENCOUNTER
Per Up to Date-Norvasc may be administered without regard to meals.  My Chart message sent to patient.  Copy of medication list faxed to [facility] nursing office per patient request.  RIK Oden RN

## 2022-09-14 ENCOUNTER — TELEPHONE (OUTPATIENT)
Dept: PEDIATRICS | Facility: CLINIC | Age: 87
End: 2022-09-14

## 2022-09-14 NOTE — TELEPHONE ENCOUNTER
Patient  Calling for qeustions with her Tylenol dose .  She can safely increase her tylenol to 3000 mg per day for any hand discomfort.     She is having some discomfort with the Raynaud's disease.     Ellen Perkins RN  -[facility]

## 2022-09-18 ENCOUNTER — HEALTH MAINTENANCE LETTER (OUTPATIENT)
Age: 87
End: 2022-09-18

## 2022-09-29 ENCOUNTER — ANCILLARY PROCEDURE (OUTPATIENT)
Dept: GENERAL RADIOLOGY | Facility: CLINIC | Age: 87
End: 2022-09-29
Attending: STUDENT IN AN ORGANIZED HEALTH CARE EDUCATION/TRAINING PROGRAM
Payer: COMMERCIAL

## 2022-09-29 ENCOUNTER — OFFICE VISIT (OUTPATIENT)
Dept: PEDIATRICS | Facility: CLINIC | Age: 87
End: 2022-09-29
Payer: COMMERCIAL

## 2022-09-29 VITALS
SYSTOLIC BLOOD PRESSURE: 124 MMHG | HEART RATE: 82 BPM | HEIGHT: 59 IN | WEIGHT: 127.1 LBS | RESPIRATION RATE: 16 BRPM | TEMPERATURE: 97.4 F | BODY MASS INDEX: 25.62 KG/M2 | OXYGEN SATURATION: 95 % | DIASTOLIC BLOOD PRESSURE: 62 MMHG

## 2022-09-29 DIAGNOSIS — I73.00 RAYNAUD'S DISEASE WITHOUT GANGRENE: Primary | ICD-10-CM

## 2022-09-29 DIAGNOSIS — I73.00 RAYNAUD'S DISEASE WITHOUT GANGRENE: ICD-10-CM

## 2022-09-29 PROCEDURE — 99213 OFFICE O/P EST LOW 20 MIN: CPT | Mod: GE | Performed by: STUDENT IN AN ORGANIZED HEALTH CARE EDUCATION/TRAINING PROGRAM

## 2022-09-29 PROCEDURE — 73130 X-RAY EXAM OF HAND: CPT | Mod: TC | Performed by: RADIOLOGY

## 2022-09-29 RX ORDER — AMLODIPINE BESYLATE 10 MG/1
15 TABLET ORAL DAILY
Qty: 90 TABLET | Refills: 3 | Status: SHIPPED | OUTPATIENT
Start: 2022-09-29 | End: 2023-01-01

## 2022-09-29 ASSESSMENT — PAIN SCALES - GENERAL: PAINLEVEL: SEVERE PAIN (6)

## 2022-09-29 NOTE — PROGRESS NOTES
"  Assessment & Plan     Raynaud's disease without gangrene  The patient has not had significant improvement in her Raynaud's symptoms despite starting amlodipine.  We obtained a series of hand x-rays to exclude osteoarthritis as this could also cause worse discomfort in the cold.  Given the normal x-rays, her history of worst discomfort with exposure to the cold and significant family history of Raynaud's disease, it is likely that her hand discomfort is due to Raynaud's disease.  We will increase the amlodipine.  - Counseled the patient and her daughter regarding the importance of monitoring for side effects of the increased dose of amlodipine (hypotension, dizziness, lightheadedness, increased lower extremity edema).  If at any point the patient develops concerning symptoms, we will decrease the dose of amlodipine and explore other options for treating her Raynaud's such as vasodilator therapy.  - XR Hand Bilateral G/E 3 Views  - amLODIPine (NORVASC) 15 MG  - Follow-up with me within 1 to 2 months to assess for improvement of symptoms on the increased dose of amlodipine.  - Adult Rheumatology  Referral for the patient to discuss other potential therapeutic options with a [facility] rheumatologist.             BMI:   Estimated body mass index is 25.67 kg/m  as calculated from the following:    Height as of this encounter: 1.499 m (4' 11\").    Weight as of this encounter: 57.7 kg (127 lb 1.6 oz).       Delon López MD  [facility] BERYL Bailey is a 89 year old accompanied by her daughter- Mary Jane, presenting for the following health issues:  RECHECK    The patient presents for repeat evaluation of her Raynaud's disease on amlodipine.  She has been taking 10 mg of amlodipine daily for the last month and has not noticed significant improvement in her Raynaud's symptoms.  She still has discomfort when her hands are exposed to the cold that is relieved by warming her hands.  She has not " "had hypotension or symptoms of lightheadedness or dizziness on the increased dose of amlodipine.  She does note slightly increased lower extremity edema.    As previously noted, she has a significant family history of Raynaud's disease including disease in her son and granddaughter.    Review of Systems   Constitutional, HEENT, cardiovascular, pulmonary, gi and gu systems are negative, except as otherwise noted.      Objective    /62 (BP Location: Right arm, Cuff Size: Adult Regular)   Pulse 82   Temp 97.4  F (36.3  C) (Tympanic)   Resp 16   Ht 1.499 m (4' 11\")   Wt 57.7 kg (127 lb 1.6 oz)   SpO2 95%   BMI 25.67 kg/m    Body mass index is 25.67 kg/m .  Physical Exam   GENERAL: healthy, alert and no distress  EYES: Eyes grossly normal to inspection  RESP: Breathing comfortably on room air.    CV: Warm and well perfused.  1+ edema present on both lower extremities.  MS: no gross musculoskeletal defects noted  SKIN: no suspicious lesions or rashes  NEURO: Normal strength and tone, mentation intact and speech normal  PSYCH: mentation appears normal, affect normal/bright    Hand x-rays unremarkable.              "

## 2022-09-30 ENCOUNTER — TELEPHONE (OUTPATIENT)
Dept: PEDIATRICS | Facility: CLINIC | Age: 87
End: 2022-09-30

## 2022-09-30 ENCOUNTER — MEDICAL CORRESPONDENCE (OUTPATIENT)
Dept: HEALTH INFORMATION MANAGEMENT | Facility: CLINIC | Age: 87
End: 2022-09-30

## 2022-09-30 DIAGNOSIS — I73.00 RAYNAUD'S DISEASE WITHOUT GANGRENE: Primary | ICD-10-CM

## 2022-10-06 NOTE — TELEPHONE ENCOUNTER
Referral faxed to [phone] [facility].    Liliana Shirley on 10/6/2022 at 11:16 AM

## 2022-10-06 NOTE — TELEPHONE ENCOUNTER
Does a different referral need to be placed or can I send the one for  Rheumatology?    Liliana Shirley on 10/6/2022 at 11:04 AM

## 2022-10-27 NOTE — PROGRESS NOTES
"  Assessment & Plan     Raynaud's disease without gangrene  The patient has not had significant improvement in her Raynaud's symptoms despite increasing the dose of amlodipine.    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  They are planning to start her on colchicine and follow-up in December to assess for improvement.  - Would be reasonable to stay on the current dose of amlodipine at this time.  While the patient has had an increase in lower extremity edema, her blood pressure remains on the high/normal side.  - Follow-up with me in January or February to assess for improvement with symptoms on the colchicine.  At that time if the patient is still having persistent symptoms from Raynaud's we can investigate discontinuing the amlodipine and trying another agent such as topical nitrate or sildenafil.         BMI:   Estimated body mass index is 25.67 kg/m  as calculated from the following:    Height as of 9/29/22: 1.499 m (4' 11\").    Weight as of this encounter: 57.7 kg (127 lb 1.6 oz).       See above    Delon López MD  [facility] BERYL Bailey is a 89 year old accompanied by her daughter, presenting for the following health issues:  Recheck Medication      History of Present Illness       Reason for visit:  Followup from rheumatoligist appt    She eats 2-3 servings of fruits and vegetables daily.She consumes 1 sweetened beverage(s) daily.She exercises with enough effort to increase her heart rate 9 or less minutes per day.  She exercises with enough effort to increase her heart rate 3 or less days per week.   She is taking medications regularly.     She patient presents for repeat evaluation of her Raynaud's disease on amlodipine.  She has been taking 15 mg of amlodipine daily for the last month and has not noticed significant improvement in her Raynaud's symptoms.  She still has discomfort when her hands are exposed to the cold that is relieved by warming her hands. She has not " had hypotension or symptoms of lightheadedness or dizziness on the increased dose of amlodipine.  She does note increased lower extremity edema, but this does not bother her.  She visited with a rheumatologist who had concern for CPPD and started her on colchicine.  The patient has not yet started the colchicine.     At night she has been using a menthol-based cream and gloves which relieves symptoms.     As previously noted, she has a significant family history of Raynaud's disease including disease in her son and granddaughter.     Review of Systems   Constitutional, HEENT, cardiovascular, pulmonary, gi and gu systems are negative, except as otherwise noted.    BP Readings from Last 6 Encounters:   10/27/22 (!) 140/64   09/29/22 124/62   09/01/22 122/62   06/06/22 128/60   05/06/22 124/82   03/01/22 (!) 183/100           Objective    BP (!) 140/64 (BP Location: Right arm, Patient Position: Chair, Cuff Size: Adult Regular)   Pulse 85   Temp 97.5  F (36.4  C) (Tympanic)   Resp 16   Wt 57.7 kg (127 lb 1.6 oz)   SpO2 99%   BMI 25.67 kg/m    Body mass index is 25.67 kg/m .  Physical Exam   GENERAL: healthy, alert and no distress  EYES: Eyes grossly normal to inspection  RESP: Breathing comfortably on room air.    CV: Warm and well perfused.  1+ edema present on both lower extremities.  MS: no gross musculoskeletal defects noted  SKIN: no suspicious lesions or rashes  NEURO: Normal strength and tone, mentation intact and speech normal  PSYCH: mentation appears normal, affect normal/bright

## 2022-10-28 NOTE — TELEPHONE ENCOUNTER
Please see My Chart message.  Patient's daughter is requesting to decrease the dose of Amlodipine to 5 or 10 mg.  They have not noted an improvement in the Raynaud's symptoms on the increased dose of Amlodipine 15 mg, and are concerned about peripheral swelling and weight gain.  Please advise.  RIK Oden RN

## 2022-10-28 NOTE — TELEPHONE ENCOUNTER
Ok to decrease to either 5 or 10mg.    Aviva Woodruff MD  Internal Medicine & Pediatrics  [facility]  She/her

## 2022-12-27 NOTE — TELEPHONE ENCOUNTER
Standing order for urinalysis signed.  Needs to give sterile sample at the lab with lab appointment.    Aviva Woodruff MD  Internal Medicine & Pediatrics  [facility]  She/her

## 2023-01-01 ENCOUNTER — TELEPHONE (OUTPATIENT)
Dept: PEDIATRICS | Facility: CLINIC | Age: 88
End: 2023-01-01
Payer: COMMERCIAL

## 2023-01-01 ENCOUNTER — E-VISIT (OUTPATIENT)
Dept: PEDIATRICS | Facility: CLINIC | Age: 88
End: 2023-01-01
Payer: COMMERCIAL

## 2023-01-01 ENCOUNTER — APPOINTMENT (OUTPATIENT)
Dept: CT IMAGING | Facility: CLINIC | Age: 88
End: 2023-01-01
Attending: EMERGENCY MEDICINE
Payer: COMMERCIAL

## 2023-01-01 ENCOUNTER — APPOINTMENT (OUTPATIENT)
Dept: GENERAL RADIOLOGY | Facility: CLINIC | Age: 88
End: 2023-01-01
Attending: EMERGENCY MEDICINE
Payer: COMMERCIAL

## 2023-01-01 ENCOUNTER — TELEPHONE (OUTPATIENT)
Dept: GERIATRICS | Facility: CLINIC | Age: 88
End: 2023-01-01
Payer: COMMERCIAL

## 2023-01-01 ENCOUNTER — LAB REQUISITION (OUTPATIENT)
Dept: LAB | Facility: CLINIC | Age: 88
End: 2023-01-01
Payer: COMMERCIAL

## 2023-01-01 ENCOUNTER — HEALTH MAINTENANCE LETTER (OUTPATIENT)
Age: 88
End: 2023-01-01

## 2023-01-01 ENCOUNTER — OFFICE VISIT (OUTPATIENT)
Dept: PEDIATRICS | Facility: CLINIC | Age: 88
End: 2023-01-01
Payer: COMMERCIAL

## 2023-01-01 ENCOUNTER — MEDICAL CORRESPONDENCE (OUTPATIENT)
Dept: HEALTH INFORMATION MANAGEMENT | Facility: CLINIC | Age: 88
End: 2023-01-01

## 2023-01-01 ENCOUNTER — ASSISTED LIVING VISIT (OUTPATIENT)
Dept: GERIATRICS | Facility: CLINIC | Age: 88
End: 2023-01-01
Payer: COMMERCIAL

## 2023-01-01 ENCOUNTER — TELEPHONE (OUTPATIENT)
Dept: EMERGENCY MEDICINE | Facility: CLINIC | Age: 88
End: 2023-01-01
Payer: COMMERCIAL

## 2023-01-01 ENCOUNTER — MYC MEDICAL ADVICE (OUTPATIENT)
Dept: PEDIATRICS | Facility: CLINIC | Age: 88
End: 2023-01-01
Payer: COMMERCIAL

## 2023-01-01 ENCOUNTER — HOSPITAL ENCOUNTER (EMERGENCY)
Facility: CLINIC | Age: 88
Discharge: HOME | End: 2023-03-06
Attending: EMERGENCY MEDICINE | Admitting: EMERGENCY MEDICINE
Payer: COMMERCIAL

## 2023-01-01 ENCOUNTER — DOCUMENTATION ONLY (OUTPATIENT)
Dept: OTHER | Facility: CLINIC | Age: 88
End: 2023-01-01
Payer: COMMERCIAL

## 2023-01-01 ENCOUNTER — ASSISTED LIVING VISIT (OUTPATIENT)
Dept: GERIATRICS | Facility: CLINIC | Age: 88
End: 2023-01-01
Payer: MEDICARE

## 2023-01-01 VITALS
DIASTOLIC BLOOD PRESSURE: 66 MMHG | OXYGEN SATURATION: 94 % | RESPIRATION RATE: 18 BRPM | HEART RATE: 70 BPM | SYSTOLIC BLOOD PRESSURE: 123 MMHG

## 2023-01-01 VITALS
TEMPERATURE: 97.4 F | BODY MASS INDEX: 19.67 KG/M2 | DIASTOLIC BLOOD PRESSURE: 74 MMHG | OXYGEN SATURATION: 97 % | HEIGHT: 63 IN | WEIGHT: 111 LBS | SYSTOLIC BLOOD PRESSURE: 133 MMHG | RESPIRATION RATE: 18 BRPM | HEART RATE: 99 BPM

## 2023-01-01 VITALS
HEIGHT: 63 IN | OXYGEN SATURATION: 93 % | WEIGHT: 111 LBS | HEART RATE: 93 BPM | TEMPERATURE: 97.7 F | RESPIRATION RATE: 18 BRPM | BODY MASS INDEX: 19.67 KG/M2 | SYSTOLIC BLOOD PRESSURE: 166 MMHG | DIASTOLIC BLOOD PRESSURE: 66 MMHG

## 2023-01-01 VITALS
HEIGHT: 58 IN | OXYGEN SATURATION: 95 % | HEART RATE: 77 BPM | WEIGHT: 127 LBS | BODY MASS INDEX: 26.66 KG/M2 | RESPIRATION RATE: 16 BRPM | SYSTOLIC BLOOD PRESSURE: 126 MMHG | DIASTOLIC BLOOD PRESSURE: 65 MMHG

## 2023-01-01 VITALS
DIASTOLIC BLOOD PRESSURE: 48 MMHG | BODY MASS INDEX: 26.21 KG/M2 | OXYGEN SATURATION: 98 % | RESPIRATION RATE: 14 BRPM | TEMPERATURE: 97.1 F | WEIGHT: 130 LBS | HEIGHT: 59 IN | HEART RATE: 66 BPM | SYSTOLIC BLOOD PRESSURE: 136 MMHG

## 2023-01-01 VITALS
RESPIRATION RATE: 16 BRPM | SYSTOLIC BLOOD PRESSURE: 120 MMHG | OXYGEN SATURATION: 96 % | DIASTOLIC BLOOD PRESSURE: 55 MMHG | HEART RATE: 72 BPM

## 2023-01-01 VITALS
HEART RATE: 62 BPM | OXYGEN SATURATION: 97 % | RESPIRATION RATE: 16 BRPM | SYSTOLIC BLOOD PRESSURE: 140 MMHG | DIASTOLIC BLOOD PRESSURE: 67 MMHG | TEMPERATURE: 98.9 F

## 2023-01-01 VITALS
DIASTOLIC BLOOD PRESSURE: 87 MMHG | BODY MASS INDEX: 19.66 KG/M2 | SYSTOLIC BLOOD PRESSURE: 155 MMHG | HEART RATE: 124 BPM | RESPIRATION RATE: 22 BRPM | OXYGEN SATURATION: 92 % | HEIGHT: 63 IN

## 2023-01-01 VITALS — RESPIRATION RATE: 18 BRPM | DIASTOLIC BLOOD PRESSURE: 73 MMHG | HEART RATE: 80 BPM | SYSTOLIC BLOOD PRESSURE: 122 MMHG

## 2023-01-01 DIAGNOSIS — G31.84 MCI (MILD COGNITIVE IMPAIRMENT): ICD-10-CM

## 2023-01-01 DIAGNOSIS — E87.6 HYPOKALEMIA: ICD-10-CM

## 2023-01-01 DIAGNOSIS — S09.90XA CLOSED HEAD INJURY, INITIAL ENCOUNTER: ICD-10-CM
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
## 2023-01-01 DIAGNOSIS — N39.0 RECURRENT UTI: ICD-10-CM

## 2023-01-01 DIAGNOSIS — R30.0 DYSURIA: Primary | ICD-10-CM

## 2023-01-01 DIAGNOSIS — R10.84 ABDOMINAL PAIN, GENERALIZED: Primary | ICD-10-CM

## 2023-01-01 DIAGNOSIS — I10 BENIGN ESSENTIAL HYPERTENSION: ICD-10-CM

## 2023-01-01 DIAGNOSIS — K59.00 CONSTIPATION: Primary | ICD-10-CM

## 2023-01-01 DIAGNOSIS — M11.20 CHONDROCALCINOSIS: ICD-10-CM

## 2023-01-01 DIAGNOSIS — I73.00 RAYNAUD'S DISEASE WITHOUT GANGRENE: Primary | ICD-10-CM

## 2023-01-01 DIAGNOSIS — R29.898 WEAKNESS OF BOTH HANDS: ICD-10-CM

## 2023-01-01 DIAGNOSIS — R29.898 WEAKNESS OF BOTH LOWER EXTREMITIES: Primary | ICD-10-CM

## 2023-01-01 DIAGNOSIS — R29.898 WEAKNESS OF BOTH LOWER EXTREMITIES: ICD-10-CM

## 2023-01-01 DIAGNOSIS — Z86.39 HISTORY OF LOW POTASSIUM: ICD-10-CM

## 2023-01-01 DIAGNOSIS — R29.6 FALLS FREQUENTLY: Primary | ICD-10-CM

## 2023-01-01 DIAGNOSIS — K59.00 CONSTIPATION, UNSPECIFIED CONSTIPATION TYPE: Primary | ICD-10-CM

## 2023-01-01 DIAGNOSIS — M11.241 PSEUDOGOUT OF HAND, RIGHT: Primary | ICD-10-CM

## 2023-01-01 DIAGNOSIS — S80.11XA CONTUSION OF RIGHT LOWER EXTREMITY, INITIAL ENCOUNTER: ICD-10-CM

## 2023-01-01 DIAGNOSIS — M81.0 AGE-RELATED OSTEOPOROSIS WITHOUT CURRENT PATHOLOGICAL FRACTURE: ICD-10-CM

## 2023-01-01 DIAGNOSIS — R52 PAIN: ICD-10-CM

## 2023-01-01 DIAGNOSIS — R29.898 RIGHT LEG WEAKNESS: ICD-10-CM

## 2023-01-01 DIAGNOSIS — F32.A DEPRESSION, UNSPECIFIED DEPRESSION TYPE: ICD-10-CM

## 2023-01-01 DIAGNOSIS — M62.81 GENERALIZED MUSCLE WEAKNESS: ICD-10-CM

## 2023-01-01 DIAGNOSIS — K59.00 CONSTIPATION, UNSPECIFIED CONSTIPATION TYPE: ICD-10-CM
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
## 2023-01-01 DIAGNOSIS — I73.00 RAYNAUD'S DISEASE WITHOUT GANGRENE: ICD-10-CM

## 2023-01-01 DIAGNOSIS — Z53.9 DIAGNOSIS NOT YET DEFINED: Primary | ICD-10-CM

## 2023-01-01 DIAGNOSIS — R29.6 RECURRENT FALLS: ICD-10-CM

## 2023-01-01 DIAGNOSIS — F32.A DEPRESSION: Primary | ICD-10-CM

## 2023-01-01 DIAGNOSIS — R82.90 ABNORMAL URINALYSIS: ICD-10-CM

## 2023-01-01 DIAGNOSIS — Z71.89 ACP (ADVANCE CARE PLANNING): ICD-10-CM

## 2023-01-01 DIAGNOSIS — R33.9 URINARY RETENTION: ICD-10-CM

## 2023-01-01 DIAGNOSIS — I67.9 CEREBRAL VASCULAR DISEASE: ICD-10-CM

## 2023-01-01 DIAGNOSIS — F43.21 ADJUSTMENT DISORDER WITH DEPRESSED MOOD: ICD-10-CM

## 2023-01-01 LAB
ALBUMIN UR-MCNC: 30 MG/DL
ALBUMIN UR-MCNC: NEGATIVE MG/DL
ALBUMIN UR-MCNC: NEGATIVE MG/DL
ANION GAP SERPL CALCULATED.3IONS-SCNC: 10 MMOL/L (ref 7–15)
APPEARANCE UR: ABNORMAL
APPEARANCE UR: CLEAR
APPEARANCE UR: CLEAR
ATRIAL RATE - MUSE: 88 BPM
BACTERIA UR CULT: ABNORMAL
BACTERIA UR CULT: ABNORMAL
BASOPHILS # BLD AUTO: 0 10E3/UL (ref 0–0.2)
BASOPHILS NFR BLD AUTO: 1 %
BILIRUB UR QL STRIP: NEGATIVE
BUN SERPL-MCNC: 17.8 MG/DL (ref 8–23)
C DIFF TOX B STL QL: NEGATIVE
CALCIUM SERPL-MCNC: 9.4 MG/DL (ref 8.2–9.6)
CHLORIDE SERPL-SCNC: 104 MMOL/L (ref 98–107)
COLOR UR AUTO: ABNORMAL
COLOR UR AUTO: YELLOW
COLOR UR AUTO: YELLOW
CREAT SERPL-MCNC: 0.62 MG/DL (ref 0.51–0.95)
DEPRECATED HCO3 PLAS-SCNC: 27 MMOL/L (ref 22–29)
DIASTOLIC BLOOD PRESSURE - MUSE: NORMAL MMHG
EOSINOPHIL # BLD AUTO: 0.3 10E3/UL (ref 0–0.7)
EOSINOPHIL NFR BLD AUTO: 5 %
ERYTHROCYTE [DISTWIDTH] IN BLOOD BY AUTOMATED COUNT: 13.2 % (ref 10–15)
GFR SERPL CREATININE-BSD FRML MDRD: 82 ML/MIN/1.73M2
GLUCOSE SERPL-MCNC: 101 MG/DL (ref 70–99)
GLUCOSE UR STRIP-MCNC: NEGATIVE MG/DL
HCT VFR BLD AUTO: 40.2 % (ref 35–47)
HGB BLD-MCNC: 12.8 G/DL (ref 11.7–15.7)
HGB UR QL STRIP: NEGATIVE
HOLD SPECIMEN: NORMAL
HOLD SPECIMEN: NORMAL
IMM GRANULOCYTES # BLD: 0 10E3/UL
IMM GRANULOCYTES NFR BLD: 0 %
INTERPRETATION ECG - MUSE: NORMAL
KETONES UR STRIP-MCNC: ABNORMAL MG/DL
KETONES UR STRIP-MCNC: NEGATIVE MG/DL
KETONES UR STRIP-MCNC: NEGATIVE MG/DL
LEUKOCYTE ESTERASE UR QL STRIP: ABNORMAL
LEUKOCYTE ESTERASE UR QL STRIP: ABNORMAL
LEUKOCYTE ESTERASE UR QL STRIP: NEGATIVE
LYMPHOCYTES # BLD AUTO: 1.1 10E3/UL (ref 0.8–5.3)
LYMPHOCYTES NFR BLD AUTO: 21 %
MCH RBC QN AUTO: 31.1 PG (ref 26.5–33)
MCHC RBC AUTO-ENTMCNC: 31.8 G/DL (ref 31.5–36.5)
MCV RBC AUTO: 98 FL (ref 78–100)
MONOCYTES # BLD AUTO: 0.7 10E3/UL (ref 0–1.3)
MONOCYTES NFR BLD AUTO: 13 %
MUCOUS THREADS #/AREA URNS LPF: PRESENT /LPF
NEUTROPHILS # BLD AUTO: 3.2 10E3/UL (ref 1.6–8.3)
NEUTROPHILS NFR BLD AUTO: 60 %
NITRATE UR QL: NEGATIVE
NRBC # BLD AUTO: 0 10E3/UL
NRBC BLD AUTO-RTO: 0 /100
P AXIS - MUSE: 41 DEGREES
PH UR STRIP: 5 [PH] (ref 5–7)
PH UR STRIP: 7 [PH] (ref 5–7)
PH UR STRIP: 7 [PH] (ref 5–7)
PLATELET # BLD AUTO: 178 10E3/UL (ref 150–450)
POTASSIUM SERPL-SCNC: 4 MMOL/L (ref 3.4–5.3)
PR INTERVAL - MUSE: 196 MS
QRS DURATION - MUSE: 134 MS
QT - MUSE: 402 MS
QTC - MUSE: 486 MS
R AXIS - MUSE: -62 DEGREES
RBC # BLD AUTO: 4.11 10E6/UL (ref 3.8–5.2)
RBC #/AREA URNS AUTO: ABNORMAL /HPF
RBC URINE: 1 /HPF
RBC URINE: 2 /HPF
SODIUM SERPL-SCNC: 141 MMOL/L (ref 136–145)
SP GR UR STRIP: 1.01 (ref 1–1.03)
SP GR UR STRIP: 1.02 (ref 1–1.03)
SP GR UR STRIP: >=1.03 (ref 1–1.03)
SQUAMOUS EPITHELIAL: <1 /HPF
SYSTOLIC BLOOD PRESSURE - MUSE: NORMAL MMHG
T AXIS - MUSE: 52 DEGREES
TRANSITIONAL EPI: <1 /HPF
UROBILINOGEN UR STRIP-ACNC: 0.2 E.U./DL
UROBILINOGEN UR STRIP-MCNC: NORMAL MG/DL
UROBILINOGEN UR STRIP-MCNC: NORMAL MG/DL
VENTRICULAR RATE- MUSE: 88 BPM
WBC # BLD AUTO: 5.3 10E3/UL (ref 4–11)
WBC #/AREA URNS AUTO: ABNORMAL /HPF
WBC URINE: 10 /HPF
WBC URINE: 22 /HPF

## 2023-01-01 PROCEDURE — 99285 EMERGENCY DEPT VISIT HI MDM: CPT | Mod: 25

## 2023-01-01 PROCEDURE — 81001 URINALYSIS AUTO W/SCOPE: CPT | Performed by: INTERNAL MEDICINE

## 2023-01-01 PROCEDURE — 81001 URINALYSIS AUTO W/SCOPE: CPT | Mod: ORL | Performed by: NURSE PRACTITIONER

## 2023-01-01 PROCEDURE — 99349 HOME/RES VST EST MOD MDM 40: CPT | Mod: GW | Performed by: NURSE PRACTITIONER

## 2023-01-01 PROCEDURE — 99349 HOME/RES VST EST MOD MDM 40: CPT | Performed by: NURSE PRACTITIONER

## 2023-01-01 PROCEDURE — 81001 URINALYSIS AUTO W/SCOPE: CPT | Performed by: EMERGENCY MEDICINE

## 2023-01-01 PROCEDURE — 70450 CT HEAD/BRAIN W/O DYE: CPT

## 2023-01-01 PROCEDURE — 99350 HOME/RES VST EST HIGH MDM 60: CPT | Performed by: NURSE PRACTITIONER

## 2023-01-01 PROCEDURE — 99349 HOME/RES VST EST MOD MDM 40: CPT | Performed by: INTERNAL MEDICINE

## 2023-01-01 PROCEDURE — 87493 C DIFF AMPLIFIED PROBE: CPT | Mod: ORL | Performed by: NURSE PRACTITIONER

## 2023-01-01 PROCEDURE — 82310 ASSAY OF CALCIUM: CPT | Performed by: EMERGENCY MEDICINE

## 2023-01-01 PROCEDURE — 250N000013 HC RX MED GY IP 250 OP 250 PS 637: Performed by: EMERGENCY MEDICINE

## 2023-01-01 PROCEDURE — 73552 X-RAY EXAM OF FEMUR 2/>: CPT | Mod: RT

## 2023-01-01 PROCEDURE — 99421 OL DIG E/M SVC 5-10 MIN: CPT | Performed by: INTERNAL MEDICINE

## 2023-01-01 PROCEDURE — 87086 URINE CULTURE/COLONY COUNT: CPT | Mod: ORL | Performed by: NURSE PRACTITIONER

## 2023-01-01 PROCEDURE — 73590 X-RAY EXAM OF LOWER LEG: CPT | Mod: RT

## 2023-01-01 PROCEDURE — 36415 COLL VENOUS BLD VENIPUNCTURE: CPT | Performed by: EMERGENCY MEDICINE

## 2023-01-01 PROCEDURE — G0180 MD CERTIFICATION HHA PATIENT: HCPCS | Performed by: INTERNAL MEDICINE

## 2023-01-01 PROCEDURE — G0180 MD CERTIFICATION HHA PATIENT: HCPCS | Performed by: NURSE PRACTITIONER

## 2023-01-01 PROCEDURE — 99214 OFFICE O/P EST MOD 30 MIN: CPT | Performed by: INTERNAL MEDICINE

## 2023-01-01 PROCEDURE — 93005 ELECTROCARDIOGRAM TRACING: CPT | Mod: RTG

## 2023-01-01 PROCEDURE — 85025 COMPLETE CBC W/AUTO DIFF WBC: CPT | Performed by: EMERGENCY MEDICINE

## 2023-01-01 PROCEDURE — 87088 URINE BACTERIA CULTURE: CPT | Performed by: EMERGENCY MEDICINE

## 2023-01-01 RX ORDER — CEPHALEXIN 500 MG/1
500 CAPSULE ORAL 3 TIMES DAILY
Qty: 21 CAPSULE | Refills: 0 | Status: SHIPPED | OUTPATIENT
Start: 2023-01-01 | End: 2023-01-01

## 2023-01-01 RX ORDER — MULTIVITAMIN WITH FOLIC ACID 400 MCG
TABLET ORAL
Qty: 90 TABLET | Refills: 97 | Status: SHIPPED | OUTPATIENT
Start: 2023-01-01

## 2023-01-01 RX ORDER — DULOXETIN HYDROCHLORIDE 30 MG/1
30 CAPSULE, DELAYED RELEASE ORAL DAILY
Qty: 90 CAPSULE | Refills: 3 | Status: SHIPPED | OUTPATIENT
Start: 2023-01-01

## 2023-01-01 RX ORDER — DOCUSATE SODIUM 100 MG/1
100 CAPSULE, LIQUID FILLED ORAL DAILY PRN
COMMUNITY
End: 2023-01-01

## 2023-01-01 RX ORDER — POTASSIUM CHLORIDE 750 MG/1
TABLET, EXTENDED RELEASE ORAL
Qty: 90 TABLET | Refills: 3 | Status: SHIPPED | OUTPATIENT
Start: 2023-01-01 | End: 2023-01-01

## 2023-01-01 RX ORDER — AMLODIPINE BESYLATE 5 MG/1
5 TABLET ORAL DAILY
Qty: 90 TABLET | Refills: 3 | Status: SHIPPED | OUTPATIENT
Start: 2023-01-01

## 2023-01-01 RX ORDER — CIPROFLOXACIN 250 MG/1
250 TABLET, FILM COATED ORAL 2 TIMES DAILY
Qty: 6 TABLET | Refills: 0 | Status: SHIPPED | OUTPATIENT
Start: 2023-01-01 | End: 2023-01-01

## 2023-01-01 RX ORDER — HALOPERIDOL 0.5 MG/1
0.5 TABLET ORAL
COMMUNITY

## 2023-01-01 RX ORDER — MORPHINE SULFATE 30 MG/1
5 TABLET ORAL
COMMUNITY

## 2023-01-01 RX ORDER — MAG HYDROX/ALUMINUM HYD/SIMETH 400-400-40
2 SUSPENSION, ORAL (FINAL DOSE FORM) ORAL DAILY
Qty: 180 CAPSULE | Refills: 3 | Status: SHIPPED | OUTPATIENT
Start: 2023-01-01

## 2023-01-01 RX ORDER — MAG HYDROX/ALUMINUM HYD/SIMETH 400-400-40
2 SUSPENSION, ORAL (FINAL DOSE FORM) ORAL DAILY
COMMUNITY
End: 2023-01-01

## 2023-01-01 RX ORDER — ACETAMINOPHEN 500 MG
500 TABLET ORAL ONCE
Status: COMPLETED | OUTPATIENT
Start: 2023-01-01 | End: 2023-01-01

## 2023-01-01 RX ORDER — AMLODIPINE BESYLATE 5 MG/1
5 TABLET ORAL DAILY
Qty: 90 TABLET | Refills: 3 | OUTPATIENT
Start: 2023-01-01

## 2023-01-01 RX ORDER — BISACODYL 10 MG
10 SUPPOSITORY, RECTAL RECTAL DAILY PRN
COMMUNITY

## 2023-01-01 RX ORDER — CEPHALEXIN 500 MG/1
500 CAPSULE ORAL ONCE
Status: COMPLETED | OUTPATIENT
Start: 2023-01-01 | End: 2023-01-01

## 2023-01-01 RX ORDER — ACETAMINOPHEN 500 MG
1000 TABLET ORAL EVERY MORNING
COMMUNITY
End: 2023-01-01

## 2023-01-01 RX ORDER — DOCUSATE SODIUM 100 MG/1
TABLET ORAL
Qty: 180 TABLET | Refills: 97 | Status: SHIPPED | OUTPATIENT
Start: 2023-01-01 | End: 2023-01-01

## 2023-01-01 RX ORDER — DOCUSATE SODIUM 100 MG/1
200 CAPSULE, LIQUID FILLED ORAL DAILY
Qty: 62 CAPSULE | Refills: 11 | Status: SHIPPED | OUTPATIENT
Start: 2023-01-01

## 2023-01-01 RX ORDER — COLCHICINE 0.6 MG/1
0.6 TABLET ORAL 2 TIMES DAILY
COMMUNITY
Start: 2023-01-01 | End: 2023-01-01

## 2023-01-01 RX ORDER — DOCUSATE SODIUM 100 MG/1
100 CAPSULE, LIQUID FILLED ORAL DAILY PRN
Qty: 31 CAPSULE | Refills: 11 | Status: SHIPPED | OUTPATIENT
Start: 2023-01-01

## 2023-01-01 RX ORDER — LORAZEPAM 0.5 MG/1
0.5 TABLET ORAL
COMMUNITY

## 2023-01-01 RX ORDER — POTASSIUM CHLORIDE 750 MG/1
TABLET, EXTENDED RELEASE ORAL
Qty: 90 TABLET | Refills: 97 | Status: SHIPPED | OUTPATIENT
Start: 2023-01-01

## 2023-01-01 RX ORDER — DOCUSATE SODIUM 100 MG/1
100 CAPSULE, LIQUID FILLED ORAL DAILY
COMMUNITY
End: 2023-01-01

## 2023-01-01 RX ORDER — DULOXETIN HYDROCHLORIDE 30 MG/1
30 CAPSULE, DELAYED RELEASE ORAL DAILY
COMMUNITY
End: 2023-01-01

## 2023-01-01 RX ORDER — POTASSIUM CHLORIDE 750 MG/1
TABLET, EXTENDED RELEASE ORAL
Qty: 31 TABLET | Refills: 0 | Status: SHIPPED | OUTPATIENT
Start: 2023-01-01 | End: 2023-01-01

## 2023-01-01 RX ORDER — AMLODIPINE BESYLATE 10 MG/1
15 TABLET ORAL DAILY
Qty: 90 TABLET | Refills: 3 | OUTPATIENT
Start: 2023-01-01

## 2023-01-01 RX ORDER — PSEUDOEPHED/ACETAMINOPH/DIPHEN 30MG-500MG
TABLET ORAL
Qty: 360 TABLET | Refills: 97 | Status: SHIPPED | OUTPATIENT
Start: 2023-01-01

## 2023-01-01 RX ORDER — SIMETHICONE 125 MG
125 TABLET,CHEWABLE ORAL 2 TIMES DAILY
COMMUNITY

## 2023-01-01 RX ORDER — DOCUSATE SODIUM 100 MG/1
200 CAPSULE, LIQUID FILLED ORAL DAILY
COMMUNITY
End: 2023-01-01

## 2023-01-01 RX ADMIN — CEPHALEXIN 500 MG: 500 CAPSULE ORAL at 18:45

## 2023-01-01 ASSESSMENT — ENCOUNTER SYMPTOMS
ARTHRALGIAS: 1
HEADACHES: 1

## 2023-01-01 ASSESSMENT — ACTIVITIES OF DAILY LIVING (ADL)
ADLS_ACUITY_SCORE: 35
ADLS_ACUITY_SCORE: 35

## 2023-01-03 NOTE — TELEPHONE ENCOUNTER
Patient was placed on increase dose for Raynaud's.  Family reported negative side effects.    Should have 5mg prescription available.    Aviva Woodruff MD  Internal Medicine & Pediatrics  [facility]  She/her

## 2023-01-03 NOTE — TELEPHONE ENCOUNTER
Two amlodipine doses on medication list. Please review and advise. Routing refill request to provider for review/approval because:  Labs out of range:  BP    BP Readings from Last 3 Encounters:   10/27/22 (!) 140/64   09/29/22 124/62   09/01/22 122/62     Ihsan PARISI RN 1/3/2023 at 2:47 PM

## 2023-01-15 NOTE — TELEPHONE ENCOUNTER
potassium chloride ER (K-TAB/KLOR-CON) 10 MEQ CR tablet (Discontinued)    Please send new Rx. We do not have one on file

## 2023-01-16 NOTE — TELEPHONE ENCOUNTER
Rx filled x 1 month. Unclear why this med was discontinued and whether it should continue or not.

## 2023-01-16 NOTE — TELEPHONE ENCOUNTER
Routing refill request to provider for review/approval because:  Drug not active on patient's medication list    Ihsan PARISI RN 1/16/2023 at 7:06 AM

## 2023-02-22 NOTE — TELEPHONE ENCOUNTER
Received call from RN at pt's assisted living  Pt slide out of ed last night.  No apparent injury, did not hit her head    Today pt feels fine    Mabel Ying RN on 2/22/2023 at 1:46 PM

## 2023-03-06 NOTE — ED TRIAGE NOTES
Patient presents to the ED via ambulance following a fall. Per report, patient fell off of toilet and is presenting with right knee pain. Facility states patient has had several falls recently.

## 2023-03-06 NOTE — ED NOTES
Pt ambulated independently with walker to bathroom and back. Pt had no complaints while walking and gait was steady. MD notified.

## 2023-03-06 NOTE — ED PROVIDER NOTES
"  History     Chief Complaint:  Fall       The history is provided by the patient and the EMS personnel.      Lynn Soriano is a 89 year old female with a history of hypertension, osteoporosis, and arthritis who presents via EMS following a fall in her independent living facility with right leg pain. She reports she tried to stand up from the toilet, and one of her legs slipped out from under her. She landed on her right side, hitting her hip on a bar to the side of the toilet. She believes she hit the back of her head during the fall. Denies loss of consciousness. She is not on any blood thinners. She could not get up after falling, and pushed the alert button on her wrist for staff assistance. In the ED, she is not complaining of any pain. She denies visual disturbance including diplopia following the fall. Denies difficulty urinating and dysuria. She reports she has had multiple falls recently, including one a few weeks ago resulting in bruising to the left eye, left jaw, and left knee. She does not believe her legs were too weak to support her during today's fall, but states she has had some trouble with her lower legs lately, stating that the areas below her knees \"feel like they're falling off sometimes.\" She states this sensation has worsened in the last few weeks.    Independent Historian: EMS report the patient complained of right knee pain upon arrival. They report epistaxis earlier, now resolved. They mention that staff have noted a general decline in the patient in the past few weeks, including increasing weakness.     Review of External Notes: None     ROS:  Review of Systems   Musculoskeletal: Positive for arthralgias (right knee).   Neurological: Positive for headaches.   All other systems reviewed and are negative.    Allergies:  Anticoagulant Compound  Aspirin  Penicillins     Medications:    Norvasc   Vitamin D   K-tab  Ditropan  Fosamax  Prilosec  Colace    Past Medical History:    Anemia  Closed " wedge compression fracture of T10 vertebra  GERD  Herpes zoster  Vertebroplasty  Raynaud's syndrome  Recurrent epistaxis  Senile osteoporosis   Hypokalemia  Carpal tunnel syndrome  CKD  Hypertension   Arthritis     Past Surgical History:    IR lumbar epidural steroid injection  IR thoracic vertebroplasty     Family History:    Sister- nephrolithiasis, osteoporosis     Social History:  The patient presents to the ED alone via EMS.  The patient lives at [facility].  PCP: Delon López     Physical Exam     Patient Vitals for the past 24 hrs:   BP Temp Temp src Pulse Resp SpO2   03/06/23 1541 (!) 159/70 -- -- 93 -- 99 %   03/06/23 1429 123/86 98.9  F (37.2  C) Temporal 90 16 97 %        Physical Exam    HEENT:   No scalp hematoma or defect to the bony calvarium.      Royal's and Racoon's sign negative.      Oropharynx is moist, without lesions or trismus.  EYES:  Conjunctiva normal, PERRL    EOMs intact  NECK:   C-spine non-tender with full ROM.      No bony step-off to cervical spine.   CV:    Regular rate and rhythm.     No murmurs, rubs or gallops.    PULM:  Clear to auscultation bilateral.      No respiratory distress.      No subcutaneous emphysema or crepitus.  ABD:   Soft, non-tender, non-distended.      No rebound or guarding.  MSK:    Pelvis stable    RLE     No deformity     Mild tenderness to the femur and lower leg but no limited range of motion of the hip, knee or ankle    Upper extremities and LLE taken through full ROM without significant pain or limited ROM.  LYMPH:  No cervical lymphadenopathy.  NEURO:  Alert and oriented x 3. GCS 15.      CN II-XII intact, speech is clear with no aphasia.      Strength is 5/5 in all 4 extremities.  Sensation is intact.      Normal muscular tone, no tremor.  SKIN:   Warm, dry and intact.    PSYCH:   Mood is good and affect is appropriate.      Emergency Department Course   ECG:  ECG results from 03/06/23   EKG 12-lead, tracing only     Value     Systolic Blood Pressure     Diastolic Blood Pressure     Ventricular Rate 88    Atrial Rate 88    VT Interval 196    QRS Duration 134        QTc 486    P Axis 41    R AXIS -62    T Axis 52    Interpretation ECG      Sinus rhythm  Right bundle branch block  Left anterior fascicular block  Bifascicular block   Moderate voltage criteria for LVH, may be normal variant  Abnormal ECG  No previous ECGs available         Imaging:  XR Tibia and Fibula Right 2 Views   Final Result   IMPRESSION:   1.  No fracture.   2.  Moderate right knee degenerative arthrosis.   3.  Bone demineralization.   4.  Atherosclerotic and vascular calcification.      KYLE ROSARIO MD            SYSTEM ID:  [id number]      XR Femur Right 2 Views   Final Result   IMPRESSION:   1.  No fracture or joint malalignment.   2.  Moderate right knee degenerative arthrosis.   3.  Degenerative changes in the lower lumbar spine and right   sacroiliac joint noted.   4.  Bone demineralization.   5.  Atherosclerotic calcification.      KYLE ROSARIO MD            SYSTEM ID:  [id number]      Head CT w/o contrast   Final Result   IMPRESSION:    1. No acute intracranial abnormality.   2. Chronic changes as detailed.      ELIZABETH HERNANDEZ MD            SYSTEM ID:  [id number]         Report per radiology    Laboratory:  Labs Ordered and Resulted from Time of ED Arrival to Time of ED Departure   BASIC METABOLIC PANEL - Abnormal       Result Value    Sodium 141      Potassium 4.0      Chloride 104      Carbon Dioxide (CO2) 27      Anion Gap 10      Urea Nitrogen 17.8      Creatinine 0.62      Calcium 9.4      Glucose 101 (*)     GFR Estimate 82     ROUTINE UA WITH MICROSCOPIC REFLEX TO CULTURE - Abnormal    Color Urine Light Yellow      Appearance Urine Clear      Glucose Urine Negative      Bilirubin Urine Negative      Ketones Urine Negative      Specific Gravity Urine 1.011      Blood Urine Negative      pH Urine 7.0      Protein Albumin Urine Negative       Urobilinogen Urine Normal      Nitrite Urine Negative      Leukocyte Esterase Urine Moderate (*)     Mucus Urine Present (*)     RBC Urine 1      WBC Urine 22 (*)     Squamous Epithelials Urine <1     CBC WITH PLATELETS AND DIFFERENTIAL    WBC Count 5.3      RBC Count 4.11      Hemoglobin 12.8      Hematocrit 40.2      MCV 98      MCH 31.1      MCHC 31.8      RDW 13.2      Platelet Count 178      % Neutrophils 60      % Lymphocytes 21      % Monocytes 13      % Eosinophils 5      % Basophils 1      % Immature Granulocytes 0      NRBCs per 100 WBC 0      Absolute Neutrophils 3.2      Absolute Lymphocytes 1.1      Absolute Monocytes 0.7      Absolute Eosinophils 0.3      Absolute Basophils 0.0      Absolute Immature Granulocytes 0.0      Absolute NRBCs 0.0     URINE CULTURE      Emergency Department Course & Assessments:  ED Course as of 23 1849   Mon Mar 06, 2023   1424 I obtained the history and examined the patient as noted above.     I rechecked and updated the patient and the patient's family.    I rechecked and updated the patient.           Interventions:  Medications   acetaminophen (TYLENOL) tablet 500 mg (500 mg Oral Not Given 3/6/23 151)   cephALEXin (KEFLEX) capsule 500 mg (500 mg Oral $Given 3/6/23 1845)        Independent Interpretation (X-rays, CTs, rhythm strip):  I independently reviewed x-ray of the femur and tibia/fibula with no acute fracture or dislocation noted     Consultations/Discussion of Management or Tests:  None.    Social Determinants of Health affecting care:  None     Disposition:  The patient was discharged to home.     Impression & Plan    Medical Decision Makin-year-old female presented to the ED with generalized weakness resulting in frequent falls, closed head injury and right leg pain.  CT scan of her head is negative for acute pathology.  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  Cervical spine was cleared by Nexus criteria.  X-rays of the right lower extremity are negative and consistent with soft tissue contusion.    In regards to her increased weakness and frequent falls, basic laboratory studies and EKG unremarkable.  Urinalysis is equivocal and family members report frequent UTIs.  Patiently placed on cephalexin as prior urine cultures have been sensitive to cephalosporins.  This may be the source of her increased weakness but also believe weakness is related to deconditioning and advanced age.  Patient to follow-up with primary care physician and return to ED for any worsening symptoms    Diagnosis:    ICD-10-CM    1. Generalized muscle weakness  M62.81 Physical Therapy Referral      2. Closed head injury, initial encounter  S09.90XA       3. Contusion of right lower extremity, initial encounter  S80.11XA       4. Abnormal urinalysis  R82.90            Discharge Medications:  New Prescriptions    CEPHALEXIN (KEFLEX) 500 MG CAPSULE    Take 1 capsule (500 mg) by mouth 3 times daily for 7 days      Scribe Disclosure:  I, Meg Campos, am serving as a scribe at 5:39 PM on 3/6/2023 to document services personally performed by Juma Hurtado MD based on my observations and the provider's statements to me.     3/6/2023   Juma Hurtado MD Matthews, Jeremiah R, MD  03/06/23 1952

## 2023-03-07 NOTE — DISCHARGE INSTRUCTIONS
Discharge Instructions  Urinary Tract Infection  You or your child have been diagnosed with a urinary tract infection, or UTI. The urinary tract includes the kidneys (which make urine/pee), ureters (the tubes that carry urine/pee from the kidneys to the bladder), the bladder (which stores urine/pee), and urethra (the tube that carries urine/pee out of the bladder). Urinary tract infections occur when bacteria travel up the urethra into the bladder (bladder infection) and, in some cases, from there into the kidneys (kidney infection).  Generally, every Emergency Department visit should have a follow-up clinic visit with either a primary or a specialty clinic/provider. Please follow-up as instructed by your emergency provider today.  Return to the Emergency Department if:  You or your child have severe back pain.  You or your child are vomiting (throwing up) so that you cannot take your medicine.  You or your child have a new fever (had not previously had a fever) over 101 F.  You or your child have confusion or are very weak, or feel very ill.  Your child seems much more ill, will not wake up, will not respond right, or is crying for a long time and will not calm down.  You or your child are showing signs of dehydration. These signs may include decreased urination (pee), dry mouth/gums/tongue, or decreased activity.    Follow-up with your provider:   Children under 24 months need to be seen by their regular provider within one week after a diagnosis of a UTI. It may be necessary to do some more tests to look at the child s kidney or bladder.  You should begin to feel better within 24 - 48 hours of starting your antibiotic; follow-up with your regular clinic/doctor/provider if this is not the case.    Treatment:   You will be treated with an antibiotic to kill the bacteria. We have to make an educated guess, based on what we know about common bacteria and antibiotics, as to which antibiotic will work for your  "infection. We will be correct most times but there will be some cases where the antibiotic chosen is not correct (see urine cultures below).  Take a pain medication such as acetaminophen (Tylenol ) or ibuprofen (Advil , Motrin , Nuprin ).  Phenazopyridine (Pyridium , Uristat ) is a prescription medication that numbs the bladder to reduce the burning pain of some UTIs.  The same medication is available in a non-prescription version (Azo-Standard , Urodol ). This medication will change the color of the urine and tears (usually blue or orange). If you wear contacts, do not wear them while taking this medication as they may be stained by the medication.    Urine Cultures:  If indicated, a urine culture may have been performed today. This test generally takes 24-48 hours to complete so the results are not known at this time. The results can confirm that an infection is present but also determine which antibiotic is effective for the specific bacteria that is causing the infection. If your urine culture shows that the antibiotic you were given today will not work to treat your infection, we will attempt to contact you to make arrangements to change the antibiotic. If the culture confirms that the antibiotic is effective for your infection, you will not be contacted. We often recommend follow-up with your regular physician/provider on the culture results regardless of this process.    Antibiotic Warning:   If you have been placed on antibiotics - watch for signs of allergic reaction.  These include rash, lip swelling, difficulty breathing, wheezing, and dizziness.  If you develop any of these symptoms, stop the antibiotic immediately and go to an emergency room or urgent care for evaluation.    Probiotics: If you have been given an antibiotic, you may want to also take a probiotic pill or eat yogurt with live cultures. Probiotics have \"good bacteria\" to help your intestines stay healthy. Studies have shown that probiotics " help prevent diarrhea and other intestine problems (including C. diff infection) when you take antibiotics. You can buy these without a prescription in the pharmacy section of the store.   If you were given a prescription for medicine here today, be sure to read all of the information (including the package insert) that comes with your prescription.  This will include important information about the medicine, its side effects, and any warnings that you need to know about.  The pharmacist who fills the prescription can provide more information and answer questions you may have about the medicine.  If you have questions or concerns that the pharmacist cannot address, please call or return to the Emergency Department.   Remember that you can always come back to the Emergency Department if you are not able to see your regular provider in the amount of time listed above, if you get any new symptoms, or if there is anything that worries you.    Discharge Instructions  Head Injury    You have been seen today for a head injury. Your evaluation included a history and physical examination. You may have had a CT (CAT) scan performed, though most head injuries do not require a scan. Based on this evaluation, your provider today does not feel that your head injury is serious.    Generally, every Emergency Department visit should have a follow-up clinic visit with either a primary or a specialty clinic/provider. Please follow-up as instructed by your emergency provider today.  Return to the Emergency Department if:  You are confused or you are not acting right.  Your headache gets worse or you start to have a really bad headache even with your recommended treatment plan.  You vomit (throw up) more than once.  You have a seizure.  You have trouble walking.  You have weakness or paralysis (cannot move) in an arm or a leg.  You have blood or fluid coming from your ears or nose.  You have new symptoms or anything that worries  you.    Sleeping:  It is okay for you to sleep, but someone should wake you up if instructed by your provider, and someone should check on you at your usual time to wake up.     Activity:  Do not drive for at least 24 hours.  Do not drive if you have dizzy spells or trouble concentrating, or remembering things.  Do not return to any contact sports until cleared by your regular provider.     MORE INFORMATION:    Concussion:  A concussion is a minor head injury that may cause temporary problems with the way the brain works. Although concussions are important, they are generally not an emergency or a reason that a person needs to be hospitalized. Some concussion symptoms include confusion, amnesia (forgetful), nausea (sick to your stomach) and vomiting (throwing up), dizziness, fatigue, memory or concentration problems, irritability and sleep problems. For most people, concussions are mild and temporary but some will have more severe and persistent symptoms that require on-going care and treatment.  CT Scans: Your evaluation today may have included a CT scan (CAT scan) to look for things like bleeding or a skull fracture (broken bone).  CT scans involve radiation and too many CT scans can cause serious health problems like cancer, especially in children.  Because of this, your provider may not have ordered a CT scan today if they think you are at low risk for a serious or life threatening problem.    If you were given a prescription for medicine here today, be sure to read all of the information (including the package insert) that comes with your prescription.  This will include important information about the medicine, its side effects, and any warnings that you need to know about.  The pharmacist who fills the prescription can provide more information and answer questions you may have about the medicine.  If you have questions or concerns that the pharmacist cannot address, please call or return to the Emergency  Department.     Remember that you can always come back to the Emergency Department if you are not able to see your regular provider in the amount of time listed above, if you get any new symptoms, or if there is anything that worries you.

## 2023-03-08 NOTE — TELEPHONE ENCOUNTER
See the  message from pt. Called pt at [phone]. Explained to her that the [facility] preliminary report is back but we are still awaiting sensitivity report.    Advised to continue keflex for now. Pt agrees to the plan. No other questions or concerns.    MELIZA Weaver  Patient Advocate Liason (PAL)  [employer]  Ph. [phone] / Fax. [phone]

## 2023-03-09 NOTE — TELEPHONE ENCOUNTER
[facility] () Emergency Department/Urgent Care Lab result notification    [place] ED lab result protocol used  Urine culture    Reason for call  Notify of lab results, assess symptoms, review ED providers recommendations/discharge instructions (if necessary) and advise per ED lab result f/u protocol    Lab Result (including Rx patient on, if applicable)  Final Urine Culture Report on 3/8/23  [facility] Emergency Dept discharge antibiotic prescribed: Cephalexin (Keflex) 500 mg capsule, 1 capsule (500 mg) by mouth 3 times daily for 7 days.  #1. Bacteria, >100,000 CFU/ML Citrobacter freundii,  is [RESISTANT] to antibiotic.   Recommendations in treatment per [facility] ED lab result Urine Culture protocol.    Information table from Emergency Dept/Urgent Care Provider visit on 3/6/23  Symptoms reported at ED visit (Chief complaint, HPI) Chief Complaint:  Fall        The history is provided by the patient and the EMS personnel.      Lynn Soriano is a 89 year old female with a history of hypertension, osteoporosis, and arthritis who presents via EMS following a fall in her independent living facility with right leg pain. She reports she tried to stand up from the toilet, and one of her legs slipped out from under her. She landed on her right side, hitting her hip on a bar to the side of the toilet. She believes she hit the back of her head during the fall. Denies loss of consciousness. She is not on any blood thinners. She could not get up after falling, and pushed the alert button on her wrist for staff assistance. In the ED, she is not complaining of any pain. She denies visual disturbance including diplopia following the fall. Denies difficulty urinating and dysuria. She reports she has had multiple falls recently, including one a few weeks ago resulting in bruising to the left eye, left jaw, and left knee. She does not believe her legs were too weak to support her during today's fall,  "but states she has had some trouble with her lower legs lately, stating that the areas below her knees \"feel like they're falling off sometimes.\" She states this sensation has worsened in the last few weeks.   Significant Medical hx, if applicable (i.e. CKD, diabetes) Overactive bladder   Allergies Allergies   Allergen Reactions     Anticoagulant Compound      No anticoagulants     Aspirin      Penicillins       Weight, if applicable Wt Readings from Last 2 Encounters:   10/27/22 57.7 kg (127 lb 1.6 oz)   22 57.7 kg (127 lb 1.6 oz)      Coumadin/Warfarin [Yes /No] NO   Creatinine Level (mg/dl) Creatinine   Date Value Ref Range Status   2023 0.62 0.51 - 0.95 mg/dL Final   2021 0.67 0.52 - 1.04 mg/dL Final      Creatinine clearance (ml/min), if applicable Serum creatinine: 0.62 mg/dL 23 1441  Estimated creatinine clearance: 50.5 mL/min   Pregnant (Yes/No/NA) NA   Breastfeeding (Yes/No/NA) NA   ED providers Impression and Plan (applicable information) Medical Decision Makin-year-old female presented to the ED with generalized weakness resulting in frequent falls, closed head injury and right leg pain.  CT scan of her head is negative for acute pathology.  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  Cervical spine was cleared by Nexus criteria.  X-rays of the right lower extremity are negative and consistent with soft tissue contusion.     In regards to her increased weakness and frequent falls, basic laboratory studies and EKG unremarkable.  Urinalysis is equivocal and family members report frequent UTIs.  Patiently placed on cephalexin as prior urine cultures have been sensitive to cephalosporins.  This may be the source of her increased weakness but also believe weakness is related to deconditioning and advanced age.  Patient to follow-up with primary care " "physician and return to ED for any worsening symptoms     Diagnosis:      ICD-10-CM     1. Generalized muscle weakness  M62.81 Physical Therapy Referral       2. Closed head injury, initial encounter  S09.90XA         3. Contusion of right lower extremity, initial encounter  S80.11XA         4. Abnormal urinalysis  R82.90               Discharge Medications:       New Prescriptions     CEPHALEXIN (KEFLEX) 500 MG CAPSULE    Take 1 capsule (500 mg) by mouth 3 times daily for 7 days      Scribe Disclosure:  I, Meg Campos, am serving as a scribe at 5:39 PM on 3/6/2023 to document services personally performed by Juma Hurtado MD based on my observations and the provider's statements to me.      3/6/2023   Juma Hurtado MD Matthews, Jeremiah R, MD  03/06/23 1952     ED diagnosis  Generalized muscle weakness  Closed head injury, initial encounter  Contusion of right lower extremity, initial encounter  Abnormal urinalysis   ED provider  Juma Hurtado MD        RN Assessment (Patient s current Symptoms), include time called.    3:23 PM patient has reached out to PCP - who advised ED to address if possible - writer reached out to patient preferred number - Norma gil answered and calls Lynn/Ivonne (daughter) to discuss lab results - norma reports she is very incontinence  Overall: \"I'm fine as far as I know\"  Fever:  No  Genitourinary symptoms: no pain, burning, frequency, urgency  Flank pain:  None  Nausea/vomiting:  None    RN Recommendations/Instructions per [facility] ED lab result protocol  Patient notified of lab result and treatment recommendations. STOP Keflex and START Rx for Ciprofloxacin (Cipro) 250 mg tablet, 1 tablet (250 mg) by mouth 2 times daily for 7 days. sent to [Pharmacy - [facility] [place] ].  RN reviewed information about probiotic, UTI Prevention, when to return to ED for worsening fevers, flank pain, nausea/vomiting, confusion, weakness/falls - all questions " answered family verbalized understanding and agrees with plan.    Please Contact your PCP clinic or return to the Emergency department if your:    Symptoms return.    Symptoms do not improve after 3 days on antibiotic.    Symptoms do not resolve after completing antibiotic.    Symptoms worsen or other concerning symptom's.    PCP follow-up Questions asked: YES       Jennifer Haney RN  [facility]  Emergency Dept Lab Result RN  Ph# [phone]     Copy of Lab result   Urine Culture  Order: [id number] - Reflex for Order [id number]   Status: Final result      Visible to patient: Yes (seen)     Specimen Information: Urine, Midstream    2 Result Notes  Culture >100,000 CFU/mL Citrobacter freundii complex Abnormal             Resulting Agency: [facility]     Susceptibility    Citrobacter freundii complex (1)    Antibiotic Interpretation Sensitivity  Method Status    Ampicillin Resistant   STEPHANIE Final     Intrinsically Resistant       Ampicillin/ Sulbactam Resistant   STEPHANIE Final     Intrinsically Resistant       Piperacillin/Tazobactam Susceptible <=4 ug/mL STEPHANIE Final    Cefazolin Resistant >=64 ug/mL STEPHANIE Final     Cefazolin STEPHANIE breakpoints are for the treatment of uncomplicated urinary tract infections. For the treatment of systemic infections, please contact the laboratory for additional testing.   Intrinsically Resistant       Cefoxitin Resistant   STEPHANIE Final     Intrinsically Resistant       Ceftazidime Susceptible <=1 ug/mL STEPHANIE Final    Ceftriaxone Susceptible <=1 ug/mL STEPHANIE Final    Cefepime Susceptible <=1 ug/mL STEPHANIE Final    Gentamicin Susceptible <=1 ug/mL STEPHANIE Final    Tobramycin Susceptible <=1 ug/mL STEPHANIE Final    Ciprofloxacin Susceptible <=0.25 ug/mL STEPHANIE Final    Levofloxacin Susceptible <=0.12 ug/mL STEPHANIE Final    Nitrofurantoin Susceptible <=16 ug/mL STEPHANIE Final    Trimethoprim/Sulfamethoxazole Susceptible <=1/19 ug/mL STEPHANIE Final          Specimen Collected: 03/06/23  5:55 PM Last Resulted:  03/08/23  9:12 PM

## 2023-03-09 NOTE — TELEPHONE ENCOUNTER
Daughter, Mary Jane, returned call and LM to call her back.  I did call her but no answer-LMTCB.  I did say we would try calling back in the am to clarify instructions.  RIK Oden RN

## 2023-03-09 NOTE — TELEPHONE ENCOUNTER
Please see My Chart message of 03/09 for continuation of this discussion.  Urine culture is positive for UTI.  RIK Oden RN

## 2023-03-09 NOTE — TELEPHONE ENCOUNTER
Alexandria calling from [facility] for orders for PT.  Wanting PT referral.  States Lynn has had a couple of falls.  Fell Monday afternoon 3/6/23 in her bathroom and went to ER.  Also fell in February.  Family asking for PT.  Call back to [phone] fax [phone].  Yuliya Elise RN

## 2023-03-09 NOTE — TELEPHONE ENCOUNTER
Patient was seen in ER on 03/06 and treated for UTI with seven day course of Cephalexin 500 mg three times daily.  Urine culture is final and is positive.  Organism is resistant to Cefazolin and Cefoxitin  Will discuss with provider.  RIK Oden RN

## 2023-03-09 NOTE — TELEPHONE ENCOUNTER
It looks like patient's UTI may be resistant to the antibiotic she was prescribed, typically the ER follows up on this.     However, if patient hasn't heard from ER and symptoms are not improved I would recommend we consider switching antibiotics. I would probably recommend switching to ciprofloxacin 250mg BID x7 days.    Priscilla Smith MD  Internal Medicine-Pediatrics

## 2023-03-09 NOTE — TELEPHONE ENCOUNTER
I am fine for physical therapy to be ordered for patient.    Priscilla Smith MD  Internal Medicine-Pediatrics

## 2023-03-09 NOTE — TELEPHONE ENCOUNTER
ZANA.  Phone was answered by another family member.  She will locate Mary Jane and ask her to call us.  Patient was not available for an update on how she is feeling-patient is playing [employer].  RIK Oden RN

## 2023-03-10 NOTE — TELEPHONE ENCOUNTER
Daughter, Mary Jane, called back.  For appointment on Monday with Dr. Smith (please see #1 & 2)-  1. Mary Jane, has questions about NeuroPsych testing for patient vs OT evaluation for dementia.  About one year ago, patient had OT eval done that indicated dementia, but patient hasn't had a diagnosis of dementia.  Patient resides at Assisted Living in [facility], and she is on the list for their dementia unit, however, she needs a diagnosis of dementia to be admitted to the unit.  Patient needs step by step directions for the completion of tasks.  Currently a family member has been staying with patient.   However, on 03/21, patient will more than likely be living on her own again-in Assisted Living.  Mary Jane has requested more services from [facility], such as assistance with toileting, and escorting to the dining room.    2. Patient also has weakness in her hands and is asking about an EMG?  Patient has seen Rheumatology and he feels the weakness in her hands may be due to a Neurological cause.    RN information-  Advised Mary Jane that Urine culture is positive for UTI, and treatment recommendations that were made.   States that patient and patient's daughter, Ivonne, received a call from a nurse from the ER yesterday afternoon and patient has started on the new antibiotic last night.  She has had increased confusion and weakness.   However, unsure if the increased confusion is due to the dementia or possibly the infection.  Patient has had increasingly more difficulty transitioning from sitting to standing, and has difficulty pulling up her pants.  Physical therapy has been ordered at [facility] per separate encounter.  No further questions.  Will call back if any other questions or concerns.  RIK Oden RN

## 2023-03-10 NOTE — TELEPHONE ENCOUNTER
Alexandria from [facility] LM yesterday at 4:22 pm requesting a call back at [phone].    Hand faxed the PT referral placed by the hospitalist on 3/6/23. Also LM that I have faxed the referral.     MELIZA Weaver  Patient Advocate Liason (PAL)  [facility]  Ph. [phone] / Fax. [phone]

## 2023-03-13 PROBLEM — I10 BENIGN ESSENTIAL HYPERTENSION: Status: ACTIVE | Noted: 2023-01-01

## 2023-03-13 NOTE — TELEPHONE ENCOUNTER
Spoke with daughter (c2c) and rescheduled with a different provider.    Liliana Shirley on 3/13/2023 at 8:16 AM

## 2023-03-13 NOTE — PROGRESS NOTES
Assessment & Plan       ICD-10-CM    1. Falls frequently  R29.6 Home Care Referral      2. Raynaud's disease without gangrene  I73.00 Home Care Referral      3. Recurrent UTI  N39.0       4. Right leg weakness  R29.898 Home Care Referral      5. Benign essential hypertension  I10         Numerous issues reviewed today. Main concern is an increased number of falls in the past 2 weeks. I believe that PT would be helpful to prevent future falls. She is homebound and has difficulty walking long distances, so home PT ordered.  She also would benefit from OT for home safety evaluation, review of ADL's and repeat assessment of her cognitive function.  No other medication changes were made today.  Finish out cipro for UTI. Of note the rx was updated to 7 days total for cipro.     Hill Anand MD  [facility] BERYL Bailey is a 89 year old accompanied by 2 of her daughters, presenting for the following health issues:  Follow Up      History of Present Illness       Hypertension: She presents for follow up of hypertension.  She does check blood pressure  regularly outside of the clinic. Outside blood pressures have been over 140/90. She follows a low salt diet.     Vascular Disease:  She presents for follow up of vascular disease.  She never takes nitroglycerin. She is not taking daily aspirin.    She eats 2-3 servings of fruits and vegetables daily.She consumes 1 sweetened beverage(s) daily.She exercises with enough effort to increase her heart rate 9 or less minutes per day.  She exercises with enough effort to increase her heart rate 3 or less days per week. She is missing 1 dose(s) of medications per week.  She is not taking prescribed medications regularly due to remembering to take.     Here with 2 of her daughters today.    Main reason for today's visit is 2 falls within the past few weeks.  The first was from her bed, more of a slip to the floor. She injured her face on a bedrail on  "her bed.  The second was when she was in the bathroom. Went to get up from the toilet and felt her right leg give way. She fell backward and landed on her right side. This fall prompted an ED visit.  Reviewed imagining and lab results from the ED visit.     No falls since she went back home. Is currently in a senior living facility. She does have some assistance for certain ADL's.    Lynn has a longstanding history of Raynaud's. She takes amlodipine on a daily basis. This has been helpful in decreasing but has not eliminated her symptoms. She has been followed by a rheumatologist. There is question of possible neuropathy. Hands feel cold much of the time. She uses hand warmers which do help. Her right hand has decreased strength compared to the left.     She has significant limitation to the movement of her right shoulder. This is the result of a fall a few years ago. She has not had imaging or orthopedic evaluation for her right shoulder.     She also has weakness of the thigh abductors, more right than left.     Also hx of recurrent UTI. Is currently taking cipro for UTI dx in the ED.     HTN. BP has been controlled.             Objective    /48 (BP Location: Right arm, Patient Position: Sitting, Cuff Size: Adult Regular)   Pulse 66   Temp 97.1  F (36.2  C) (Temporal)   Resp 14   Ht 1.499 m (4' 11\")   Wt 59 kg (130 lb)   BMI 26.26 kg/m    Body mass index is 26.26 kg/m .  Physical Exam   GEN: no distress  SKIN: several purpuric regions, frederick on the forearms  HEENT: PERRL. EOMI. Bruising left cheek.  NECK: Supple  LUNGS: Clear to auscultation bilaterally. No rhonchi, rales, wheezes or retractions.  CV: RRR. No M. Pulses 2+ eliud radial.  ABD: BS+.  S, NT, ND.  EXTR/NEURO: Right hand  4/5. 5/5  left. Right thigh abduction 4/5. Left thigh abduction 4-5/5. Normal plantar and dorsiflexion eliud ankles. 1+ edema pretibial bilaterally despite compression stocking use.   PSYCH: Normal affect. Well groomed. " Good eye contact.

## 2023-03-13 NOTE — TELEPHONE ENCOUNTER
KEITH for Dr. Anand-Daughter sent My Chart message that she would like to discuss the following at the visit today-  The need for physical therapy and occupational therapy home health evaluations for her?      We were also going to ask you to do a cognitive assessment because we feel that she has cognitive changes and she would need a diagnosis of either of a mild cognitive impairment or dementia in order to be excepted into the memory care unit at [facility], if that happened in the future.   Her feet are more swollen, and we wanted to have that checked out also as that is also affecting her mobility and safety during ambulation  Thank  You.  Kellie SPEARS  Patient Advocate Liason (PAL)  [facility]

## 2023-03-14 NOTE — TELEPHONE ENCOUNTER
Order placed for home care yesterday at office appointment for Physical therapy and OT.  Per phone message of 03/09-MELIZA Ibrahim from [facility], was calling for orders for Physical therapy.  LM for [place] to clarify if they are providing this service, and if not, will fax to [facility] ([phone]).  RIK Oden RN

## 2023-03-14 NOTE — TELEPHONE ENCOUNTER
I received a call back from MELIZA Cruz at [facility], and they do not offer Physical therapy or OT services.  I received a My Chart message that patient has been contacted by Physical therapist with [facility] to set up an appointment.  RIK Oden RN

## 2023-03-14 NOTE — TELEPHONE ENCOUNTER
Patient had follow-up appointment with Dr. Anand yesterday.  Please see visit note for recommendations.  RIK dOen RN

## 2023-03-16 NOTE — TELEPHONE ENCOUNTER
Call placed to JOSE ANTONIO Maynard with [facility] home care.     -Left message to call back to PAL RN.     Awaiting call back.     Routing to PAL RN in-basket    MELIZA Emmanuel (Patient Advocate Liaison)  [facility]

## 2023-03-16 NOTE — TELEPHONE ENCOUNTER
Call placed to JOSE ANTONIO Maynard with [facility] care.     - Left message to call back to ROBY RN.    MELIZA Emmanuel (Patient Advocate Liaison)  [facility]

## 2023-03-16 NOTE — TELEPHONE ENCOUNTER
Jasmin from [facility] [phone], calling for verbal orders for PT  2x a week for 1 weeks  1x a week for 3 weeks  1x everyother week for 4 weeks  Also for OT belloal

## 2023-03-16 NOTE — TELEPHONE ENCOUNTER
I am one of Dr. Smith's partners and am covering for her while she is out of the office.    Approval given for physical therapy and occupational therapy.    Aviva Woodruff MD  Internal Medicine & Pediatrics  [facility]  She/her

## 2023-03-16 NOTE — TELEPHONE ENCOUNTER
Dr. Huang Maynard, PT from [facility] home care orders.     PT orders:    2 x a week for 1 week  1 x a week for 3 weeks  1 x every other week for 4 weeks.     OT eval and treat.     Please review and advise.      Routed to PCP    Priscilla GAMBOA RN   PAL (Patient Advocate Liaison)  [facility]

## 2023-03-17 NOTE — TELEPHONE ENCOUNTER
"Pt's daughter(Ivonne) LM that she has been staying with pt since she took a fall on 3/7. Pt has been experiencing constipation for couple of days, tried senna & stool softner. Requesting a call back at [phone] to discuss this further.     Called daughter back(no CTC in file). None of pt's personal or medical info were provided to Ivonne.    She has been helping her mom lately. Pt is unable to have a BM for couple of days, last normal BM was about 3 days ago(little hard). Pt notified her that she has been dealing with constipation & stool impaction for years now & pt has been \"digging in\" to have a BM. When daughter helped her in the bathroom, noticed that she has a \"huge impaction\" near the rectum. Pt can feel the impaction which is very uncomfortable & unable to sit flat on her bottom.     Pt had 3 doses of colace & 2 doses of senna for the past 2 days. Had 2 doses of colace, 1 dose of senna & capful of miralax this morning. Appetite has been great, able to keep food & fluids down. Daughter is trying to push extra fluids since yesterday. Haven't tried enema. Pt finished cipro yesterday. Denies blood in stool or abdominal pain.     Daughter would like to know how the impaction can be removed? Please advise.     MELIZA Weaver  Patient Advocate Liason (PAL)  [facility]  Ph. [phone] / Fax. [phone]          "

## 2023-03-17 NOTE — TELEPHONE ENCOUNTER
Called daughter back & went over 's recommendation. She agrees for the plan.    MELIZA Weaver  Patient Advocate Liason (PAL)  [facility]  Ph. [phone] / Fax. [phone]

## 2023-03-17 NOTE — TELEPHONE ENCOUNTER
Called Aleyda back at [phone] & left detailed message providing verbal ok for the requested orders.    MELIZA Weaver  Patient Advocate Liason (PAL)  [facility]  Ph. [phone] / Fax. [phone]

## 2023-03-17 NOTE — TELEPHONE ENCOUNTER
She should try either a suppository or Fleets enema.   Manually disimpact what they can first.    If these are not helpful and her pain persists, she may need to be seen in the Urgency Room or ER.

## 2023-03-23 NOTE — TELEPHONE ENCOUNTER
The Home Care/Assisted Living/Nursing Facility is calling regarding an established patient.  Has the patient seen Home Care in the past or is currently residing in Assisted Living or Nursing Facility? Yes.     Iliana calling from [facility] requesting the following orders that are within the Home Care, Assisted Living or Nursing Home Eval and Treatment standing order and can be signed as standing order signature required by RN.    OT for 4 visits x 7 weeks.     Preferred Call Back Number: [phone]    Home Care Visits Continuation    Any additional Orders:  Are there any orders requested, not stated above, that are outside of the standing order and must be routed to a licensed practitioner for approval?    No    Writer has verified Requestor will send fax to have orders signed.    Routed to Dr. Smith as KEITH.

## 2023-04-04 NOTE — TELEPHONE ENCOUNTER
"Assisted Living RN calls to report patient fall on 3/6/23 at 13:30.    Evaluated at Emergency Department.  Diagnosis with urninary tract infection (UTI). Rx with Keflex.    Reported \"right leg and knee hurt\" yesterday but today reports no pain.    VSS.    Does provider want follow up appointment?    Liliana Brar RN       "
I see that pt has an appointment with  on 3/13(Mon).     Called pt at [phone]. Pt states that her R lower leg is little swollen than the L leg, but denies any pain, redness, warm to touch, open sores or lumps. Also denies dizziness, HA, lethargy, confusion or other concerns. Able to keep food & fluids down. Is tolerating abx(Keflex) well.     Offered sooner appointment to take a look ather leg, but pt declined. She would like to keep her existing appointment. Will reach us with any concerning sx's.     MELIZA Weaver  Patient Advocate Liason (PAL)  [facility]  Ph. [phone] / Fax. [phone]          
If patient otherwise doing well, I don't know that follow-up is indicated at that time. However, ED visit noted decline in functionality (weakness, etc); not sure if patient or family wants to discuss this or goals of care. If so, would arrange for follow-up but doesn't need to be urgent.    Priscilla Smith MD  Internal Medicine-Pediatrics    
DASH Diet

## 2023-04-13 NOTE — TELEPHONE ENCOUNTER
The Home Care/Assisted Living/Nursing Facility is calling regarding an established patient.  Has the patient seen Home Care in the past or is currently residing in Assisted Living or Nursing Facility? Yes.     Erika, Physical therapist, calling from [facility] requesting the following orders that are within the Home Care, Assisted Living or Nursing Home Eval and Treatment standing order and can be signed as standing order signature required by RN.  Erika just completed a reassessment and doesn't feel that patient is ready to progress to bi-weekly visits in two weeks.    Requesting orders to continue weekly visits for 4 weeks and then will reassess.  Verbal approval given for these orders per protocol.    Preferred Call Back Number: [phone]    Home Care Visits Continuation and PT/OT/Speech Therapy-Physical therapy     Any additional Orders:  Are there any orders requested, not stated above, that are outside of the standing order and must be routed to a licensed practitioner for approval?    No    Writer has verified Requestor will send fax to have orders signed.    Erika reports that patient is complaining of 8/10 pain in her back, right leg, and bilateral hands.   Has been experiencing pain since evaluation for falls in the ER on 03/06, however, the level of pain seems to be increasing.  Physical therapist reports that she is in the wheelchair most of the day and she now needs assistance with all transfers.   Prior to the falls, patient was able to walk.   Today during the re-assessment, she required minimum to moderate assistance to stand from the wheelchair.  Patient resides in Assisted Living and an aide helps her transfer to her chair in the am, and with toileting transfers.   Patient goes to a Day program, and then the aide will assist patient with transfers at night back to bed.  The therapist did LM with patient's daughter, Mary Jane also with symptoms she noted today.  CTC with Mary Jane on  [phone]  [facility] for [facility] to offer an appointment for re-evaluation of pain.  RIK Oden RN

## 2023-04-14 NOTE — TELEPHONE ENCOUNTER
"I spoke with daughter, Mary Jane, and was told that family is in the process of establishing care with an \"in-house\" provider at [facility].   They have completed the paperwork and are awaiting a visit with Radu Pierce.     He has not assumed care for patient yet, but family has been told that he may see patient today or Monday.  Daughter,Mary Jane, did speak with Physical therapist yesterday and she also has been concerned with the changes in patient's mobility.  She reports that two weeks ago patient fell while in the elevator at her facility.     Discussed with Mary Jane that the Physical therapist has reported increased pain and decreased mobility and I am concerned that she may have sustained an injury, or fracture.   I recommend that she be evaluated prior to the weekend.     If Radu is unable to see patient today, they can take patient to the ER for evaluation.  Discussed that if patient develops incontinence of bowel or bladder, numbness in lower extremities, she should be seen immediately in the ER.  Mary Jane states that family members have been discussing this, and are hoping Radu will be able to examine patient soon at the facility to determine the need for further evaluation as it is difficult to transport patient.    An ambulance is an option to consider.  Mary Jane will continue to discuss with her family members.    I also called Physical therapist, Erika from [facility], and  advising her that family will be transitioning care to Radu Pierce, and she should contact him for ongoing Physical therapy orders (that were requested below) after he examines patient.   After exam, he may determine a different need for Physical therapy that differ from orders noted below.    Advised Erika to contact me with any questions regarding this.  RIK Oden RN    "

## 2023-04-14 NOTE — TELEPHONE ENCOUNTER
In addition to below, noted that the PCP has been changed on 04/10 to RUSTAM Mayer CNP.  Has patient established care with a new provider?  RIK Oden RN

## 2023-04-14 NOTE — TELEPHONE ENCOUNTER
Son, Kurt, LM at 2:33 pm that his family decided that Dr. Radu Pierce will be taking over her medical care from now on. He is the in-house provider at [facility](assisted living [facility]). With any questions, son can be reached at [phone].     Called son back. He states that her ambulation & over all health has been slowly/continuiosly deteriorating since her last fall at the facility. Denies severe pain, incontinence of bowel/bladder, lower extremity pain/numbness, saddle pain or other acute concerns. He agrees to take her in to ER with any rapid worsening of sx's till she see  which is upcoming Monday(4/17). No other complains at this time.     MELIZA Weaver  Patient Advocate Liason (PAL)  [facility]  Ph. [phone] / Fax. [phone]

## 2023-04-17 NOTE — LETTER
4/17/2023        RE: Lynn Soriano  [address]  [place] [address]        [facility]  PRIMARY CARE PROVIDER AND CLINIC:  Radu Pierce, APRN CNP, [address]. / [place] [address]  Chief Complaint   Patient presents with     [facility] Medical Record Number:  [id number]  Place of Service where encounter took place:  [facility] [[id number]]    Lynn Soriano  is a 89 year old  (3/3/1929), admitted to the above facility from  [facility]. Hospital stay 3/6/23 through 4 hours..  Admitted to this facility for  rehab, medical management and nursing care.    HPI:    HPI information obtained from: facility chart records, facility staff, patient report, [facility] chart review and family/first contact dtr report.   Brief Summary of Hospital Course:     LE weakness. Has T10, L1 comp. Fx. S/p vertebroplasty. Reports low back pain gen. Stable. Increased LE weakness past few weeks. Episodes of knees giving out. Using w.c., BS commode. Cont. With PT. R LE weaker than L. Reports some R foot drag with amb at times. No saddle numbness or loss of bowel, bladder control. occ urinary incont. Uses w.c. at time in apt. And when out of apt. Due to gen. LE weakness. Has transport chair currently, will need lightweight w.c.    Falls: s/p fall 3/6/23 with ED visit. Fell while transferring off toilet, had R LE pain. RLE imaging neg for acute changes. Head CT with no acute changes, question of new R cerebellar infarct not previously seen on 5/22 head CT. Speech clear, UE strength equal. Not on anticoag. Subsequent fall 3/15/23 while in elevator-knees gave out. Fell to knees, has had some buttock pain. dtr questions need for pelvis XR to r/o fx. PT on hold currently. For pain taking tylenol.     MCI: OT completed cog testing. GCS score 3.5-mild cog. Decline. Mood, behaviors stable. Has had recurrent UTIs. Last tx 3/6/23  for UTI. 4/5/23 UA/UC neg.     Hand weakness. Has Raynaud's. Carpal tunnel, dx of pseudogout in hands  . Hands cold, some decrease in fine motor skills. occ pain. Started on hydroxychloroquine per Rheum. Visit -reports as not effective, would like dc'd.   Cont. On norvasc 5 mg every day for Raynaud's.     Hypokalemia. Last K+ stable cont. On kcl 10 meq qd        Updates on Status Since Skilled nursing Admission: mood stable. Sleeps ok, nocturia x 2 on average. Does call for assist with transfers.     CODE STATUS/ADVANCE DIRECTIVES DISCUSSION:   DNR / DNI  Patient's living condition: lives in an assisted living facility  ALLERGIES: Anticoagulant compound, Aspirin, and Penicillins  PAST MEDICAL HISTORY:  has a past medical history of Anemia, Closed wedge compression fracture of T10 vertebra (H) (2018), GERD (gastroesophageal reflux disease), Herpes zoster, History of vertebroplasty (03/10/2018), Raynaud's syndrome, Recurrent epistaxis, and Senile osteoporosis (2015).  PAST SURGICAL HISTORY:   has a past surgical history that includes Vertebroplasty; IR Lumbar Epidural Steroid Injection (8/4/2003); IR Lumbar Epidural Steroid Injection (1/28/2004); IR Lumbar Epidural Steroid Injection (9/14/2004); IR Lumbar Epidural Steroid Injection (9/14/2004); IR Lumbar Epidural Steroid Injection (9/6/2005); IR Lumbar Epidural Steroid Injection (9/6/2005); and IR Thoracic Vertebroplasty (3/20/2018).  FAMILY HISTORY: family history includes Nephrolithiasis in her sister; Osteoporosis in her sister.  SOCIAL HISTORY:   reports that she has never smoked. She has never used smokeless tobacco. She reports current alcohol use. She reports that she does not use drugs.    Post Discharge Medication Reconciliation Status: discharge medications reconciled and changed, per note/orders    Current Outpatient Medications   Medication Sig Dispense Refill     acetaminophen (TYLENOL) 500 MG tablet Take 1,000 mg by mouth every morning And every day prn   "     Calcium Carb-Cholecalciferol (CALCIUM PLUS VITAMIN D3) 600-12.5 MG-MCG CAPS Take 2 capsules by mouth daily       docusate sodium (COLACE) 100 MG capsule Take 100 mg by mouth daily And every day prn       amLODIPine (NORVASC) 5 MG tablet Take 1 tablet (5 mg) by mouth daily 90 tablet 3     Multiple Vitamin (TAB-A-JULIA) TABS TAKE 1 TABLET BY MOUTH ONCE DAILY 31 tablet 97     potassium chloride ER (K-TAB/KLOR-CON) 10 MEQ CR tablet TAKE 1 TABLET BY MOUTH ONCE DAILY 90 tablet 3         ROS:  No chest pain, shortness of breath, fevers, chills, headache, nausea, vomiting, dysuria or bowel abnormalities.  Appetite is  normal.  No pain except buttock pain, occ hands, low back. R shoulder due to rotator cuff tear.    Vitals:  /65   Pulse 77   Resp 16   Ht 1.6 m (5' 3\")   Wt 57.6 kg (127 lb)   SpO2 95%   BMI 22.50 kg/m    Exam:  GENERAL APPEARANCE:  Alert, in no distress, cooperative  ENT:  Mouth and posterior oropharynx normal, moist mucous membranes, [place], adequate dentition  EYES:  EOM, conjunctivae, lids, pupils and irises normal, PERRL, no drainage  NECK:  No adenopathy,masses or thyromegaly, no carotid bruit, FROM  RESP:  respiratory effort and palpation of chest normal, lungs clear to auscultation , no respiratory distress  CV:  Palpation and auscultation of heart done , regular rate and rhythm, no murmur, rub, or gallop, no edema  ABDOMEN:  normal bowel sounds, soft, nontender, no hepatosplenomegaly or other masses, no guarding or rebound, no bruits  M/S:   muscle strength 5/5 UEs, equal hand grasp strength . gen. LE weakness, R > L. L vaglus. stands indep. amb with CGA, using gait belt, walker.  no apparent pain with PROM LEs. some L buttock pain. no apparent SI joint pain with palp. decreased ROMN R shoulder.   SKIN:  Inspection of skin and subcutaneous tissue baseline  NEURO:   Cranial nerves 2-12 are normal tested and grossly at patient's baseline, speech clear, fluid, no tremor. gait steady with " walker, gait belt for short distance. some decreased sensation of feet.   PSYCH:  memory impaired , affect and mood normal, some STML. mood stable. no apparent anxiety    Lab/Diagnostic data:    Most Recent 3 CBC's:Recent Labs   Lab Test 03/06/23  1441 02/07/22  1550 10/24/19  1044   WBC 5.3 6.6 5.9   HGB 12.8 13.1 14.1   MCV 98 102* 97    209 186     Most Recent 3 BMP's:Recent Labs   Lab Test 03/06/23  1441 05/06/22  1457 02/07/22  1550    141 141   POTASSIUM 4.0 3.7 4.0   CHLORIDE 104 106 107   CO2 27 28 24   BUN 17.8 17 27   CR 0.62 0.52 0.69   ANIONGAP 10 7 10   MIKE 9.4 8.9 9.3   * 153* 96       ASSESSMENT/PLAN:  (R29.898) Weakness of both lower extremities  (primary encounter diagnosis)  Comment: chronic lumbar changes with h/o foraminal stenosis. Some R foot drag. No apparent pain with PROM LEs. Some R buttock pain. Some decreased sensation of feet. No saddle numbness.   Plan: 1. XR pelvis to R/o fx  2. Cont. Am tylenol. Add prn tylenol  3. Assist to amb. With gait belt, walker in apt  4. Resume PT  5. Order wheelchair    (R29.6) Recurrent falls  Comment: LE weakness as above, h/o UTIs.  Plan: 1. Cont. BSC  2. PT as above  3. Monitor for further changes in gait  4. Monitor for dysuria, increased urinary freq.    (R29.898) Weakness of both hands  Comment: Raynaud's, pseudogout, carpel tunnel  Plan: 1. Spoke with dtr, resident. Do not want EMG testing per Neuro at this time  2. Cont. OT, hand exercises  3. Cont. Norvasc, warming pad for Raynaud's  4. Discontinue hydroxychloroquine     (G31.84) MCI (mild cognitive impairment)  Comment: STML. Mood, behaviors stable  Plan: 1. Monitor for increased confusion  2. Staff to start med set-up, admin  3. Monitor for anxiety, insomnia    (E87.6) Hypokalemia  Comment: currently stable  Plan: 1. Cont. kcl  2. K+ in next 2-4 mos    (Z71.89) ACP (advance care planning)  Comment: DNR/DNI, POLST in chart  Plan: 1. Spoke with resident, dtr.          Electronically signed by:  RUSTAM Heck CNP       Face to Face and Medical Necessity Statement for DME Provider visit    Demographic Information on Lynn Soriano:  Gender: female  : 3/3/1929  [address] [place] [id number]  [phone] (home)     Medical Record: [id number]  Social Security Number: [id number]  Primary Care Provider: Radu Pierce  Insurance: Payor: MEDICA / Plan: MEDICA ADVANTAGE SOLUTIONS / Product Type: HMO /     HPI:   Lynn Soriano is a 89 year old  (3/3/1929), who is being seen today for a face to face provider visit at [facility]; medical necessity statement for DME included. This patient requires the following:  DME Ordered and Medical Necessity Statement     Wheelchair Documentation  Size: standard 16 x 16  Corresponding cushion: Yes: pressure alleviating  Standard foot rests: Yes  Elevating leg rests: No  Arm rests: No  Lap tray: No  Dose the patient use oxygen? No   Is the patient able to propel wheelchair? Yes If no why not?  And is there someone who can?yes staff  1. The patient has mobility limitations that impairs their ability to participate in one or more mobility related activities: Toileting, Feeding, Grooming and Bathing.  The wheelchair is suitable and necessary for use in the patient's home.  2. The patient's mobility limitations cannot be safely resolved by using a cane/walker:Yes    Reason why a cane or walker will not meet the patient's needs. (ie: balance, tolerance, level of assistance) falls, Leg weakness, legs give out at times  3. The patients home has adequate access to use a manual wheelchair:Yes  4. The use of a manual wheelchair on a regular basis will improve the patients ability to participate in mobility related ADL's at home:Yes  5. The patient is willing to use a manual wheelchair at home:Yes  6. The patient has adequate upper body strength and the mental capability to safely use a manual wheelchair  "and/or has a caregiver that is able to assist: Yes  7. Does the patient have a lower extremity injury or edema?No  Reason for Type of Wheelchair  Patient weight: 0 lbs 0 oz  Light Weight Wheelchair: Patient is unable to self-propel a standard wheelchair in the home but can self propel a light weight wheelchair.    Pt needing above DME with expected length of need of 99 mos due to medical necessity associated with following diagnosis:     Weakness of both lower extremities  Recurrent falls  Weakness of both hands  MCI (mild cognitive impairment)  Hypokalemia  ACP (advance care planning)      PMH   has a past medical history of Anemia, Closed wedge compression fracture of T10 vertebra (H) (2018), GERD (gastroesophageal reflux disease), Herpes zoster, History of vertebroplasty (03/10/2018), Raynaud's syndrome, Recurrent epistaxis, and Senile osteoporosis (2015).      EXAM  Vitals: /65   Pulse 77   Resp 16   Ht 1.6 m (5' 3\")   Wt 57.6 kg (127 lb)   SpO2 95%   BMI 22.50 kg/m  ;BMI= Body mass index is 22.5 kg/m .      ASSESSMENT/PLAN:  1. Weakness of both lower extremities    2. Recurrent falls    3. Weakness of both hands    4. MCI (mild cognitive impairment)    5. Hypokalemia    6. ACP (advance care planning)        Orders:  1. Facility staff/TC to contact DME company to get their order form for provider to fill out    ELECTRONICALLY SIGNED BY LEONARD CERTIFIED PROVIDER:  RUSTAM Heck CNP   NPI: [id number]  [facility]  [address] [place] [address], [address]  [place] [address]                            Sincerely,        RUSTAM Heck CNP      "

## 2023-04-17 NOTE — PROGRESS NOTES
[facility]  PRIMARY CARE PROVIDER AND CLINIC:  Radu Pierce, APRN CNP, [address] / [place] [address]  Chief Complaint   Patient presents with     [facility]     [facility] Record Number:  [id number]  Place of Service where encounter took place:  [facility]"[employer]  [facility] ([facility]) [[id number]]    Lynn Soriano  is a 89 year old  (3/3/1929), admitted to the above facility from  [facility]. Hospital stay 3/6/23 through 4 hours..  Admitted to this facility for  rehab, medical management and nursing care.    HPI:    HPI information obtained from: facility chart records, facility staff, patient report, [facility] chart review and family/first contact dtr report.   Brief Summary of Hospital Course:     LE weakness. Has T10, L1 comp. Fx. S/p vertebroplasty. Reports low back pain gen. Stable. Increased LE weakness past few weeks. Episodes of knees giving out. Using w.c., BS commode. Cont. With PT. R LE weaker than L. Reports some R foot drag with amb at times. No saddle numbness or loss of bowel, bladder control. occ urinary incont. Uses w.c. at time in apt. And when out of apt. Due to gen. LE weakness. Has transport chair currently, will need lightweight w.c.    Falls: s/p fall 3/6/23 with ED visit. Fell while transferring off toilet, had R LE pain. RLE imaging neg for acute changes. Head CT with no acute changes, question of new R cerebellar infarct not previously seen on 5/22 head CT. Speech clear, UE strength equal. Not on anticoag. Subsequent fall 3/15/23 while in elevator-knees gave out. Fell to knees, has had some buttock pain. dtr questions need for pelvis XR to r/o fx. PT on hold currently. For pain taking tylenol.     MCI: OT completed cog testing. GCS score 3.5-mild cog. Decline. Mood, behaviors stable. Has had recurrent UTIs. Last tx 3/6/23 for UTI. 4/5/23 UA/UC neg.     Hand weakness. Has Raynaud's. Carpal tunnel, dx of  pseudogout in hands  . Hands cold, some decrease in fine motor skills. occ pain. Started on hydroxychloroquine per Rheum. Visit -reports as not effective, would like dc'd.   Cont. On norvasc 5 mg every day for Raynaud's.     Hypokalemia. Last K+ stable cont. On kcl 10 meq qd        Updates on Status Since Skilled nursing Admission: mood stable. Sleeps ok, nocturia x 2 on average. Does call for assist with transfers.     CODE STATUS/ADVANCE DIRECTIVES DISCUSSION:   DNR / DNI  Patient's living condition: lives in an assisted living facility  ALLERGIES: Anticoagulant compound, Aspirin, and Penicillins  PAST MEDICAL HISTORY:  has a past medical history of Anemia, Closed wedge compression fracture of T10 vertebra (H) (2018), GERD (gastroesophageal reflux disease), Herpes zoster, History of vertebroplasty (03/10/2018), Raynaud's syndrome, Recurrent epistaxis, and Senile osteoporosis (2015).  PAST SURGICAL HISTORY:   has a past surgical history that includes Vertebroplasty; IR Lumbar Epidural Steroid Injection (8/4/2003); IR Lumbar Epidural Steroid Injection (1/28/2004); IR Lumbar Epidural Steroid Injection (9/14/2004); IR Lumbar Epidural Steroid Injection (9/14/2004); IR Lumbar Epidural Steroid Injection (9/6/2005); IR Lumbar Epidural Steroid Injection (9/6/2005); and IR Thoracic Vertebroplasty (3/20/2018).  FAMILY HISTORY: family history includes Nephrolithiasis in her sister; Osteoporosis in her sister.  SOCIAL HISTORY:   reports that she has never smoked. She has never used smokeless tobacco. She reports current alcohol use. She reports that she does not use drugs.    Post Discharge Medication Reconciliation Status: discharge medications reconciled and changed, per note/orders    Current Outpatient Medications   Medication Sig Dispense Refill     Calcium Carb-Cholecalciferol (CALCIUM PLUS VITAMIN D3) 600-12.5 MG-MCG CAPS Take 2 capsules by mouth daily       docusate sodium (COLACE) 100 MG capsule Take 100 mg by mouth  "daily And every day prn       ACETAMINOPHEN EXTRA STRENGTH 500 MG tablet TAKE 2 TABLETS (1000MG) BY MOUTH EVERY MORNING;& TAKE 2 TABLETS (1G) BY MOUTH ONCE DAILY AS NEEDED 360 tablet 97     amLODIPine (NORVASC) 5 MG tablet Take 1 tablet (5 mg) by mouth daily 90 tablet 3     Multiple Vitamin (TAB-A-JULIA) TABS TAKE 1 TABLET BY MOUTH ONCE DAILY 90 tablet 97     potassium chloride ER (K-TAB/KLOR-CON) 10 MEQ CR tablet TAKE 1 TABLET BY MOUTH ONCE DAILY 90 tablet 97     STOOL SOFTENER 100 MG tablet TAKE 1 TABLET BY MOUTH ONCE DAILY;& TAKE 1 TABLET BY MOUTH ONCE DAILY AS NEEDED 180 tablet 97         ROS:  No chest pain, shortness of breath, fevers, chills, headache, nausea, vomiting, dysuria or bowel abnormalities.  Appetite is  normal.  No pain except buttock pain, occ hands, low back. R shoulder due to rotator cuff tear.    Vitals:  /65   Pulse 77   Resp 16   Ht 1.473 m (4' 10\")   Wt 57.6 kg (127 lb)   SpO2 95%   BMI 26.54 kg/m    Exam:  GENERAL APPEARANCE:  Alert, in no distress, cooperative  ENT:  Mouth and posterior oropharynx normal, moist mucous membranes, [place], adequate dentition  EYES:  EOM, conjunctivae, lids, pupils and irises normal, PERRL, no drainage  NECK:  No adenopathy,masses or thyromegaly, no carotid bruit, FROM  RESP:  respiratory effort and palpation of chest normal, lungs clear to auscultation , no respiratory distress  CV:  Palpation and auscultation of heart done , regular rate and rhythm, no murmur, rub, or gallop, no edema  ABDOMEN:  normal bowel sounds, soft, nontender, no hepatosplenomegaly or other masses, no guarding or rebound, no bruits  M/S:   muscle strength 5/5 UEs, equal hand grasp strength . gen. LE weakness, R > L. L vaglus. stands indep. amb with CGA, using gait belt, walker.  no apparent pain with PROM LEs. some L buttock pain. no apparent SI joint pain with palp. decreased ROMN R shoulder.   SKIN:  Inspection of skin and subcutaneous tissue baseline  NEURO:   Cranial " nerves 2-12 are normal tested and grossly at patient's baseline, speech clear, fluid, no tremor. gait steady with walker, gait belt for short distance. some decreased sensation of feet.   PSYCH:  memory impaired , affect and mood normal, some STML. mood stable. no apparent anxiety    Lab/Diagnostic data:    Most Recent 3 CBC's:  Recent Labs   Lab Test 03/06/23  1441 02/07/22  1550 10/24/19  1044   WBC 5.3 6.6 5.9   HGB 12.8 13.1 14.1   MCV 98 102* 97    209 186     Most Recent 3 BMP's:  Recent Labs   Lab Test 03/06/23  1441 05/06/22  1457 02/07/22  1550    141 141   POTASSIUM 4.0 3.7 4.0   CHLORIDE 104 106 107   CO2 27 28 24   BUN 17.8 17 27   CR 0.62 0.52 0.69   ANIONGAP 10 7 10   MIKE 9.4 8.9 9.3   * 153* 96       ASSESSMENT/PLAN:  (R29.898) Weakness of both lower extremities  (primary encounter diagnosis)  Comment: chronic lumbar changes with h/o foraminal stenosis. Some R foot drag. No apparent pain with PROM LEs. Some R buttock pain. Some decreased sensation of feet. No saddle numbness.   Plan: 1. XR pelvis to R/o fx  2. Cont. Am tylenol. Add prn tylenol  3. Assist to amb. With gait belt, walker in apt  4. Resume PT  5. Order wheelchair    (R29.6) Recurrent falls  Comment: LE weakness as above, h/o UTIs.  Plan: 1. Cont. BSC  2. PT as above  3. Monitor for further changes in gait  4. Monitor for dysuria, increased urinary freq.    (R29.898) Weakness of both hands  Comment: Raynaud's, pseudogout, carpel tunnel  Plan: 1. Spoke with dtr, resident. Do not want EMG testing per Neuro at this time  2. Cont. OT, hand exercises  3. Cont. Norvasc, warming pad for Raynaud's  4. Discontinue hydroxychloroquine     (G31.84) MCI (mild cognitive impairment)  Comment: STML. Mood, behaviors stable  Plan: 1. Monitor for increased confusion  2. Staff to start med set-up, admin  3. Monitor for anxiety, insomnia    (E87.6) Hypokalemia  Comment: currently stable  Plan: 1. Cont. kcl  2. K+ in next 2-4 mos    (Z71.89)  ACP (advance care planning)  Comment: DNR/DNI, POLST in chart  Plan: 1. Spoke with resident, dtr.         Electronically signed by:  RUSTAM Heck CNP       Face to Face and Medical Necessity Statement for DME Provider visit    Demographic Information on Lynn Soriano:  Gender: female  : 3/3/1929  [address] [place] [id number]  [phone] (home)     Medical Record: [id number]  Social Security Number: [id number]  Primary Care Provider: Radu Pierce  Insurance: Payor: MEDICA / Plan: MEDICA ADVANT[employer] SOL[employer]IONS / Product Type: HMO /     HPI:   Lynn Soriano is a 89 year old  (3/3/1929), who is being seen today for a face to face provider visit at [facility]; medical necessity statement for DME included. This patient requires the following:  DME Ordered and Medical Necessity Statement     Wheelchair Documentation  Size: standard 16 x 16  Corresponding cushion: Yes: pressure alleviating  Standard foot rests: Yes  Elevating leg rests: No  Arm rests: No  Lap tray: No  Dose the patient use oxygen? No   Is the patient able to propel wheelchair? Yes If no why not?  And is there someone who can?yes staff  1. The patient has mobility limitations that impairs their ability to participate in one or more mobility related activities: Toileting, Feeding, Grooming and Bathing.  The wheelchair is suitable and necessary for use in the patient's home.  2. The patient's mobility limitations cannot be safely resolved by using a cane/walker:Yes    Reason why a cane or walker will not meet the patient's needs. (ie: balance, tolerance, level of assistance) falls, Leg weakness, legs give out at times  3. The patients home has adequate access to use a manual wheelchair:Yes  4. The use of a manual wheelchair on a regular basis will improve the patients ability to participate in mobility related ADL's at home:Yes  5. The patient is willing to use a manual wheelchair at home:Yes  6. The patient  "has adequate upper body strength and the mental capability to safely use a manual wheelchair and/or has a caregiver that is able to assist: Yes  7. Does the patient have a lower extremity injury or edema?No  Reason for Type of Wheelchair  Patient weight: 0 lbs 0 oz  Light Weight Wheelchair: Patient is unable to self-propel a standard wheelchair in the home but can self propel a light weight wheelchair.    Pt needing above DME with expected length of need of 99 mos due to medical necessity associated with following diagnosis:     Weakness of both lower extremities  Recurrent falls  Weakness of both hands  MCI (mild cognitive impairment)  Hypokalemia  ACP (advance care planning)      PMH   has a past medical history of Anemia, Closed wedge compression fracture of T10 vertebra (H) (2018), GERD (gastroesophageal reflux disease), Herpes zoster, History of vertebroplasty (03/10/2018), Raynaud's syndrome, Recurrent epistaxis, and Senile osteoporosis (2015).      EXAM  Vitals: /65   Pulse 77   Resp 16   Ht 1.473 m (4' 10\")   Wt 57.6 kg (127 lb)   SpO2 95%   BMI 26.54 kg/m  ;BMI= Body mass index is 26.54 kg/m .      ASSESSMENT/PLAN:  1. Weakness of both lower extremities    2. Recurrent falls    3. Weakness of both hands    4. MCI (mild cognitive impairment)    5. Hypokalemia    6. ACP (advance care planning)        Orders:  1. Facility staff/TC to contact DME company to get their order form for provider to fill out    ELECTRONICALLY SIGNED BY LEONARD CERTIFIED PROVIDER:  RUSTAM Heck CNP   NPI: [id number]  [facility]  [address] [place] [address], [address]  [place] [address]                      "

## 2023-04-21 NOTE — TELEPHONE ENCOUNTER
Received call from Carmen at pt's Assisted Living to clarify pt's vit d dosing  Advised that pt has a new primary Radu Ying RN on 4/21/2023 at 12:23 PM

## 2023-05-12 NOTE — TELEPHONE ENCOUNTER
Routing refill request to provider for review/approval because:  Medication is reported/historical, change of pharmacy  Ana Reyes RN

## 2023-05-15 NOTE — LETTER
"    5/15/2023        RE: Lynn Soriano  [address]  [place] [address]        Lynn Soriano is a 89 year old female seen May 15, 2023 at [facility] where she has resided for 4 years (admit 2019) recently turned over to [facility] and seen for initial visit.   Pt is seen in her apartment up to recliner, with son Kurt on the phone and he helps with history.    Has her hands in a heating bad secondary to Raynaud's: \"My hands are terrible, hard to use.\"     Kurt notes pt has had some cognitive decline, recently called her daughter very confused recently.   Pt does acknowledge some memory loss, states her sister Alicia \"remembers more things than I do.\"   Family helps with appointments and she now has med administration by AL staff.    Daughters have been concerned about depression and pt agrees, related to decline in independence and function.       By chart review, pt has been followed by [facility] Rheumatology for Raynaud's, CTS and chondrocalcinosis.   Last seen in October 2022 and started on colchicine for the latter after x-ray showed CPPD crystals   Pt reports pain and stiffness in her hands but has not had ischemia from the Raynaud's   Tx'd with amlodipine.    Pt was seen in the [facility] ED in March 2023 after she suffered several falls.  Legs \"wiggling and weak\"   X-rays negative for fracture, but head CT showed chronic cerebellar infarct as below.   Has transitioned to  more since then, had a course of [facility] Therapy.        Past Medical History:   Diagnosis Date     Anemia      Closed wedge compression fracture of T10 vertebra (H) 2018    vertebroplasty     GERD (gastroesophageal reflux disease)      Herpes zoster      History of vertebroplasty 03/10/2018    T10     Raynaud's syndrome      Recurrent epistaxis      Senile osteoporosis 2015     Past Surgical History:   Procedure Laterality Date     IR LUMBAR EPIDURAL STEROID INJECTION  8/4/2003     IR LUMBAR EPIDURAL STEROID INJECTION  1/28/2004 " "    IR LUMBAR EPIDURAL STEROID INJECTION  9/14/2004     IR LUMBAR EPIDURAL STEROID INJECTION  9/14/2004     IR LUMBAR EPIDURAL STEROID INJECTION  9/6/2005     IR LUMBAR EPIDURAL STEROID INJECTION  9/6/2005     IR THORACIC VERTEBROPLASTY  3/20/2018     VERTEBROPLASTY       SH:  , lives alone in AL   She has 8 living children.   Originally had 5 sons and 4 daughters.    Her sister Alicia also lives at [facility]   Non smoker     ROS:  CPT 4.8 >>>now 4.2-4.6      BCRS 3.5 >>>now 4 on recent testing    Uses 4WW in her apartment, WC for distance  H/o constipation  Wt Readings from Last 5 Encounters:   05/15/23 50.3 kg (111 lb)   04/17/23 57.6 kg (127 lb)   03/13/23 59 kg (130 lb)   10/27/22 57.7 kg (127 lb 1.6 oz)   09/29/22 57.7 kg (127 lb 1.6 oz)      EXAM: NAD  BP (!) 166/66   Pulse 93   Temp 97.7  F (36.5  C)   Resp 18   Ht 1.6 m (5' 3\")   Wt 50.3 kg (111 lb)   SpO2 93%   BMI 19.66 kg/m     Neck supple without adenopathy  +kyphosis  Lungs with decreased BS, fine rales in the bases  Heart RRR s1s2  Abd soft, NT, no distention or guarding, +BS  Ext with 1+ ankle edema, wearing compression stockings.   Neuro: +STML  Psych: affect okay, pleasant     Lab Results   Component Value Date     03/06/2023    POTASSIUM 4.0 03/06/2023    CHLORIDE 104 03/06/2023    CO2 27 03/06/2023    ANIONGAP 10 03/06/2023     (H) 03/06/2023    BUN 17.8 03/06/2023    CR 0.62 03/06/2023    GFRESTIMATED 82 03/06/2023    MIKE 9.4 03/06/2023      Lab Results   Component Value Date    WBC 5.3 03/06/2023      HGB 12.8 03/06/2023      MCV 98 03/06/2023       03/06/2023      CT SCAN OF THE HEAD WITHOUT CONTRAST   3/6/2023   HISTORY: Fall, head trauma.  FINDINGS:  No evidence of intracranial hemorrhage. Volume loss and  confluent white matter hypoattenuation which likely represents  advanced chronic small vessel ischemic change. Small chronic-appearing  right cerebellar infarct, new compared to the prior. Small " dural-based  calcification along the left frontal lobe, presumably benign,  unchanged. No acute osseous abnormality. Mild paranasal sinus mucosal  thickening with probable left maxillary sinus retention cyst. Probable  tiny right frontal scalp contusion without underlying skull fracture.                                                               IMP/PLAN:   (I73.00) Raynaud's disease without gangrene    Comment: with pain, but no h/o ischemia     Plan: heat to hands prn   Amlodipine 5 mg/day      (M11.20) Chondrocalcinosis  Comment: CPPD crystals on x-ray   Pain and stiffness  Plan: Rheumatology recommended colchicine 0.6 mg/day but this has not been started.   Would consider using this if ongoing pain.        (R29.898) Weakness of both lower extremities  (R29.6) Recurrent falls  Comment: decline in mobility     Plan: has had a course of Physical Therapy /Occupational Therapy   Manual WC has been ordered       (G31.84) MCI (mild cognitive impairment)  Comment: decline in cognitive testing as above   Plan: AL and family support for med admin, meals, activity, appointments    (M81.0) Age-related osteoporosis without current pathological fracture  Comment: h/o T10 and L1 compression fractures   Plan: would consider adding vit D       (I10) Benign essential hypertension  Comment: new dx   Plan: amlodipine 5 mg/day      (I67.9) Cerebral vascular disease  Comment: cerebellar infarct     Plan: consider addition of daily ASA for secondary prevention     (F43.21) Adjustment disorder with depressed mood  Comment: as above   Plan: Start duloxetine 30 mg/day - for pain and depression     Shivani Cagle MD         Sincerely,        Shivani Cagle MD

## 2023-05-15 NOTE — PROGRESS NOTES
"Lynn Soriano is a 89 year old female seen May 15, 2023 at [facility] where she has resided for 4 years (admit 2019) recently turned over to [facility] and seen for initial visit.   Pt is seen in her apartment up to recliner, with son Kurt on the phone and he helps with history.    Has her hands in a heating bad secondary to Raynaud's: \"My hands are terrible, hard to use.\"     Kurt notes pt has had some cognitive decline, recently called her daughter very confused recently.   Pt does acknowledge some memory loss, states her sister Alicia \"remembers more things than I do.\"   Family helps with appointments and she now has med administration by AL staff.    Daughters have been concerned about depression and pt agrees, related to decline in independence and function.  And her best friend passed yesterday  Pt tells family she wants to \"leave\"       By chart review, pt has been followed by [facility]umatology for Raynaud's, CTS and chondrocalcinosis.   Last seen in October 2022 and started on colchicine for the latter after x-ray showed CPPD crystals   Pt reports pain and stiffness in her hands but has not had ischemia from the Raynaud's   Tx'd with amlodipine.    Pt was seen in the [facility] ED in March 2023 after she suffered several falls.  Legs \"wiggling and weak\"   X-rays negative for fracture, but head CT showed chronic cerebellar infarct as below.   Has transitioned to  more since then, had a course of [facility] Physical Therapy.        Past Medical History:   Diagnosis Date     Anemia      Closed wedge compression fracture of T10 vertebra (H) 2018    vertebroplasty     GERD (gastroesophageal reflux disease)      Herpes zoster      History of vertebroplasty 03/10/2018    T10     Raynaud's syndrome      Recurrent epistaxis      Senile osteoporosis 2015     Past Surgical History:   Procedure Laterality Date     IR LUMBAR EPIDURAL STEROID INJECTION  8/4/2003     IR LUMBAR EPIDURAL STEROID INJECTION  1/28/2004     IR LUMBAR " "EPIDURAL STEROID INJECTION  9/14/2004     IR LUMBAR EPIDURAL STEROID INJECTION  9/14/2004     IR LUMBAR EPIDURAL STEROID INJECTION  9/6/2005     IR LUMBAR EPIDURAL STEROID INJECTION  9/6/2005     IR THORACIC VERTEBROPLASTY  3/20/2018     VERTEBROPLASTY       SH:  , lives alone in AL   She has 8 living children.   Originally had 5 sons and 4 daughters.    Her sister Alicia also lives at [facility]   Non smoker     ROS:  CPT 4.8 >>>now 4.2-4.6      BCRS 3.5 >>>now 4 on recent testing    Uses 4WW in her apartment, WC for distance  H/o constipation  Wt Readings from Last 5 Encounters:   05/15/23 50.3 kg (111 lb)   04/17/23 57.6 kg (127 lb)   03/13/23 59 kg (130 lb)   10/27/22 57.7 kg (127 lb 1.6 oz)   09/29/22 57.7 kg (127 lb 1.6 oz)      EXAM: NAD  BP (!) 166/66   Pulse 93   Temp 97.7  F (36.5  C)   Resp 18   Ht 1.6 m (5' 3\")   Wt 50.3 kg (111 lb)   SpO2 93%   BMI 19.66 kg/m     Neck supple without adenopathy  +kyphosis  Lungs with decreased BS, fine rales in the bases  Heart RRR s1s2  Abd soft, NT, no distention or guarding, +BS  Ext with 1+ ankle edema, wearing compression stockings.   Neuro: +STML  Psych: affect okay, pleasant     Lab Results   Component Value Date     03/06/2023    POTASSIUM 4.0 03/06/2023    CHLORIDE 104 03/06/2023    CO2 27 03/06/2023    ANIONGAP 10 03/06/2023     (H) 03/06/2023    BUN 17.8 03/06/2023    CR 0.62 03/06/2023    GFRESTIMATED 82 03/06/2023    MIKE 9.4 03/06/2023      Lab Results   Component Value Date    WBC 5.3 03/06/2023      HGB 12.8 03/06/2023      MCV 98 03/06/2023       03/06/2023      CT SCAN OF THE HEAD WITHOUT CONTRAST   3/6/2023   HISTORY: Fall, head trauma.  FINDINGS:  No evidence of intracranial hemorrhage. Volume loss and  confluent white matter hypoattenuation which likely represents  advanced chronic small vessel ischemic change. Small chronic-appearing  right cerebellar infarct, new compared to the prior. Small " dural-based  calcification along the left frontal lobe, presumably benign,  unchanged. No acute osseous abnormality. Mild paranasal sinus mucosal  thickening with probable left maxillary sinus retention cyst. Probable  tiny right frontal scalp contusion without underlying skull fracture.                                                               IMP/PLAN:   (I73.00) Raynaud's disease without gangrene    Comment: with pain, but no h/o ischemia     Plan: heat to hands prn   Amlodipine 5 mg/day      (M11.20) Chondrocalcinosis  Comment: CPPD crystals on x-ray   Pain and stiffness  Plan: Rheumatology recommended colchicine 0.6 mg/day but this has not been started.   Would consider using this if ongoing pain.        (R29.898) Weakness of both lower extremities  (R29.6) Recurrent falls  Comment: decline in mobility     Plan: has had a course of Physical Therapy /Occupational Therapy   Manual WC has been ordered       (G31.84) MCI (mild cognitive impairment)  Comment: decline in cognitive testing as above   Plan: AL and family support for med admin, meals, activity, appointments    (M81.0) Age-related osteoporosis without current pathological fracture  Comment: h/o T10 and L1 compression fractures   Plan: would consider adding vit D       (I10) Benign essential hypertension  Comment: new dx   Plan: amlodipine 5 mg/day      (I67.9) Cerebral vascular disease  Comment: cerebellar infarct     Plan: consider addition of daily ASA for secondary prevention     (F43.21) Adjustment disorder with depressed mood  Comment: as above, many recent stressors   Plan: Start duloxetine 30 mg/day - for pain and depression     Shivani Cagle MD

## 2023-05-22 NOTE — PROGRESS NOTES
[facility]  [place] Medical Record Number:  [id number]  Place of Service where encounter took place:  [facility]  [facility] ([facility]) [[id number]]  Chief Complaint   Patient presents with     Fatigue     Depression       HPI:    Lynn Soriano  is a 89 year old (3/3/1929), who is being seen today for an episodic care visit.  HPI information obtained from: facility chart records, facility staff, patient report, [facility] chart review and family/first contact dtr report. Today's concern is: weakness, depression, constipation. Has ongoing LE weakness. Completed PT. Per dtr, PT did not recommend use of EZ stand. On occasion has increased difficulty with transfers. Is able to amb. With gait belt, assist for short distances.  Per family, noted to have some depression. Resident reports some depression, feelings of sadness over loss of independence. Some decreased interest in activities.  occ difficulty falling asleep. Has chronic pain. Low back pain, Raynaud's. Ongoing hand discomfort. Per dtr did have Rheumatology appt 3/23 and was dx with lupus.  Visit notes no available in chart or EMAR.  Will need to be sent. Has MCI. Taking colace for constipation-can not remember how often having bowel movements.       Past Medical and Surgical History reviewed in Epic today.    MEDICATIONS:    Current Outpatient Medications   Medication Sig Dispense Refill     docusate sodium (COLACE) 100 MG capsule Take 200 mg by mouth daily       docusate sodium (COLACE) 100 MG capsule Take 100 mg by mouth daily as needed for constipation       DULoxetine (CYMBALTA) 30 MG capsule Take 30 mg by mouth daily       ACETAMINOPHEN EXTRA STRENGTH 500 MG tablet TAKE 2 TABLETS (1000MG) BY MOUTH EVERY MORNING;& TAKE 2 TABLETS (1G) BY MOUTH ONCE DAILY AS NEEDED 360 tablet 97     amLODIPine (NORVASC) 5 MG tablet Take 1 tablet (5 mg) by mouth daily 90 tablet 3     Calcium Carb-Cholecalciferol (CALCIUM PLUS VITAMIN D3) 600-12.5  MG-MCG CAPS Take 2 capsules by mouth daily       Multiple Vitamin (TAB-A-JULIA) TABS TAKE 1 TABLET BY MOUTH ONCE DAILY 90 tablet 97     potassium chloride ER (K-TAB/KLOR-CON) 10 MEQ CR tablet TAKE 1 TABLET BY MOUTH ONCE DAILY 90 tablet 97         REVIEW OF SYSTEMS:  CONSTITUTIONAL:  body aches, forgetfulness and sleep disturbance, EYES:  neg, ENT:  neg, CV:  neg, RESPIRATORY: neg, :  neg, GI:  neg, NEURO:  neg, PSYCH: depression and MUSCULOSKELETAL: back pain, muscular weakness and hand pain    Objective:  /73   Pulse 80   Resp 18   Exam:  GENERAL APPEARANCE:  Alert, in no distress, cooperative  ENT:  Mouth and posterior oropharynx normal, moist mucous membranes, normal hearing acuity  EYES:  EOM, conjunctivae, lids, pupils and irises normal, PERRL  RESP:  respiratory effort and palpation of chest normal, lungs clear to auscultation , no respiratory distress  CV:  Palpation and auscultation of heart done , regular rate and rhythm, no murmur, rub, or gallop, no edema  ABDOMEN:  normal bowel sounds, soft, nontender, no hepatosplenomegaly or other masses, no guarding or rebound  M/S:   muscle strength 5/5 UEs, gen. LE weakness, normal muscle tone  NEURO:   Cranial nerves 2-12 are normal tested and grossly at patient's baseline, no tremor  PSYCH:  memory impaired , affect and mood normal, no apparent anxiety    Labs:     Most Recent 3 CBC's:Recent Labs   Lab Test 03/06/23  1441 02/07/22  1550 10/24/19  1044   WBC 5.3 6.6 5.9   HGB 12.8 13.1 14.1   MCV 98 102* 97    209 186     Most Recent 3 BMP's:Recent Labs   Lab Test 03/06/23  1441 05/06/22  1457 02/07/22  1550    141 141   POTASSIUM 4.0 3.7 4.0   CHLORIDE 104 106 107   CO2 27 28 24   BUN 17.8 17 27   CR 0.62 0.52 0.69   ANIONGAP 10 7 10   MIKE 9.4 8.9 9.3   * 153* 96       ASSESSMENT/PLAN:  (R22.291) Weakness of both lower extremities  (primary encounter diagnosis)  Comment: occ increased difficulty with transfers. EZ stand not recommended  by PT  Plan: 1. Await new w.c.  2. Monitor for reports of further increased need of assist with transfers  3. For above s/s may refer to PT to eval EZ stand  4. Encourage amb. As karen with walker, gait belt    (F32.A) Depression, unspecified depression type  Comment: reports depression regarding loss of independence. Some sleep disturbance at times, some loss of interest in activities.   Plan: 1. Spoke with dtr. Will start cymbalta  2. Cont. Tylenol for chronic pain  3. Reassess mood over next 2-4 weeks for effectiveness    (K59.00) Constipation, unspecified constipation type  Comment: h/o s/s. Unclear if having current increase in s/s due to MCI-unable ot remember freq. Of BMs  Plan: 1. Cont. Sched., prn colace  2. Encourage fluids  3. Staff to start bowel charting.    Electronically signed by:  RUSTAM Heck CNP

## 2023-05-22 NOTE — LETTER
5/22/2023        RE: Lynn Soriano  [address]  [place] [address]        [facility]  [place] Medical Record Number:  [id number]  Place of Service where encounter took place:  [facility] [place] [facility]  [facility] ([facility]) [[id number]]  Chief Complaint   Patient presents with     Fatigue     Depression       HPI:    Lynn Soriano  is a 89 year old (3/3/1929), who is being seen today for an episodic care visit.  HPI information obtained from: facility chart records, facility staff, patient report, [facility] chart review and family/first contact dtr report. Today's concern is: weakness, depression, constipation. Has ongoing LE weakness. Completed PT. Per dtr, PT did not recommend use of EZ stand. On occasion has increased difficulty with transfers. Is able to amb. With gait belt, assist for short distances.  Per family, noted to have some depression. Resident reports some depression, feelings of sadness over loss of independence. Some decreased interest in activities.  occ difficulty falling asleep. Has chronic pain. Low back pain, Raynaud's. Ongoing hand discomfort. Per dtr did have Rheumatology appt 3/23 and was dx with lupus.  Visit notes no available in chart or EMAR.  Will need to be sent. Has MCI. Taking colace for constipation-can not remember how often having bowel movements.       Past Medical and Surgical History reviewed in Epic today.    MEDICATIONS:    Current Outpatient Medications   Medication Sig Dispense Refill     docusate sodium (COLACE) 100 MG capsule Take 200 mg by mouth daily       docusate sodium (COLACE) 100 MG capsule Take 100 mg by mouth daily as needed for constipation       DULoxetine (CYMBALTA) 30 MG capsule Take 30 mg by mouth daily       ACETAMINOPHEN EXTRA STRENGTH 500 MG tablet TAKE 2 TABLETS (1000MG) BY MOUTH EVERY MORNING;& TAKE 2 TABLETS (1G) BY MOUTH ONCE DAILY AS NEEDED 360 tablet 97     amLODIPine (NORVASC) 5 MG tablet Take 1 tablet (5 mg) by  mouth daily 90 tablet 3     Calcium Carb-Cholecalciferol (CALCIUM PLUS VITAMIN D3) 600-12.5 MG-MCG CAPS Take 2 capsules by mouth daily       Multiple Vitamin (TAB-A-JULIA) TABS TAKE 1 TABLET BY MOUTH ONCE DAILY 90 tablet 97     potassium chloride ER (K-TAB/KLOR-CON) 10 MEQ CR tablet TAKE 1 TABLET BY MOUTH ONCE DAILY 90 tablet 97         REVIEW OF SYSTEMS:  CONSTITUTIONAL:  body aches, forgetfulness and sleep disturbance, EYES:  neg, ENT:  neg, CV:  neg, RESPIRATORY: neg, :  neg, GI:  neg, NEURO:  neg, PSYCH: depression and MUSCULOSKELETAL: back pain, muscular weakness and hand pain    Objective:  /73   Pulse 80   Resp 18   Exam:  GENERAL APPEARANCE:  Alert, in no distress, cooperative  ENT:  Mouth and posterior oropharynx normal, moist mucous membranes, normal hearing acuity  EYES:  EOM, conjunctivae, lids, pupils and irises normal, PERRL  RESP:  respiratory effort and palpation of chest normal, lungs clear to auscultation , no respiratory distress  CV:  Palpation and auscultation of heart done , regular rate and rhythm, no murmur, rub, or gallop, no edema  ABDOMEN:  normal bowel sounds, soft, nontender, no hepatosplenomegaly or other masses, no guarding or rebound  M/S:   muscle strength 5/5 UEs, gen. LE weakness, normal muscle tone  NEURO:   Cranial nerves 2-12 are normal tested and grossly at patient's baseline, no tremor  PSYCH:  memory impaired , affect and mood normal, no apparent anxiety    Labs:     Most Recent 3 CBC's:Recent Labs   Lab Test 03/06/23  1441 02/07/22  1550 10/24/19  1044   WBC 5.3 6.6 5.9   HGB 12.8 13.1 14.1   MCV 98 102* 97    209 186     Most Recent 3 BMP's:Recent Labs   Lab Test 03/06/23  1441 05/06/22  1457 02/07/22  1550    141 141   POTASSIUM 4.0 3.7 4.0   CHLORIDE 104 106 107   CO2 27 28 24   BUN 17.8 17 27   CR 0.62 0.52 0.69   ANIONGAP 10 7 10   MIKE 9.4 8.9 9.3   * 153* 96       ASSESSMENT/PLAN:  (R29.415) Weakness of both lower extremities  (primary  encounter diagnosis)  Comment: occ increased difficulty with transfers. EZ stand not recommended by PT  Plan: 1. Await new w.c.  2. Monitor for reports of further increased need of assist with transfers  3. For above s/s may refer to PT to eval EZ stand  4. Encourage amb. As karen with walker, gait belt    (F32.A) Depression, unspecified depression type  Comment: reports depression regarding loss of independence. Some sleep disturbance at times, some loss of interest in activities.   Plan: 1. Spoke with dtr. Will start cymbalta  2. Cont. Tylenol for chronic pain  3. Reassess mood over next 2-4 weeks for effectiveness    (K59.00) Constipation, unspecified constipation type  Comment: h/o s/s. Unclear if having current increase in s/s due to MCI-unable ot remember freq. Of BMs  Plan: 1. Cont. Sched., prn colace  2. Encourage fluids  3. Staff to start bowel charting.    Electronically signed by:  RUSTAM Heck CNP               Sincerely,        RUSTAM Heck CNP

## 2023-06-12 NOTE — LETTER
6/12/2023        RE: Lynn Soriano  [address]  [place] [address]        [facility]  [place] [facility] Record Number:  [id number]  Place of Service where encounter took place:  [facility]  [facility] ([facility]) [[id number]]  Chief Complaint   Patient presents with     Fatigue       HPI:    Lynn Soriano  is a 89 year old (3/3/1929), who is being seen today for an episodic care visit.  HPI information obtained from: facility chart records, facility staff, patient report, [facility] chart review and family/first contact dtr report. Today's concern is: weakness, HTN, depression. Has LE weakness. Per dtr, noted to have some increase in r sided weakness, weak R hand grasp strength. Noted to have increase in nocturia, urinary freq.  Does have h/o UTI. At times has sig. Difficulty with transfers, requiring extensive staff assist. Only amb. Short distance in room with gait belt, walker. For HTN taking norvasc. BP stable no reports of dizziness. Started on cymbalta last month for depression. Per dtr has seemed happier, making less negative statements.       Past Medical and Surgical History reviewed in Epic today.    MEDICATIONS:    Current Outpatient Medications   Medication Sig Dispense Refill     ACETAMINOPHEN EXTRA STRENGTH 500 MG tablet TAKE 2 TABLETS (1000MG) BY MOUTH EVERY MORNING;& TAKE 2 TABLETS (1G) BY MOUTH ONCE DAILY AS NEEDED 360 tablet 97     amLODIPine (NORVASC) 5 MG tablet Take 1 tablet (5 mg) by mouth daily 90 tablet 3     Calcium Carb-Cholecalciferol (CALCIUM PLUS VITAMIN D3) 600-12.5 MG-MCG CAPS Take 2 capsules by mouth daily 180 capsule 3     docusate sodium (COLACE) 100 MG capsule Take 200 mg by mouth daily       docusate sodium (COLACE) 100 MG capsule Take 100 mg by mouth daily as needed for constipation       DULoxetine (CYMBALTA) 30 MG capsule Take 1 capsule (30 mg) by mouth daily 90 capsule 3     Multiple Vitamin (TAB-A-JULIA) TABS TAKE 1 TABLET BY MOUTH  ONCE DAILY 90 tablet 97     potassium chloride ER (K-TAB/KLOR-CON) 10 MEQ CR tablet TAKE 1 TABLET BY MOUTH ONCE DAILY 90 tablet 97         REVIEW OF SYSTEMS:  No chest pain, shortness of breath, fevers, chills, headache, nausea, vomiting, dysuria or bowel abnormalities.  Appetite is  normal.  No pain except occ hands, LEs.    Objective:  /66   Pulse 70   Resp 18   SpO2 94%   Exam:  GENERAL APPEARANCE:  Alert, in no distress, cooperative  ENT:  Mouth and posterior oropharynx normal, moist mucous membranes, [place]  EYES:  EOM, conjunctivae, lids, pupils and irises normal, PERRL  NECK:  FROM  RESP:  respiratory effort and palpation of chest normal, lungs clear to auscultation , no respiratory distress  CV:  Palpation and auscultation of heart done , regular rate and rhythm, no murmur, rub, or gallop, no edema  ABDOMEN:  normal bowel sounds, soft, nontender, no hepatosplenomegaly or other masses, no guarding or rebound  M/S:   muscle strength 5/5 all 4 ext. some gen. LE weakness. assist to stand. L LEvalgus. R foot drop with gait.  NEURO:   Cranial nerves 2-12 are normal tested and grossly at patient's baseline, speech fluid, no tremor  PSYCH:  insight and judgement impaired, memory impaired , affect and mood normal    Labs:     Most Recent 3 CBC's:Recent Labs   Lab Test 03/06/23  1441 02/07/22  1550 10/24/19  1044   WBC 5.3 6.6 5.9   HGB 12.8 13.1 14.1   MCV 98 102* 97    209 186     Most Recent 3 BMP's:Recent Labs   Lab Test 03/06/23  1441 05/06/22  1457 02/07/22  1550    141 141   POTASSIUM 4.0 3.7 4.0   CHLORIDE 104 106 107   CO2 27 28 24   BUN 17.8 17 27   CR 0.62 0.52 0.69   ANIONGAP 10 7 10   MIKE 9.4 8.9 9.3   * 153* 96       ASSESSMENT/PLAN:  (E94.074) Weakness of both lower extremities  (primary encounter diagnosis)  Comment: ongoing. occ sig. Increase in weakness with difficulty transferring. Family feel EZ stand may be needed on prn basis. Did get new w.c.  Plan: 1. Refer to PT,  OT  2. Cont. Current pain meds  3. Encourage amb. With gait belt, walker in apt. As able  4. Check UA/UC to r/o UTI    (I10) Benign essential hypertension  Comment: BP stable  Plan: 1. Cont. norvasc  2. Follow BPs, HRs  3. Encourage fluids  4. Monitor for dizziness    (F32.A) Depression, unspecified depression type  Comment: per dtr, appears happier. No current reports of GI distress  Plan: 1. Cont. Cymbalta  2. Reassess mood over next few weeks  3. Monitor for negative statements of wanting to die    Electronically signed by:  RUSTAM Heck CNP         Documentation of Face-to-Face and Certification for Home Health Services     Patient: Lynn Soriano   YOB: 1929  MR Number: [id number]  Today's Date: 6/12/2023    I certify that patient: Lynn Soriano is under my care and that I, or a nurse practitioner or physician's assistant working with me, had a face-to-face encounter that meets the physician face-to-face encounter requirements with this patient on: 6/12/2023.    This encounter with the patient was in whole, or in part, for the following medical condition, which is the primary reason for home health care: difficulty with transfers.    I certify that, based on my findings, the following services are medically necessary home health services: Occupational Therapy and Physical Therapy.    My clinical findings support the need for the above services because: Occupational Therapy Services are needed to assess and treat cognitive ability and address ADL safety due to impairment in mobility. and Physical Therapy Services are needed to assess and treat the following functional impairments: LE weakness, difficulty with transfers.    Further, I certify that my clinical findings support that this patient is homebound (i.e. absences from home require considerable and taxing effort and are for medical reasons or [facility] services or infrequently or of short duration when for other reasons)  because: Requires assistance of another person or specialized equipment to access medical services because patient:  requires assist with transfers..    Based on the above findings. I certify that this patient is confined to the home and needs intermittent skilled nursing care, physical therapy and/or speech therapy.  The patient is under my care, and I have initiated the establishment of the plan of care.  This patient will be followed by a physician who will periodically review the plan of care.  Physician/Provider to provide follow up care: Radu Pierce    Responsible Medicare certified PEC Physician: Shivani Cagle  Physician Signature: See electronic signature associated with these discharge orders.  Date: 6/12/2023              Sincerely,        Radu Pierce, RUSTAM CNP

## 2023-06-12 NOTE — PROGRESS NOTES
[facility]  [place] Medical Record Number:  [id number]  Place of Service where encounter took place:  [facility]  [facility] ([facility]) [[id number]]  Chief Complaint   Patient presents with     Fatigue       HPI:    Lynn Soriano  is a 89 year old (3/3/1929), who is being seen today for an episodic care visit.  HPI information obtained from: facility chart records, facility staff, patient report, [facility] chart review and family/first contact dtr report. Today's concern is: weakness, HTN, depression. Has LE weakness. Per dtr, noted to have some increase in r sided weakness, weak R hand grasp strength. Noted to have increase in nocturia, urinary freq.  Does have h/o UTI. At times has sig. Difficulty with transfers, requiring extensive staff assist. Only amb. Short distance in room with gait belt, walker. For HTN taking norvasc. BP stable no reports of dizziness. Started on cymbalta last month for depression. Per dtr has seemed happier, making less negative statements.       Past Medical and Surgical History reviewed in Epic today.    MEDICATIONS:    Current Outpatient Medications   Medication Sig Dispense Refill     ACETAMINOPHEN EXTRA STRENGTH 500 MG tablet TAKE 2 TABLETS (1000MG) BY MOUTH EVERY MORNING;& TAKE 2 TABLETS (1G) BY MOUTH ONCE DAILY AS NEEDED 360 tablet 97     amLODIPine (NORVASC) 5 MG tablet Take 1 tablet (5 mg) by mouth daily 90 tablet 3     Calcium Carb-Cholecalciferol (CALCIUM PLUS VITAMIN D3) 600-12.5 MG-MCG CAPS Take 2 capsules by mouth daily 180 capsule 3     docusate sodium (COLACE) 100 MG capsule Take 200 mg by mouth daily       docusate sodium (COLACE) 100 MG capsule Take 100 mg by mouth daily as needed for constipation       DULoxetine (CYMBALTA) 30 MG capsule Take 1 capsule (30 mg) by mouth daily 90 capsule 3     Multiple Vitamin (TAB-A-JULIA) TABS TAKE 1 TABLET BY MOUTH ONCE DAILY 90 tablet 97     potassium chloride ER (K-TAB/KLOR-CON) 10 MEQ CR tablet TAKE 1  TABLET BY MOUTH ONCE DAILY 90 tablet 97         REVIEW OF SYSTEMS:  No chest pain, shortness of breath, fevers, chills, headache, nausea, vomiting, dysuria or bowel abnormalities.  Appetite is  normal.  No pain except occ hands, LEs.    Objective:  /66   Pulse 70   Resp 18   SpO2 94%   Exam:  GENERAL APPEARANCE:  Alert, in no distress, cooperative  ENT:  Mouth and posterior oropharynx normal, moist mucous membranes, [place]  EYES:  EOM, conjunctivae, lids, pupils and irises normal, PERRL  NECK:  FROM  RESP:  respiratory effort and palpation of chest normal, lungs clear to auscultation , no respiratory distress  CV:  Palpation and auscultation of heart done , regular rate and rhythm, no murmur, rub, or gallop, no edema  ABDOMEN:  normal bowel sounds, soft, nontender, no hepatosplenomegaly or other masses, no guarding or rebound  M/S:   muscle strength 5/5 all 4 ext. some gen. LE weakness. assist to stand. L LEvalgus. R foot drop with gait.  NEURO:   Cranial nerves 2-12 are normal tested and grossly at patient's baseline, speech fluid, no tremor  PSYCH:  insight and judgement impaired, memory impaired , affect and mood normal    Labs:     Most Recent 3 CBC's:Recent Labs   Lab Test 03/06/23  1441 02/07/22  1550 10/24/19  1044   WBC 5.3 6.6 5.9   HGB 12.8 13.1 14.1   MCV 98 102* 97    209 186     Most Recent 3 BMP's:Recent Labs   Lab Test 03/06/23  1441 05/06/22  1457 02/07/22  1550    141 141   POTASSIUM 4.0 3.7 4.0   CHLORIDE 104 106 107   CO2 27 28 24   BUN 17.8 17 27   CR 0.62 0.52 0.69   ANIONGAP 10 7 10   MIKE 9.4 8.9 9.3   * 153* 96       ASSESSMENT/PLAN:  (R29.158) Weakness of both lower extremities  (primary encounter diagnosis)  Comment: ongoing. occ sig. Increase in weakness with difficulty transferring. Family feel EZ stand may be needed on prn basis. Did get new w.c.  Plan: 1. Refer to PT, OT  2. Cont. Current pain meds  3. Encourage amb. With gait belt, walker in apt. As able  4.  Check UA/UC to r/o UTI    (I10) Benign essential hypertension  Comment: BP stable  Plan: 1. Cont. norvasc  2. Follow BPs, HRs  3. Encourage fluids  4. Monitor for dizziness    (F32.A) Depression, unspecified depression type  Comment: per dtr, appears happier. No current reports of GI distress  Plan: 1. Cont. Cymbalta  2. Reassess mood over next few weeks  3. Monitor for negative statements of wanting to die    Electronically signed by:  RUSTAM Heck CNP         Documentation of Face-to-Face and Certification for Home Health Services     Patient: Lynn Soriano   YOB: 1929  MR Number: [id number]  Today's Date: 6/12/2023    I certify that patient: Lynn Soriano is under my care and that I, or a nurse practitioner or physician's assistant working with me, had a face-to-face encounter that meets the physician face-to-face encounter requirements with this patient on: 6/12/2023.    This encounter with the patient was in whole, or in part, for the following medical condition, which is the primary reason for home health care: difficulty with transfers.    I certify that, based on my findings, the following services are medically necessary home health services: Occupational Therapy and Physical Therapy.    My clinical findings support the need for the above services because: Occupational Therapy Services are needed to assess and treat cognitive ability and address ADL safety due to impairment in mobility. and Physical Therapy Services are needed to assess and treat the following functional impairments: LE weakness, difficulty with transfers.    Further, I certify that my clinical findings support that this patient is homebound (i.e. absences from home require considerable and taxing effort and are for medical reasons or [facility] services or infrequently or of short duration when for other reasons) because: Requires assistance of another person or specialized equipment to access medical  services because patient:  requires assist with transfers..    Based on the above findings. I certify that this patient is confined to the home and needs intermittent skilled nursing care, physical therapy and/or speech therapy.  The patient is under my care, and I have initiated the establishment of the plan of care.  This patient will be followed by a physician who will periodically review the plan of care.  Physician/Provider to provide follow up care: Radu Pierce    Responsible Medicare certified PECOS Physician: Shivani Cagle  Physician Signature: See electronic signature associated with these discharge orders.  Date: 6/12/2023

## 2023-06-28 NOTE — LETTER
"    6/28/2023        RE: Lynn Soriano  [address]  [place] [address]        Lynn Soriano is a 89 year old female seen June 28, 2023 at [facility] where she has resided for 4 years (admit 2019) seen to follow up right hand swelling    Pt is seen in her apartment up to recliner with her daughter Ivonne present and she helps with history   Pt had a fall on June 18, assist up from floor by staff.   Initially no reported injuries, but now notes left knee and right hand pain and swelling.  Present over past couple of days but worse this morning.    Sometimes she can stand on her own, others needs a 2 person max assist.          Pt has had cognitive decline, episodes of confusion.   Family helps with appointments and she has med administration by AL staff.    Daughters have been concerned about depression and pt agrees, related to decline in independence and function, and loss of peers  Started on duloxetine last month for this, ?helping.   Pt /family have an interest in Hospice although pt likely does not meet criteria.     By chart review, pt has been followed by [facility]ology for Raynaud's, CTS and chondrocalcinosis.   Last seen in October 2022 and started on colchicine for the latter after x-ray showed CPPD crystals   Pt reports pain and stiffness in her hands but has not had ischemia from the Raynaud's   Tx'd with amlodipine.    Pt was seen in the [facility] ED in March 2023 after she suffered several falls.  Legs \"wiggling and weak\"   X-rays negative for fracture, but head CT showed chronic cerebellar infarct as below.   Has transitioned to  more since then, had a course of [facility] Physical Therapy.        Past Medical History:   Diagnosis Date     Anemia      Closed wedge compression fracture of T10 vertebra (H) 2018    vertebroplasty     GERD (gastroesophageal reflux disease)      Herpes zoster      History of vertebroplasty 03/10/2018    T10     Raynaud's syndrome      Recurrent epistaxis  " "    Senile osteoporosis 2015     Past Surgical History:   Procedure Laterality Date     IR LUMBAR EPIDURAL STEROID INJECTION  8/4/2003     IR LUMBAR EPIDURAL STEROID INJECTION  1/28/2004     IR LUMBAR EPIDURAL STEROID INJECTION  9/14/2004     IR LUMBAR EPIDURAL STEROID INJECTION  9/14/2004     IR LUMBAR EPIDURAL STEROID INJECTION  9/6/2005     IR LUMBAR EPIDURAL STEROID INJECTION  9/6/2005     IR THORACIC VERTEBROPLASTY  3/20/2018     VERTEBROPLASTY       SH:  , lives alone in [place]   She has 8 living children.   Originally had 5 sons and 4 daughters.    Her sister Alicia also lives at [facility]   Non smoker     ROS:  CPT 4.8 >>>now 4.2-4.6      BCRS 3.5 >>>now 4 on recent testing    Uses 4WW in her apartment, WC for distance  H/o constipation  Wt Readings from Last 5 Encounters:   06/28/23 50.3 kg (111 lb)   05/15/23 50.3 kg (111 lb)   04/17/23 57.6 kg (127 lb)   03/13/23 59 kg (130 lb)   10/27/22 57.7 kg (127 lb 1.6 oz)      EXAM: NAD  /74   Pulse 99   Temp 97.4  F (36.3  C)   Resp 18   Ht 1.6 m (5' 3\")   Wt 50.3 kg (111 lb)   SpO2 97%   BMI 19.66 kg/m     Neck supple without adenopathy  +kyphosis  Lungs with decreased BS, fine rales in the bases  Heart RRR s1s2  Abd soft, NT, no distention or guarding, +BS  Right hand is warm and red almost to elbow   Index MCP most involved.  No streaking   Ext with 1+ ankle edema, wearing compression stockings.  Bruising from left knee all down left calf   Neuro: +STML  Psych: affect okay, pleasant     Labs reviewed, unremarkable in March 2023        CT SCAN OF THE HEAD WITHOUT CONTRAST   3/6/2023   HISTORY: Fall, head trauma.  FINDINGS:  No evidence of intracranial hemorrhage. Volume loss and  confluent white matter hypoattenuation which likely represents  advanced chronic small vessel ischemic change. Small chronic-appearing  right cerebellar infarct, new compared to the prior. Small dural-based  calcification along the left frontal lobe, presumably " benign,  unchanged. No acute osseous abnormality. Mild paranasal sinus mucosal  thickening with probable left maxillary sinus retention cyst. Probable  tiny right frontal scalp contusion without underlying skull fracture.                                                               IMP/PLAN:   (W19.XXXA) Fall, initial encounter   Comment: with right knee bruising and pain   Plan: x-ray   Ice, elevation, scheduled acetaminophen for pain   AL staff to assist with transfers and mobility     (M11.20) Chondrocalcinosis  Comment: CPPD crystals on x-ray   Pain and swelling of right hand, may be related to fall or to flare of pseudogout   Plan: check x-ray   Rheumatology recommended colchicine 0.6 mg/day but this has not been started.     Will start today give recurrent symptoms >>>if no improvement next 1-2 days would start abx          (I73.00) Raynaud's disease without gangrene    Comment: with pain, but no h/o ischemia     Plan: uses heat to hands prn   Amlodipine 5 mg/day      (M11.20) Chondrocalcinosis  Comment: CPPD crystals on x-ray   Pain and stiffness  Plan: Rheumatology recommended colchicine 0.6 mg/day but this has not been started.     Will start today give recurrent symptoms         (R29.898) Weakness of both lower extremities  (R29.6) Recurrent falls  Comment: decline in mobility, variable symptoms, at times needs 2 persons for transfers and then self transfers at times     Plan: has had a course of Physical Therapy /Occupational Therapy     (G31.84) MCI (mild cognitive impairment)  Comment: decline in cognitive testing as above   Plan: AL and family support for med admin, meals, activity, appointments    (M81.0) Age-related osteoporosis without current pathological fracture  Comment: h/o T10 and L1 compression fractures   Plan: would consider adding vit D       (I10) Benign essential hypertension  Comment: new dx   SBPs 120-130s     Plan: amlodipine 5 mg/day      (I67.9) Cerebral vascular disease  Comment:  cerebellar infarct     Plan: consider addition of daily ASA for secondary prevention     (F43.21) Adjustment disorder with depressed mood  Comment: many recent stressors   Plan: duloxetine 30 mg/day - for pain and depression     Shivani Cagle MD         Sincerely,        Shivani Cagle MD

## 2023-06-29 NOTE — PROGRESS NOTES
[facility]  [facility] Medical Record Number:  [id number]  Place of Service where encounter took place:  [facility]  [facility] ([facility]) [[id number]]  Chief Complaint   Patient presents with     Pain       HPI:    Lynn Soriano  is a 89 year old (3/3/1929), who is being seen today for an episodic care visit.  HPI information obtained from: facility chart records, facility staff, patient report, [facility] chart review and family/first contact son report. Today's concern is: pseudogout R hand, pain, weakness. Has h/o pseudogout. Has taken colchicine in past. Earlier this week had edema, increased redness, pain of R hand, wrist. Started on colchicine yesterday. Redness, warmth improved today. Ongoing L knee pain. S/p fall, bruising of L knee. XR neg for acute changes. Varying need of assist with transfers, at times needs assist of 2, at times transfers, amb. Short distances with walker. Cont. With PT. Has difficulty repositioning in bed, transferring into/out of bed. Due to LE weakness. Requires electric hosp. Bed.       Past Medical and Surgical History reviewed in Epic today.    MEDICATIONS:    Current Outpatient Medications   Medication Sig Dispense Refill     colchicine (COLCRYS) 0.6 MG tablet Take 0.6 mg by mouth 2 times daily       ACETAMINOPHEN EXTRA STRENGTH 500 MG tablet TAKE 2 TABLETS (1000MG) BY MOUTH EVERY MORNING;& TAKE 2 TABLETS (1G) BY MOUTH ONCE DAILY AS NEEDED 360 tablet 97     amLODIPine (NORVASC) 5 MG tablet Take 1 tablet (5 mg) by mouth daily 90 tablet 3     Calcium Carb-Cholecalciferol (CALCIUM PLUS VITAMIN D3) 600-12.5 MG-MCG CAPS Take 2 capsules by mouth daily 180 capsule 3     docusate sodium (COLACE) 100 MG capsule Take 200 mg by mouth daily       docusate sodium (COLACE) 100 MG capsule Take 100 mg by mouth daily as needed for constipation       DULoxetine (CYMBALTA) 30 MG capsule Take 1 capsule (30 mg) by mouth daily 90 capsule 3     Multiple Vitamin (TAB-A-JULIA)  TABS TAKE 1 TABLET BY MOUTH ONCE DAILY 90 tablet 97     potassium chloride ER (K-TAB/KLOR-CON) 10 MEQ CR tablet TAKE 1 TABLET BY MOUTH ONCE DAILY 90 tablet 97         REVIEW OF SYSTEMS:  No chest pain, shortness of breath, fevers, chills, headache, nausea, vomiting, dysuria or bowel abnormalities.  Appetite is  fair.  No pain except R hand at times, L knee.    Objective:  /55   Pulse 72   Resp 16   SpO2 96%   Exam:  GENERAL APPEARANCE:  Alert, in no distress, cooperative  ENT:  Mouth and posterior oropharynx normal, moist mucous membranes, [place]  EYES:  EOM, conjunctivae, lids, pupils and irises normal, PERRL  RESP:  respiratory effort and palpation of chest normal, lungs clear to auscultation , no respiratory distress  CV:  Palpation and auscultation of heart done , regular rate and rhythm, no murmur, rub, or gallop, peripheral edema 1+ in R hand  ABDOMEN:  normal bowel sounds, soft, nontender, no hepatosplenomegaly or other masses, no guarding or rebound  M/S:   muscle strength 5/5 all 4 ext, gen. LE weakness. pain with palp. R hand, L knee  SKIN:  some redness to R hand, forearm, less compared to yesterday, minimal increase in warmth  NEURO:   Cranial nerves 2-12 are normal tested and grossly at patient's baseline, no tremor  PSYCH:  insight and judgement impaired, memory impaired , affect and mood normal    Labs:     Most Recent 3 CBC's:Recent Labs   Lab Test 03/06/23  1441 02/07/22  1550 10/24/19  1044   WBC 5.3 6.6 5.9   HGB 12.8 13.1 14.1   MCV 98 102* 97    209 186     Most Recent 3 BMP's:Recent Labs   Lab Test 03/06/23  1441 05/06/22  1457 02/07/22  1550    141 141   POTASSIUM 4.0 3.7 4.0   CHLORIDE 104 106 107   CO2 27 28 24   BUN 17.8 17 27   CR 0.62 0.52 0.69   ANIONGAP 10 7 10   MIKE 9.4 8.9 9.3   * 153* 96       ASSESSMENT/PLAN:  (M11.241) Pseudogout of hand, right  (primary encounter diagnosis)  Comment: redness, edema, pain.  Improved with start of colchicine.  Plan: 1.  Cont. Colchicine x 1 month  2. Staff to monitor R hand/forearm for increased redness, warmth  3. For above s/s, start keflex 250mg tid x 7 days for possible cellulitis  4. Cont. To elevate RUE    (R52) Pain  Comment: R hand pain more stable. Ongoing L knee pain. XR neg. S/p fall  Plan: 1. Cont. Sched,  Prn tylenol  2. Monitor for further increase in L knee pain-may sched. Tylenol  3. Monitor for increase in LE edema    (R29.898) Weakness of both lower extremities  Comment: assist with transfers. Recent falls. Difficulty with bed mobility, transfers into/out of bed  Plan:1. Cont. PT  2. Plans to have hospice chart review for referral. If not signed onto hospice start orders for fully electric hosp. Bed.     Electronically signed by:  RUSTAM Heck CNP

## 2023-06-29 NOTE — LETTER
6/29/2023        RE: Lynn Soriano  [address]  [place] [address]        [facility]  [facility] Medical Record Number:  [id number]  Place of Service where encounter took place:  [facility]  [email or web address] ([facility]) [[id number]]  Chief Complaint   Patient presents with     Pain       HPI:    Lynn Soriano  is a 89 year old (3/3/1929), who is being seen today for an episodic care visit.  HPI information obtained from: facility chart records, facility staff, patient report, [facility] chart review and family/first contact son report. Today's concern is: pseudogout R hand, pain, weakness. Has h/o pseudogout. Has taken colchicine in past. Earlier this week had edema, increased redness, pain of R hand, wrist. Started on colchicine yesterday. Redness, warmth improved today. Ongoing L knee pain. S/p fall, bruising of L knee. XR neg for acute changes. Varying need of assist with transfers, at times needs assist of 2, at times transfers, amb. Short distances with walker. Cont. With PT. Has difficulty repositioning in bed, transferring into/out of bed. Due to LE weakness. Requires electric hosp. Bed.       Past Medical and Surgical History reviewed in Epic today.    MEDICATIONS:    Current Outpatient Medications   Medication Sig Dispense Refill     colchicine (COLCRYS) 0.6 MG tablet Take 0.6 mg by mouth 2 times daily       ACETAMINOPHEN EXTRA STRENGTH 500 MG tablet TAKE 2 TABLETS (1000MG) BY MOUTH EVERY MORNING;& TAKE 2 TABLETS (1G) BY MOUTH ONCE DAILY AS NEEDED 360 tablet 97     amLODIPine (NORVASC) 5 MG tablet Take 1 tablet (5 mg) by mouth daily 90 tablet 3     Calcium Carb-Cholecalciferol (CALCIUM PLUS VITAMIN D3) 600-12.5 MG-MCG CAPS Take 2 capsules by mouth daily 180 capsule 3     docusate sodium (COLACE) 100 MG capsule Take 200 mg by mouth daily       docusate sodium (COLACE) 100 MG capsule Take 100 mg by mouth daily as needed for constipation       DULoxetine (CYMBALTA) 30 MG  capsule Take 1 capsule (30 mg) by mouth daily 90 capsule 3     Multiple Vitamin (TAB-A-JULIA) TABS TAKE 1 TABLET BY MOUTH ONCE DAILY 90 tablet 97     potassium chloride ER (K-TAB/KLOR-CON) 10 MEQ CR tablet TAKE 1 TABLET BY MOUTH ONCE DAILY 90 tablet 97         REVIEW OF SYSTEMS:  No chest pain, shortness of breath, fevers, chills, headache, nausea, vomiting, dysuria or bowel abnormalities.  Appetite is  fair.  No pain except R hand at times, L knee.    Objective:  /55   Pulse 72   Resp 16   SpO2 96%   Exam:  GENERAL APPEARANCE:  Alert, in no distress, cooperative  ENT:  Mouth and posterior oropharynx normal, moist mucous membranes, [place]  EYES:  EOM, conjunctivae, lids, pupils and irises normal, PERRL  RESP:  respiratory effort and palpation of chest normal, lungs clear to auscultation , no respiratory distress  CV:  Palpation and auscultation of heart done , regular rate and rhythm, no murmur, rub, or gallop, peripheral edema 1+ in R hand  ABDOMEN:  normal bowel sounds, soft, nontender, no hepatosplenomegaly or other masses, no guarding or rebound  M/S:   muscle strength 5/5 all 4 ext, gen. LE weakness. pain with palp. R hand, L knee  SKIN:  some redness to R hand, forearm, less compared to yesterday, minimal increase in warmth  NEURO:   Cranial nerves 2-12 are normal tested and grossly at patient's baseline, no tremor  PSYCH:  insight and judgement impaired, memory impaired , affect and mood normal    Labs:     Most Recent 3 CBC's:Recent Labs   Lab Test 03/06/23  1441 02/07/22  1550 10/24/19  1044   WBC 5.3 6.6 5.9   HGB 12.8 13.1 14.1   MCV 98 102* 97    209 186     Most Recent 3 BMP's:Recent Labs   Lab Test 03/06/23  1441 05/06/22  1457 02/07/22  1550    141 141   POTASSIUM 4.0 3.7 4.0   CHLORIDE 104 106 107   CO2 27 28 24   BUN 17.8 17 27   CR 0.62 0.52 0.69   ANIONGAP 10 7 10   MIKE 9.4 8.9 9.3   * 153* 96       ASSESSMENT/PLAN:  (M11.241) Pseudogout of hand, right  (primary encounter  diagnosis)  Comment: redness, edema, pain.  Improved with start of colchicine.  Plan: 1. Cont. Colchicine x 1 month  2. Staff to monitor R hand/forearm for increased redness, warmth  3. For above s/s, start keflex 250mg tid x 7 days for possible cellulitis  4. Cont. To elevate RUE    (R52) Pain  Comment: R hand pain more stable. Ongoing L knee pain. XR neg. S/p fall  Plan: 1. Cont. Prn tylenol  2. Monitor for further increase in L knee pain-may sched. Tylenol  3. Monitor for increase in LE edema    (R29.898) Weakness of both lower extremities  Comment: assist with transfers. Recent falls. Difficulty with bed mobility, transfers into/out of bed  Plan:1. Cont. PT  2. Plans to have hospice chart review for referral. If not signed onto hospice start orders for fully electric hosp. Bed.     Electronically signed by:  RUSTAM Heck CNP               Sincerely,        RUSTAM Heck CNP

## 2023-07-12 NOTE — PROGRESS NOTES
"Lynn Soriano is a 89 year old female seen June 28, 2023 at [facility] where she has resided for 4 years (admit 2019) seen to follow up right hand swelling    Pt is seen in her apartment up to recliner with her daughter Ivonne present and she helps with history   Pt had a fall on June 18, assist up from floor by staff.   Initially no reported injuries, but now notes left knee and right hand pain and swelling.  Present over past couple of days but worse this morning.    Sometimes she can stand on her own, others needs a 2 person max assist.          Pt has had cognitive decline, episodes of confusion.   Family helps with appointments and she has med administration by AL staff.    Daughters have been concerned about depression and pt agrees, related to decline in independence and function, and loss of peers  Started on duloxetine last month for this, ?helping.   Pt /family have an interest in Hospice although pt likely does not meet criteria.     By chart review, pt has been followed by [facility] Rheumatology for Raynaud's, CTS and chondrocalcinosis.   Last seen in October 2022 and started on colchicine for the latter after x-ray showed CPPD crystals   Pt reports pain and stiffness in her hands but has not had ischemia from the Raynaud's   Tx'd with amlodipine.    Pt was seen in the [facility] ED in March 2023 after she suffered several falls.  Legs \"wiggling and weak\"   X-rays negative for fracture, but head CT showed chronic cerebellar infarct as below.   Has transitioned to  more since then, had a course of [facility] Physical Therapy.        Past Medical History:   Diagnosis Date     Anemia      Closed wedge compression fracture of T10 vertebra (H) 2018    vertebroplasty     GERD (gastroesophageal reflux disease)      Herpes zoster      History of vertebroplasty 03/10/2018    T10     Raynaud's syndrome      Recurrent epistaxis      Senile osteoporosis 2015     Past Surgical History:   Procedure Laterality Date     IR " "LUMBAR EPIDURAL STEROID INJECTION  8/4/2003     IR LUMBAR EPIDURAL STEROID INJECTION  1/28/2004     IR LUMBAR EPIDURAL STEROID INJECTION  9/14/2004     IR LUMBAR EPIDURAL STEROID INJECTION  9/14/2004     IR LUMBAR EPIDURAL STEROID INJECTION  9/6/2005     IR LUMBAR EPIDURAL STEROID INJECTION  9/6/2005     IR THORACIC VERTEBROPLASTY  3/20/2018     VERTEBROPLASTY       SH:  , lives alone in [place]   She has 8 living children.   Originally had 5 sons and 4 daughters.    Her sister Alicia also lives at [facility]   Non smoker     ROS:  CPT 4.8 >>>now 4.2-4.6      BCRS 3.5 >>>now 4 on recent testing    Uses 4WW in her apartment, WC for distance  H/o constipation  Wt Readings from Last 5 Encounters:   06/28/23 50.3 kg (111 lb)   05/15/23 50.3 kg (111 lb)   04/17/23 57.6 kg (127 lb)   03/13/23 59 kg (130 lb)   10/27/22 57.7 kg (127 lb 1.6 oz)      EXAM: NAD  /74   Pulse 99   Temp 97.4  F (36.3  C)   Resp 18   Ht 1.6 m (5' 3\")   Wt 50.3 kg (111 lb)   SpO2 97%   BMI 19.66 kg/m     Neck supple without adenopathy  +kyphosis  Lungs with decreased BS, fine rales in the bases  Heart RRR s1s2  Abd soft, NT, no distention or guarding, +BS  Right hand is warm and red almost to elbow   Index MCP most involved.  No streaking   Ext with 1+ ankle edema, wearing compression stockings.  Bruising from left knee all down left calf   Neuro: +STML  Psych: affect okay, pleasant     Labs reviewed, unremarkable in March 2023        CT SCAN OF THE HEAD WITHOUT CONTRAST   3/6/2023   HISTORY: Fall, head trauma.  FINDINGS:  No evidence of intracranial hemorrhage. Volume loss and  confluent white matter hypoattenuation which likely represents  advanced chronic small vessel ischemic change. Small chronic-appearing  right cerebellar infarct, new compared to the prior. Small dural-based  calcification along the left frontal lobe, presumably benign,  unchanged. No acute osseous abnormality. Mild paranasal sinus mucosal  thickening with " probable left maxillary sinus retention cyst. Probable  tiny right frontal scalp contusion without underlying skull fracture.                                                               IMP/PLAN:   (W19.XXXA) Fall, initial encounter   Comment: with right knee bruising and pain   Plan: x-ray   Ice, elevation, scheduled acetaminophen for pain   AL staff to assist with transfers and mobility     (M11.20) Chondrocalcinosis  Comment: CPPD crystals on x-ray   Pain and swelling of right hand, may be related to fall or to flare of pseudogout   Plan: check x-ray   Rheumatology recommended colchicine 0.6 mg/day but this has not been started.     Will start today give recurrent symptoms >>>if no improvement next 1-2 days would start abx          (I73.00) Raynaud's disease without gangrene    Comment: with pain, but no h/o ischemia     Plan: uses heat to hands prn   Amlodipine 5 mg/day      (M11.20) Chondrocalcinosis  Comment: CPPD crystals on x-ray   Pain and stiffness  Plan: Rheumatology recommended colchicine 0.6 mg/day but this has not been started.     Will start today give recurrent symptoms         (R29.898) Weakness of both lower extremities  (R29.6) Recurrent falls  Comment: decline in mobility, variable symptoms, at times needs 2 persons for transfers and then self transfers at times     Plan: has had a course of Physical Therapy /Occupational Therapy     (G31.84) MCI (mild cognitive impairment)  Comment: decline in cognitive testing as above   Plan: AL and family support for med admin, meals, activity, appointments    (M81.0) Age-related osteoporosis without current pathological fracture  Comment: h/o T10 and L1 compression fractures   Plan: would consider adding vit D       (I10) Benign essential hypertension  Comment: new dx   SBPs 120-130s     Plan: amlodipine 5 mg/day      (I67.9) Cerebral vascular disease  Comment: cerebellar infarct     Plan: consider addition of daily ASA for secondary prevention      (F43.21) Adjustment disorder with depressed mood  Comment: many recent stressors   Plan: duloxetine 30 mg/day - for pain and depression     Shivani Cagle MD

## 2023-10-05 NOTE — PROGRESS NOTES
[place] [facility]  [place] [facility] Record Number:  [id number]  Place of Service where encounter took place:  [facility]  [facility] ([facility]) [[id number]]  Chief Complaint   Patient presents with    Abdominal Pain       HPI:    Lynn Soriano  is a 89 year old (3/3/1929), who is being seen today for an episodic care visit.  HPI information obtained from: facility chart records, facility staff, patient report, [facility] chart review, and family/first contact son report. Today's concern is: abd pain, urinary retention, HTN. Cont. On hospice cares. Noted to have no UOP past couple days, some abd distention yesterday. Hospice place [facility]. Had 800 ml return.  Today has UOP, however decreased per [facility]. Reports ongoing abd pain.  Has abd distention. Last BM yesterday-small. Cont. On colace for constipation.  Has prn MS for pain. For HTN cont. On norvasc. BP elevated today, however likely r/t pain.        Past Medical and Surgical History reviewed in Epic today.    MEDICATIONS:    Current Outpatient Medications   Medication Sig Dispense Refill    bisacodyl (DULCOLAX) 10 MG suppository Place 10 mg rectally daily as needed for constipation      haloperidol (HALDOL) 0.5 MG tablet Take 0.5 mg by mouth every hour as needed for agitation      LORazepam (ATIVAN) 0.5 MG tablet Take 0.5 mg by mouth every hour as needed for anxiety      morphine 5 MG solu-tab Take 5 mg by mouth every hour as needed for shortness of breath or moderate to severe pain      simethicone (MYLICON) 125 MG chewable tablet Take 125 mg by mouth 2 times daily      ACETAMINOPHEN EXTRA STRENGTH 500 MG tablet TAKE 2 TABLETS (1000MG) BY MOUTH EVERY MORNING;& TAKE 2 TABLETS (1G) BY MOUTH ONCE DAILY AS NEEDED 360 tablet 97    amLODIPine (NORVASC) 5 MG tablet Take 1 tablet (5 mg) by mouth daily 90 tablet 3    Calcium Carb-Cholecalciferol (CALCIUM PLUS VITAMIN D3) 600-12.5 MG-MCG CAPS Take 2 capsules by mouth daily 180 capsule 3    docusate  "sodium (COLACE) 100 MG capsule Take 2 capsules (200 mg) by mouth daily 62 capsule 11    docusate sodium (COLACE) 100 MG capsule Take 1 capsule (100 mg) by mouth daily as needed for constipation 31 capsule 11    DULoxetine (CYMBALTA) 30 MG capsule Take 1 capsule (30 mg) by mouth daily 90 capsule 3    Multiple Vitamin (TAB-A-JULIA) TABS TAKE 1 TABLET BY MOUTH ONCE DAILY 90 tablet 97    potassium chloride ER (K-TAB/KLOR-CON) 10 MEQ CR tablet TAKE 1 TABLET BY MOUTH ONCE DAILY 90 tablet 97         REVIEW OF SYSTEMS:  CONSTITUTIONAL:  poor appetite, fatigue, and body aches, EYES:  neg, ENT:  [place], CV:  neg, RESPIRATORY: neg, :  recent anuria. Current [place], GI:  abdominal pain and excessive gas or bloating, NEURO:  neg, PSYCH: neg, and MUSCULOSKELETAL: neg    Objective:  BP (!) 155/87   Pulse (!) 124   Resp 22   Ht 1.6 m (5' 3\")   SpO2 92%   BMI 19.66 kg/m    Exam:  GENERAL APPEARANCE:  Alert, cooperative  ENT:  Mouth and posterior oropharynx normal, moist mucous membranes, [place]  EYES:  EOM, conjunctivae, lids, pupils and irises normal, PERRL  RESP:  respiratory effort and palpation of chest normal, lungs clear to auscultation , no respiratory distress, diminished breath sounds bibasilar  CV:  Palpation and auscultation of heart done , no edema, tachycardic   ABDOMEN:  distention. Active bowels sounds upper quadrants, less bowel sounds lower quadrants.  Pain with palp.  M/S:   gen. Muscle weakness. No contracture  NEURO:   Cranial nerves 2-12 are normal tested and grossly at patient's baseline, no tremor  PSYCH:  affect and mood normal, sl anxious     Labs:   Most Recent 3 BMP's:  Recent Labs   Lab Test 03/06/23  1441 05/06/22  1457 02/07/22  1550    141 141   POTASSIUM 4.0 3.7 4.0   CHLORIDE 104 106 107   CO2 27 28 24   BUN 17.8 17 27   CR 0.62 0.52 0.69   ANIONGAP 10 7 10   MIKE 9.4 8.9 9.3   * 153* 96       ASSESSMENT/PLAN:  (R10.84) Abdominal pain, generalized  (primary encounter " diagnosis)  Comment: newer onset. Distention starting yesterday.   Plan: 1. Bisacodyl supp x 1 now.  Start every day prn as well  2. Cont. Sched colace, prn senna  3. Start simethicone  4. Cont. Prn MS for pain  5. For nausea, use prn ativan    (R33.9) Urinary retention  Comment: newer onset. Decrease UOP. Clayton placed yesterday had 800 ml return. Clayton appears patent today.  Plan: 1. Cont. Clayton cath  2. Monitor for bladder pain  3. Monitor for fever    (I10) Benign essential hypertension  Comment: BP elevated today as is HR, likely r/t pain  Plan: 1. Cont. norvasc  2. Follow Bps, Hrs  3. Encourage fluids  4. Cont. Hospice cares            Electronically signed by:  RUSTAM Heck CNP

## 2023-10-05 NOTE — LETTER
10/5/2023        RE: Lynn Soriano  [address]  [facility] [address]        [place] [facility]  [place] [facility] Record Number:  [id number]  Place of Service where encounter took place:  [facility]  [employer] ([facility]) [[id number]]  Chief Complaint   Patient presents with     Abdominal Pain       HPI:    Lynn Soriano  is a 89 year old (3/3/1929), who is being seen today for an episodic care visit.  HPI information obtained from: facility chart records, facility staff, patient report, [facility] chart review, and family/first contact son report. Today's concern is: abd pain, urinary retention, HTN. Cont. On hospice cares. Noted to have no UOP past couple days, some abd distention yesterday. Hospice place [facility]. Had 800 ml return.  Today has UOP, however decreased per [facility]. Reports ongoing abd pain.  Has abd distention. Last BM yesterday-small. Cont. On colace for constipation.  Has prn MS for pain. For HTN cont. On norvasc. BP elevated today, however likely r/t pain.        Past Medical and Surgical History reviewed in Epic today.    MEDICATIONS:    Current Outpatient Medications   Medication Sig Dispense Refill     bisacodyl (DULCOLAX) 10 MG suppository Place 10 mg rectally daily as needed for constipation       haloperidol (HALDOL) 0.5 MG tablet Take 0.5 mg by mouth every hour as needed for agitation       LORazepam (ATIVAN) 0.5 MG tablet Take 0.5 mg by mouth every hour as needed for anxiety       morphine 5 MG solu-tab Take 5 mg by mouth every hour as needed for shortness of breath or moderate to severe pain       simethicone (MYLICON) 125 MG chewable tablet Take 125 mg by mouth 2 times daily       ACETAMINOPHEN EXTRA STRENGTH 500 MG tablet TAKE 2 TABLETS (1000MG) BY MOUTH EVERY MORNING;& TAKE 2 TABLETS (1G) BY MOUTH ONCE DAILY AS NEEDED 360 tablet 97     amLODIPine (NORVASC) 5 MG tablet Take 1 tablet (5 mg) by mouth daily 90 tablet 3     Calcium Carb-Cholecalciferol (CALCIUM  "PLUS VITAMIN D3) 600-12.5 MG-MCG CAPS Take 2 capsules by mouth daily 180 capsule 3     docusate sodium (COLACE) 100 MG capsule Take 2 capsules (200 mg) by mouth daily 62 capsule 11     docusate sodium (COLACE) 100 MG capsule Take 1 capsule (100 mg) by mouth daily as needed for constipation 31 capsule 11     DULoxetine (CYMBALTA) 30 MG capsule Take 1 capsule (30 mg) by mouth daily 90 capsule 3     Multiple Vitamin (TAB-A-JULIA) TABS TAKE 1 TABLET BY MOUTH ONCE DAILY 90 tablet 97     potassium chloride ER (K-TAB/KLOR-CON) 10 MEQ CR tablet TAKE 1 TABLET BY MOUTH ONCE DAILY 90 tablet 97         REVIEW OF SYSTEMS:  CONSTITUTIONAL:  poor appetite, fatigue, and body aches, EYES:  neg, ENT:  [place], CV:  neg, RESPIRATORY: neg, :  recent anuria. Current [place], GI:  abdominal pain and excessive gas or bloating, NEURO:  neg, PSYCH: neg, and MUSCULOSKELETAL: neg    Objective:  BP (!) 155/87   Pulse (!) 124   Resp 22   Ht 1.6 m (5' 3\")   SpO2 92%   BMI 19.66 kg/m    Exam:  GENERAL APPEARANCE:  Alert, cooperative  ENT:  Mouth and posterior oropharynx normal, moist mucous membranes, [place]  EYES:  EOM, conjunctivae, lids, pupils and irises normal, PERRL  RESP:  respiratory effort and palpation of chest normal, lungs clear to auscultation , no respiratory distress, diminished breath sounds bibasilar  CV:  Palpation and auscultation of heart done , no edema, tachycardic   ABDOMEN:  distention. Active bowels sounds upper quadrants, less bowel sounds lower quadrants.  Pain with palp.  M/S:   gen. Muscle weakness. No contracture  NEURO:   Cranial nerves 2-12 are normal tested and grossly at patient's baseline, no tremor  PSYCH:  affect and mood normal, sl anxious     Labs:   Most Recent 3 BMP's:  Recent Labs   Lab Test 03/06/23  1441 05/06/22  1457 02/07/22  1550    141 141   POTASSIUM 4.0 3.7 4.0   CHLORIDE 104 106 107   CO2 27 28 24   BUN 17.8 17 27   CR 0.62 0.52 0.69   ANIONGAP 10 7 10   MIKE 9.4 8.9 9.3   * 153* " 96       ASSESSMENT/PLAN:  (R10.84) Abdominal pain, generalized  (primary encounter diagnosis)  Comment: newer onset. Distention starting yesterday.   Plan: 1. Bisacodyl supp x 1 now.  Start every day prn as well  2. Cont. Sched colace, prn senna  3. Start simethicone  4. Cont. Prn MS for pain  5. For nausea, use prn ativan    (R33.9) Urinary retention  Comment: newer onset. Decrease UOP. Clayton placed yesterday had 800 ml return. Clayton appears patent today.  Plan: 1. Cont. Clayton cath  2. Monitor for bladder pain  3. Monitor for fever    (I10) Benign essential hypertension  Comment: BP elevated today as is HR, likely r/t pain  Plan: 1. Cont. norvasc  2. Follow Bps, Hrs  3. Encourage fluids  4. Cont. Hospice cares            Electronically signed by:  RUSTAM Heck CNP              Sincerely,        RUSTAM Heck CNP

## 2024-12-09 NOTE — PROGRESS NOTES
Office Visit - Follow up    Lynn Soriano   89 y.o. female    Date of Visit: 4/23/2019    Chief Complaint   Patient presents with     Follow-up     fasting     Medication Refill       Subjective: Lynn is here for regular follow-up.  History of osteoarthritis she does use primarily acetaminophen for this with good relief history of osteoporosis on alendronate stable she has tolerated this well.  History of reflux esophagitis and chronic dyspepsia stable on omeprazole.  Intermittent urinary incontinence stable no change.  Long-standing history of anemia with chronic renal insufficiency stable    Labs from last fall are reviewed    ROS: A comprehensive review of systems was performed and was otherwise negative except as mentioned above.     Exam  She appears well without major cognitive deficit.  Vital signs are stable and reviewed alert and oriented head and neck normal breasts negative heart and lungs normal   /74 (Patient Site: Right Arm, Patient Position: Sitting, Cuff Size: Adult Regular)   Pulse 82   Wt 126 lb 1.9 oz (57.2 kg)   SpO2 99%   Breastfeeding? No   BMI 25.47 kg/m      Assessment and Plan  No change we will continue with current regimen.  We will continue with potassium supplement at a low dose of 10 mEq daily even though she is not on a diuretic she has been hypokalemic in the past.    We discussed concerns regarding driving.  Her family had expressed concerns about her ability to drive and we had a long discussion about this.  She will confine her driving to city streets during daylight going short distances to familiar locations.  She will not drive the highways and we will readdress the concerns in the fall although likely is winter will be coming we may determine that she may not be able to drive further    May consider a formal driving evaluation    No other changes comprehensive labs will be repeated in the fall    Lynn was seen today for follow-up and medication  refill.    Diagnoses and all orders for this visit:    Essential hypertension    Osteoporosis  -     alendronate (FOSAMAX) 70 MG tablet; Take 1 tablet (70 mg total) by mouth every 7 days. Take in the morning on an empty stomach with a full glass of water 30 minutes before food  -     oxybutynin (DITROPAN XL) 10 MG ER tablet; Take 1 tablet (10 mg total) by mouth daily.  -     omeprazole (PRILOSEC) 20 MG capsule; Take 1 capsule (20 mg total) by mouth daily.    Hypokalemia  -     oxybutynin (DITROPAN XL) 10 MG ER tablet; Take 1 tablet (10 mg total) by mouth daily.  -     omeprazole (PRILOSEC) 20 MG capsule; Take 1 capsule (20 mg total) by mouth daily.  -     potassium chloride (KLOR-CON 10) 10 MEQ CR tablet; Take 1 tablet (10 mEq total) by mouth daily.    Raynaud's disease without gangrene    Anemia in stage 1 chronic kidney disease    Mechanical low back pain    Gastroesophageal reflux disease without esophagitis    Closed compression fracture of first lumbar vertebra, initial encounter (H)          Time: total time spent with the patient was 25 minutes of which >50% was spent in counseling and coordination of care        Allergies   Allergen Reactions     Penicillins        Medications :  Prior to Admission medications    Medication Sig Start Date End Date Taking? Authorizing Provider   acetaminophen (TYLENOL) 650 MG CR tablet Take 650 mg by mouth every 8 (eight) hours as needed for pain. Takes 2 tablets every morning   Yes PROVIDER, HISTORICAL   alendronate (FOSAMAX) 70 MG tablet Take 1 tablet (70 mg total) by mouth every 7 days. Take in the morning on an empty stomach with a full glass of water 30 minutes before food 4/23/19  Yes Hakan Monson MD   CALCIUM CARBONATE/VITAMIN D3 (CALCIUM 500 + D, D3, ORAL) Take by mouth 2 (two) times a day.   Yes PROVIDER, HISTORICAL   docusate sodium (COLACE) 100 MG capsule Take 100 mg by mouth 2 (two) times a day.   Yes PROVIDER, HISTORICAL   ferrous gluconate (FERGON) 324 MG  tablet Take 324 mg by mouth daily with breakfast.   Yes PROVIDER, HISTORICAL   multivitamin therapeutic (THERAGRAN) tablet Take 1 tablet by mouth daily.   Yes PROVIDER, HISTORICAL   omega-3 fatty acids (FISH OIL) 500 mg cap Take by mouth 2 (two) times a day.   Yes PROVIDER, HISTORICAL   omeprazole (PRILOSEC) 20 MG capsule Take 1 capsule (20 mg total) by mouth daily. 4/23/19  Yes Hakan Monson MD   oxybutynin (DITROPAN XL) 10 MG ER tablet Take 1 tablet (10 mg total) by mouth daily. 4/23/19  Yes Hakan Monson MD   potassium chloride (KLOR-CON 10) 10 MEQ CR tablet Take 1 tablet (10 mEq total) by mouth daily. 4/23/19  Yes Hakan Monson MD   sodium chloride (OCEAN) 0.65 % nasal spray 1 spray into each nostril as needed for congestion.   Yes PROVIDER, HISTORICAL   alendronate (FOSAMAX) 70 MG tablet Take 1 tablet (70 mg total) by mouth every 7 days. Take in the morning on an empty stomach with a full glass of water 30 minutes before food 10/23/18 4/23/19 Yes Hakan Monson MD   omeprazole (PRILOSEC) 20 MG capsule Take 1 capsule (20 mg total) by mouth daily. 10/23/18 4/23/19 Yes Hakan Monson MD   oxybutynin (DITROPAN XL) 10 MG ER tablet Take 1 tablet (10 mg total) by mouth daily. 10/23/18 4/23/19 Yes Hakan Monson MD   potassium chloride (KLOR-CON 10) 10 MEQ CR tablet Take 1 tablet (10 mEq total) by mouth daily. 10/23/18 4/23/19 Yes Hakan Monson MD        Past Medical History:   Past Medical History:   Diagnosis Date     Acute bronchiolitis      Arthritis      Essential hypertension     Created by Conversion  Replacement Utility updated for latest IMO load     GERD (gastroesophageal reflux disease)      Osteoporosis      Raynaud disease      Shingles        Past Surgical History: No past surgical history on file.    Social History:   Social History     Socioeconomic History     Marital status:      Spouse name: Not on file     Number of children: Not on file     Years of education: Not on file      Highest education level: Not on file   Occupational History     Not on file   Social Needs     Financial resource strain: Not on file     Food insecurity:     Worry: Not on file     Inability: Not on file     Transportation needs:     Medical: Not on file     Non-medical: Not on file   Tobacco Use     Smoking status: Former Smoker     Smokeless tobacco: Never Used   Substance and Sexual Activity     Alcohol use: No     Drug use: Not on file     Sexual activity: Not on file   Lifestyle     Physical activity:     Days per week: Not on file     Minutes per session: Not on file     Stress: Not on file   Relationships     Social connections:     Talks on phone: Not on file     Gets together: Not on file     Attends Bahai service: Not on file     Active member of club or organization: Not on file     Attends meetings of clubs or organizations: Not on file     Relationship status: Not on file     Intimate partner violence:     Fear of current or ex partner: Not on file     Emotionally abused: Not on file     Physically abused: Not on file     Forced sexual activity: Not on file   Other Topics Concern     Not on file   Social History Narrative    The patient lives by herself.        Family History: No family history on file.      Hakan Monson MD     fainted